# Patient Record
Sex: FEMALE | Race: WHITE | NOT HISPANIC OR LATINO | Employment: UNEMPLOYED | URBAN - METROPOLITAN AREA
[De-identification: names, ages, dates, MRNs, and addresses within clinical notes are randomized per-mention and may not be internally consistent; named-entity substitution may affect disease eponyms.]

---

## 2022-01-25 ENCOUNTER — HOSPITAL ENCOUNTER (INPATIENT)
Facility: HOSPITAL | Age: 57
LOS: 1 days | Discharge: HOME | End: 2022-01-27
Attending: EMERGENCY MEDICINE | Admitting: INTERNAL MEDICINE

## 2022-01-25 DIAGNOSIS — A41.9 SEPSIS, DUE TO UNSPECIFIED ORGANISM, UNSPECIFIED WHETHER ACUTE ORGAN DYSFUNCTION PRESENT: Primary | ICD-10-CM

## 2022-01-25 DIAGNOSIS — R10.9 ABDOMINAL PAIN, UNSPECIFIED ABDOMINAL LOCATION: ICD-10-CM

## 2022-01-25 DIAGNOSIS — R07.9 CHEST PAIN, UNSPECIFIED TYPE: ICD-10-CM

## 2022-01-25 LAB — GLUCOSE BLDC GLUCOMTR-MCNC: 432 MG/DL (ref 70–105)

## 2022-01-25 PROCEDURE — 99285 EMERGENCY DEPT VISIT HI MDM: CPT

## 2022-01-25 PROCEDURE — 82962 GLUCOSE BLOOD TEST: CPT

## 2022-01-25 PROCEDURE — 0202U NFCT DS 22 TRGT SARS-COV-2: CPT | Performed by: EMERGENCY MEDICINE

## 2022-01-26 ENCOUNTER — APPOINTMENT (OUTPATIENT)
Dept: GENERAL RADIOLOGY | Facility: HOSPITAL | Age: 57
End: 2022-01-26

## 2022-01-26 ENCOUNTER — APPOINTMENT (OUTPATIENT)
Dept: CT IMAGING | Facility: HOSPITAL | Age: 57
End: 2022-01-26

## 2022-01-26 PROBLEM — A41.9 SEPSIS: Status: ACTIVE | Noted: 2022-01-26

## 2022-01-26 PROBLEM — I95.9 HYPOTENSION: Status: ACTIVE | Noted: 2022-01-26

## 2022-01-26 PROBLEM — A41.9 SEVERE SEPSIS: Status: ACTIVE | Noted: 2022-01-26

## 2022-01-26 PROBLEM — R65.20 SEVERE SEPSIS: Status: ACTIVE | Noted: 2022-01-26

## 2022-01-26 PROBLEM — E87.6 HYPOKALEMIA: Status: ACTIVE | Noted: 2022-01-26

## 2022-01-26 PROBLEM — A41.9 SEPSIS: Status: RESOLVED | Noted: 2022-01-26 | Resolved: 2022-01-26

## 2022-01-26 PROBLEM — E87.20 LACTIC ACIDOSIS: Status: ACTIVE | Noted: 2022-01-26

## 2022-01-26 PROBLEM — L97.922 NON-PRESSURE ULCER OF LEFT LOWER EXTREMITY WITH FAT LAYER EXPOSED: Status: ACTIVE | Noted: 2022-01-26

## 2022-01-26 LAB
ALBUMIN SERPL-MCNC: 3.8 G/DL (ref 3.5–5.2)
ALBUMIN/GLOB SERPL: 1.5 G/DL
ALP SERPL-CCNC: 202 U/L (ref 39–117)
ALT SERPL W P-5'-P-CCNC: 32 U/L (ref 1–33)
ANION GAP SERPL CALCULATED.3IONS-SCNC: 15 MMOL/L (ref 5–15)
ANION GAP SERPL CALCULATED.3IONS-SCNC: 8 MMOL/L (ref 5–15)
APTT PPP: 23.1 SECONDS (ref 24–31)
AST SERPL-CCNC: 20 U/L (ref 1–32)
B PARAPERT DNA SPEC QL NAA+PROBE: NOT DETECTED
B PERT DNA SPEC QL NAA+PROBE: NOT DETECTED
BASOPHILS # BLD AUTO: 0.2 10*3/MM3 (ref 0–0.2)
BASOPHILS NFR BLD AUTO: 0.7 % (ref 0–1.5)
BILIRUB SERPL-MCNC: 0.6 MG/DL (ref 0–1.2)
BILIRUB UR QL STRIP: NEGATIVE
BUN SERPL-MCNC: 10 MG/DL (ref 6–20)
BUN SERPL-MCNC: 8 MG/DL (ref 6–20)
BUN/CREAT SERPL: 11.8 (ref 7–25)
BUN/CREAT SERPL: 25 (ref 7–25)
C PNEUM DNA NPH QL NAA+NON-PROBE: NOT DETECTED
CALCIUM SPEC-SCNC: 8.1 MG/DL (ref 8.6–10.5)
CALCIUM SPEC-SCNC: 9.6 MG/DL (ref 8.6–10.5)
CHLORIDE SERPL-SCNC: 100 MMOL/L (ref 98–107)
CHLORIDE SERPL-SCNC: 107 MMOL/L (ref 98–107)
CLARITY UR: CLEAR
CO2 SERPL-SCNC: 24 MMOL/L (ref 22–29)
CO2 SERPL-SCNC: 26 MMOL/L (ref 22–29)
COLOR UR: YELLOW
CREAT SERPL-MCNC: 0.4 MG/DL (ref 0.57–1)
CREAT SERPL-MCNC: 0.68 MG/DL (ref 0.57–1)
D-LACTATE SERPL-SCNC: 2.2 MMOL/L (ref 0.5–2)
D-LACTATE SERPL-SCNC: 2.6 MMOL/L (ref 0.5–2)
DEPRECATED RDW RBC AUTO: 40.7 FL (ref 37–54)
EOSINOPHIL # BLD AUTO: 0 10*3/MM3 (ref 0–0.4)
EOSINOPHIL NFR BLD AUTO: 0.1 % (ref 0.3–6.2)
ERYTHROCYTE [DISTWIDTH] IN BLOOD BY AUTOMATED COUNT: 14 % (ref 12.3–15.4)
FLUAV SUBTYP SPEC NAA+PROBE: NOT DETECTED
FLUBV RNA ISLT QL NAA+PROBE: NOT DETECTED
GFR SERPL CREATININE-BSD FRML MDRD: 90 ML/MIN/1.73
GFR SERPL CREATININE-BSD FRML MDRD: >150 ML/MIN/1.73
GLOBULIN UR ELPH-MCNC: 2.6 GM/DL
GLUCOSE BLDC GLUCOMTR-MCNC: 153 MG/DL (ref 70–105)
GLUCOSE BLDC GLUCOMTR-MCNC: 197 MG/DL (ref 70–105)
GLUCOSE BLDC GLUCOMTR-MCNC: 205 MG/DL (ref 70–105)
GLUCOSE BLDC GLUCOMTR-MCNC: 237 MG/DL (ref 70–105)
GLUCOSE BLDC GLUCOMTR-MCNC: 254 MG/DL (ref 70–105)
GLUCOSE SERPL-MCNC: 148 MG/DL (ref 65–99)
GLUCOSE SERPL-MCNC: 319 MG/DL (ref 65–99)
GLUCOSE UR STRIP-MCNC: ABNORMAL MG/DL
HADV DNA SPEC NAA+PROBE: NOT DETECTED
HBA1C MFR BLD: 13.5 % (ref 3.5–5.6)
HCOV 229E RNA SPEC QL NAA+PROBE: NOT DETECTED
HCOV HKU1 RNA SPEC QL NAA+PROBE: NOT DETECTED
HCOV NL63 RNA SPEC QL NAA+PROBE: NOT DETECTED
HCOV OC43 RNA SPEC QL NAA+PROBE: NOT DETECTED
HCT VFR BLD AUTO: 46.7 % (ref 34–46.6)
HGB BLD-MCNC: 15.4 G/DL (ref 12–15.9)
HGB UR QL STRIP.AUTO: NEGATIVE
HIV1+2 AB SER QL: NORMAL
HMPV RNA NPH QL NAA+NON-PROBE: NOT DETECTED
HOLD SPECIMEN: NORMAL
HPIV1 RNA ISLT QL NAA+PROBE: NOT DETECTED
HPIV2 RNA SPEC QL NAA+PROBE: NOT DETECTED
HPIV3 RNA NPH QL NAA+PROBE: NOT DETECTED
HPIV4 P GENE NPH QL NAA+PROBE: NOT DETECTED
INR PPP: 1.03 (ref 0.93–1.1)
KETONES UR QL STRIP: NEGATIVE
LEUKOCYTE ESTERASE UR QL STRIP.AUTO: NEGATIVE
LIPASE SERPL-CCNC: 17 U/L (ref 13–60)
LYMPHOCYTES # BLD AUTO: 0.9 10*3/MM3 (ref 0.7–3.1)
LYMPHOCYTES NFR BLD AUTO: 3.8 % (ref 19.6–45.3)
M PNEUMO IGG SER IA-ACNC: NOT DETECTED
MAGNESIUM SERPL-MCNC: 1.3 MG/DL (ref 1.6–2.6)
MCH RBC QN AUTO: 27.4 PG (ref 26.6–33)
MCHC RBC AUTO-ENTMCNC: 33 G/DL (ref 31.5–35.7)
MCV RBC AUTO: 82.9 FL (ref 79–97)
MONOCYTES # BLD AUTO: 0.6 10*3/MM3 (ref 0.1–0.9)
MONOCYTES NFR BLD AUTO: 2.7 % (ref 5–12)
NEUTROPHILS NFR BLD AUTO: 20.7 10*3/MM3 (ref 1.7–7)
NEUTROPHILS NFR BLD AUTO: 92.7 % (ref 42.7–76)
NITRITE UR QL STRIP: NEGATIVE
NRBC BLD AUTO-RTO: 0 /100 WBC (ref 0–0.2)
PH UR STRIP.AUTO: 5.5 [PH] (ref 5–8)
PLATELET # BLD AUTO: 176 10*3/MM3 (ref 140–450)
PMV BLD AUTO: 9.9 FL (ref 6–12)
POTASSIUM SERPL-SCNC: 3.2 MMOL/L (ref 3.5–5.2)
POTASSIUM SERPL-SCNC: 4.1 MMOL/L (ref 3.5–5.2)
PROT SERPL-MCNC: 6.4 G/DL (ref 6–8.5)
PROT UR QL STRIP: NEGATIVE
PROTHROMBIN TIME: 11.4 SECONDS (ref 9.6–11.7)
RBC # BLD AUTO: 5.63 10*6/MM3 (ref 3.77–5.28)
RHINOVIRUS RNA SPEC NAA+PROBE: NOT DETECTED
RSV RNA NPH QL NAA+NON-PROBE: NOT DETECTED
SARS-COV-2 RNA NPH QL NAA+NON-PROBE: NOT DETECTED
SARS-COV-2 RNA PNL SPEC NAA+PROBE: NOT DETECTED
SODIUM SERPL-SCNC: 139 MMOL/L (ref 136–145)
SODIUM SERPL-SCNC: 141 MMOL/L (ref 136–145)
SP GR UR STRIP: 1.04 (ref 1–1.03)
TROPONIN T SERPL-MCNC: <0.01 NG/ML (ref 0–0.03)
UROBILINOGEN UR QL STRIP: ABNORMAL
VANCOMYCIN PEAK SERPL-MCNC: 23.8 MCG/ML (ref 20–40)
WBC NRBC COR # BLD: 22.4 10*3/MM3 (ref 3.4–10.8)
WHOLE BLOOD HOLD SPECIMEN: NORMAL
WHOLE BLOOD HOLD SPECIMEN: NORMAL

## 2022-01-26 PROCEDURE — 85730 THROMBOPLASTIN TIME PARTIAL: CPT | Performed by: EMERGENCY MEDICINE

## 2022-01-26 PROCEDURE — 93005 ELECTROCARDIOGRAM TRACING: CPT | Performed by: EMERGENCY MEDICINE

## 2022-01-26 PROCEDURE — 25010000002 VANCOMYCIN PER 500 MG: Performed by: EMERGENCY MEDICINE

## 2022-01-26 PROCEDURE — 25010000002 VANCOMYCIN 10 G RECONSTITUTED SOLUTION: Performed by: INTERNAL MEDICINE

## 2022-01-26 PROCEDURE — 83605 ASSAY OF LACTIC ACID: CPT

## 2022-01-26 PROCEDURE — 63710000001 INSULIN LISPRO (HUMAN) PER 5 UNITS: Performed by: INTERNAL MEDICINE

## 2022-01-26 PROCEDURE — 25010000002 MAGNESIUM SULFATE 2 GM/50ML SOLUTION: Performed by: INTERNAL MEDICINE

## 2022-01-26 PROCEDURE — 87040 BLOOD CULTURE FOR BACTERIA: CPT | Performed by: EMERGENCY MEDICINE

## 2022-01-26 PROCEDURE — 25010000002 CEFEPIME PER 500 MG: Performed by: INTERNAL MEDICINE

## 2022-01-26 PROCEDURE — G0432 EIA HIV-1/HIV-2 SCREEN: HCPCS | Performed by: INTERNAL MEDICINE

## 2022-01-26 PROCEDURE — 83690 ASSAY OF LIPASE: CPT | Performed by: EMERGENCY MEDICINE

## 2022-01-26 PROCEDURE — 74177 CT ABD & PELVIS W/CONTRAST: CPT

## 2022-01-26 PROCEDURE — 84484 ASSAY OF TROPONIN QUANT: CPT | Performed by: EMERGENCY MEDICINE

## 2022-01-26 PROCEDURE — 87150 DNA/RNA AMPLIFIED PROBE: CPT | Performed by: EMERGENCY MEDICINE

## 2022-01-26 PROCEDURE — 80053 COMPREHEN METABOLIC PANEL: CPT | Performed by: INTERNAL MEDICINE

## 2022-01-26 PROCEDURE — 85610 PROTHROMBIN TIME: CPT | Performed by: EMERGENCY MEDICINE

## 2022-01-26 PROCEDURE — 83735 ASSAY OF MAGNESIUM: CPT | Performed by: INTERNAL MEDICINE

## 2022-01-26 PROCEDURE — 25010000002 VANCOMYCIN 10 G RECONSTITUTED SOLUTION: Performed by: EMERGENCY MEDICINE

## 2022-01-26 PROCEDURE — 63710000001 INSULIN GLARGINE PER 5 UNITS: Performed by: INTERNAL MEDICINE

## 2022-01-26 PROCEDURE — 71045 X-RAY EXAM CHEST 1 VIEW: CPT

## 2022-01-26 PROCEDURE — 82962 GLUCOSE BLOOD TEST: CPT

## 2022-01-26 PROCEDURE — 80202 ASSAY OF VANCOMYCIN: CPT | Performed by: INTERNAL MEDICINE

## 2022-01-26 PROCEDURE — 99231 SBSQ HOSP IP/OBS SF/LOW 25: CPT | Performed by: NURSE PRACTITIONER

## 2022-01-26 PROCEDURE — 36415 COLL VENOUS BLD VENIPUNCTURE: CPT | Performed by: INTERNAL MEDICINE

## 2022-01-26 PROCEDURE — 99222 1ST HOSP IP/OBS MODERATE 55: CPT | Performed by: PSYCHIATRY & NEUROLOGY

## 2022-01-26 PROCEDURE — 99223 1ST HOSP IP/OBS HIGH 75: CPT | Performed by: INTERNAL MEDICINE

## 2022-01-26 PROCEDURE — 0 IOPAMIDOL PER 1 ML: Performed by: EMERGENCY MEDICINE

## 2022-01-26 PROCEDURE — 25010000002 CEFEPIME PER 500 MG: Performed by: EMERGENCY MEDICINE

## 2022-01-26 PROCEDURE — 85025 COMPLETE CBC W/AUTO DIFF WBC: CPT | Performed by: EMERGENCY MEDICINE

## 2022-01-26 PROCEDURE — 81003 URINALYSIS AUTO W/O SCOPE: CPT | Performed by: EMERGENCY MEDICINE

## 2022-01-26 PROCEDURE — 25010000002 ONDANSETRON PER 1 MG: Performed by: EMERGENCY MEDICINE

## 2022-01-26 PROCEDURE — 83036 HEMOGLOBIN GLYCOSYLATED A1C: CPT | Performed by: INTERNAL MEDICINE

## 2022-01-26 RX ORDER — ARIPIPRAZOLE 2 MG/1
2 TABLET ORAL NIGHTLY
Status: DISCONTINUED | OUTPATIENT
Start: 2022-01-26 | End: 2022-01-27 | Stop reason: HOSPADM

## 2022-01-26 RX ORDER — INSULIN LISPRO 100 [IU]/ML
0-14 INJECTION, SOLUTION INTRAVENOUS; SUBCUTANEOUS
Status: DISCONTINUED | OUTPATIENT
Start: 2022-01-26 | End: 2022-01-27 | Stop reason: HOSPADM

## 2022-01-26 RX ORDER — CLOPIDOGREL BISULFATE 75 MG/1
75 TABLET ORAL DAILY
COMMUNITY

## 2022-01-26 RX ORDER — ACETAMINOPHEN 325 MG/1
650 TABLET ORAL EVERY 4 HOURS PRN
Status: DISCONTINUED | OUTPATIENT
Start: 2022-01-26 | End: 2022-01-27 | Stop reason: HOSPADM

## 2022-01-26 RX ORDER — ONDANSETRON 2 MG/ML
4 INJECTION INTRAMUSCULAR; INTRAVENOUS ONCE
Status: COMPLETED | OUTPATIENT
Start: 2022-01-26 | End: 2022-01-26

## 2022-01-26 RX ORDER — POTASSIUM CHLORIDE 1.5 G/1.77G
40 POWDER, FOR SOLUTION ORAL AS NEEDED
Status: DISCONTINUED | OUTPATIENT
Start: 2022-01-26 | End: 2022-01-27 | Stop reason: HOSPADM

## 2022-01-26 RX ORDER — ASPIRIN 81 MG/1
324 TABLET, CHEWABLE ORAL ONCE
Status: COMPLETED | OUTPATIENT
Start: 2022-01-26 | End: 2022-01-26

## 2022-01-26 RX ORDER — MIDODRINE HYDROCHLORIDE 5 MG/1
5 TABLET ORAL
Status: DISCONTINUED | OUTPATIENT
Start: 2022-01-26 | End: 2022-01-26

## 2022-01-26 RX ORDER — PAROXETINE HYDROCHLORIDE 40 MG/1
40 TABLET, FILM COATED ORAL DAILY
Status: DISCONTINUED | OUTPATIENT
Start: 2022-01-26 | End: 2022-01-27 | Stop reason: HOSPADM

## 2022-01-26 RX ORDER — MIDODRINE HYDROCHLORIDE 5 MG/1
5 TABLET ORAL ONCE
Status: COMPLETED | OUTPATIENT
Start: 2022-01-26 | End: 2022-01-26

## 2022-01-26 RX ORDER — ATORVASTATIN CALCIUM 40 MG/1
80 TABLET, FILM COATED ORAL NIGHTLY
Status: DISCONTINUED | OUTPATIENT
Start: 2022-01-26 | End: 2022-01-27 | Stop reason: HOSPADM

## 2022-01-26 RX ORDER — MAGNESIUM SULFATE HEPTAHYDRATE 40 MG/ML
2 INJECTION, SOLUTION INTRAVENOUS AS NEEDED
Status: DISCONTINUED | OUTPATIENT
Start: 2022-01-26 | End: 2022-01-27 | Stop reason: HOSPADM

## 2022-01-26 RX ORDER — POTASSIUM CHLORIDE 20 MEQ/1
40 TABLET, EXTENDED RELEASE ORAL ONCE
Status: COMPLETED | OUTPATIENT
Start: 2022-01-26 | End: 2022-01-26

## 2022-01-26 RX ORDER — NICOTINE POLACRILEX 4 MG
15 LOZENGE BUCCAL
Status: DISCONTINUED | OUTPATIENT
Start: 2022-01-26 | End: 2022-01-27 | Stop reason: HOSPADM

## 2022-01-26 RX ORDER — CLOPIDOGREL BISULFATE 75 MG/1
75 TABLET ORAL DAILY
Status: DISCONTINUED | OUTPATIENT
Start: 2022-01-26 | End: 2022-01-27 | Stop reason: HOSPADM

## 2022-01-26 RX ORDER — VANCOMYCIN 1.75 GRAM/500 ML IN 0.9 % SODIUM CHLORIDE INTRAVENOUS
20 ONCE
Status: COMPLETED | OUTPATIENT
Start: 2022-01-26 | End: 2022-01-26

## 2022-01-26 RX ORDER — MAGNESIUM SULFATE HEPTAHYDRATE 40 MG/ML
4 INJECTION, SOLUTION INTRAVENOUS AS NEEDED
Status: DISCONTINUED | OUTPATIENT
Start: 2022-01-26 | End: 2022-01-27 | Stop reason: HOSPADM

## 2022-01-26 RX ORDER — VANCOMYCIN 1.75 GRAM/500 ML IN 0.9 % SODIUM CHLORIDE INTRAVENOUS
20 ONCE
Status: DISCONTINUED | OUTPATIENT
Start: 2022-01-26 | End: 2022-01-26

## 2022-01-26 RX ORDER — POTASSIUM CHLORIDE 20 MEQ/1
40 TABLET, EXTENDED RELEASE ORAL AS NEEDED
Status: DISCONTINUED | OUTPATIENT
Start: 2022-01-26 | End: 2022-01-27 | Stop reason: HOSPADM

## 2022-01-26 RX ORDER — SODIUM CHLORIDE 0.9 % (FLUSH) 0.9 %
10 SYRINGE (ML) INJECTION EVERY 12 HOURS SCHEDULED
Status: DISCONTINUED | OUTPATIENT
Start: 2022-01-26 | End: 2022-01-27 | Stop reason: HOSPADM

## 2022-01-26 RX ORDER — SODIUM CHLORIDE 0.9 % (FLUSH) 0.9 %
10 SYRINGE (ML) INJECTION AS NEEDED
Status: DISCONTINUED | OUTPATIENT
Start: 2022-01-26 | End: 2022-01-27 | Stop reason: HOSPADM

## 2022-01-26 RX ORDER — PAROXETINE HYDROCHLORIDE 40 MG/1
40 TABLET, FILM COATED ORAL DAILY
COMMUNITY

## 2022-01-26 RX ORDER — FAMOTIDINE 10 MG/ML
20 INJECTION, SOLUTION INTRAVENOUS ONCE
Status: COMPLETED | OUTPATIENT
Start: 2022-01-26 | End: 2022-01-26

## 2022-01-26 RX ORDER — ACETAMINOPHEN 500 MG
1000 TABLET ORAL ONCE
Status: COMPLETED | OUTPATIENT
Start: 2022-01-26 | End: 2022-01-26

## 2022-01-26 RX ORDER — ATORVASTATIN CALCIUM 80 MG/1
80 TABLET, FILM COATED ORAL DAILY
COMMUNITY

## 2022-01-26 RX ORDER — ACETAMINOPHEN 500 MG
1000 TABLET ORAL EVERY 6 HOURS PRN
COMMUNITY

## 2022-01-26 RX ORDER — DEXTROSE MONOHYDRATE 25 G/50ML
25 INJECTION, SOLUTION INTRAVENOUS
Status: DISCONTINUED | OUTPATIENT
Start: 2022-01-26 | End: 2022-01-27 | Stop reason: HOSPADM

## 2022-01-26 RX ORDER — INSULIN LISPRO 100 [IU]/ML
0-14 INJECTION, SOLUTION INTRAVENOUS; SUBCUTANEOUS AS NEEDED
Status: DISCONTINUED | OUTPATIENT
Start: 2022-01-26 | End: 2022-01-27 | Stop reason: HOSPADM

## 2022-01-26 RX ORDER — OLANZAPINE 10 MG/2ML
1 INJECTION, POWDER, LYOPHILIZED, FOR SOLUTION INTRAMUSCULAR
Status: DISCONTINUED | OUTPATIENT
Start: 2022-01-26 | End: 2022-01-27 | Stop reason: HOSPADM

## 2022-01-26 RX ORDER — POTASSIUM CHLORIDE 7.45 MG/ML
10 INJECTION INTRAVENOUS
Status: DISCONTINUED | OUTPATIENT
Start: 2022-01-26 | End: 2022-01-27 | Stop reason: HOSPADM

## 2022-01-26 RX ORDER — MIDODRINE HYDROCHLORIDE 5 MG/1
10 TABLET ORAL
Status: DISCONTINUED | OUTPATIENT
Start: 2022-01-26 | End: 2022-01-27

## 2022-01-26 RX ORDER — SODIUM CHLORIDE, SODIUM LACTATE, POTASSIUM CHLORIDE, CALCIUM CHLORIDE 600; 310; 30; 20 MG/100ML; MG/100ML; MG/100ML; MG/100ML
100 INJECTION, SOLUTION INTRAVENOUS CONTINUOUS
Status: DISCONTINUED | OUTPATIENT
Start: 2022-01-26 | End: 2022-01-27 | Stop reason: HOSPADM

## 2022-01-26 RX ORDER — ONDANSETRON 2 MG/ML
4 INJECTION INTRAMUSCULAR; INTRAVENOUS EVERY 6 HOURS PRN
Status: DISCONTINUED | OUTPATIENT
Start: 2022-01-26 | End: 2022-01-27 | Stop reason: HOSPADM

## 2022-01-26 RX ORDER — INSULIN LISPRO 100 [IU]/ML
15 INJECTION, SOLUTION INTRAVENOUS; SUBCUTANEOUS
Status: DISCONTINUED | OUTPATIENT
Start: 2022-01-26 | End: 2022-01-27 | Stop reason: HOSPADM

## 2022-01-26 RX ORDER — INSULIN GLARGINE 100 [IU]/ML
20 INJECTION, SOLUTION SUBCUTANEOUS DAILY
COMMUNITY
End: 2023-03-02

## 2022-01-26 RX ADMIN — ASPIRIN 324 MG: 81 TABLET, CHEWABLE ORAL at 00:34

## 2022-01-26 RX ADMIN — POTASSIUM CHLORIDE 40 MEQ: 1500 TABLET, EXTENDED RELEASE ORAL at 03:35

## 2022-01-26 RX ADMIN — CLOPIDOGREL BISULFATE 75 MG: 75 TABLET, FILM COATED ORAL at 17:18

## 2022-01-26 RX ADMIN — VANCOMYCIN HYDROCHLORIDE 1250 MG: 10 INJECTION, POWDER, LYOPHILIZED, FOR SOLUTION INTRAVENOUS at 19:48

## 2022-01-26 RX ADMIN — MAGNESIUM SULFATE HEPTAHYDRATE 2 G: 2 INJECTION, SOLUTION INTRAVENOUS at 13:12

## 2022-01-26 RX ADMIN — SODIUM CHLORIDE 1000 ML: 9 INJECTION, SOLUTION INTRAVENOUS at 00:34

## 2022-01-26 RX ADMIN — MIDODRINE HYDROCHLORIDE 5 MG: 5 TABLET ORAL at 09:57

## 2022-01-26 RX ADMIN — MIDODRINE HYDROCHLORIDE 10 MG: 5 TABLET ORAL at 17:18

## 2022-01-26 RX ADMIN — PAROXETINE 40 MG: 40 TABLET, FILM COATED ORAL at 17:18

## 2022-01-26 RX ADMIN — IOPAMIDOL 100 ML: 755 INJECTION, SOLUTION INTRAVENOUS at 01:59

## 2022-01-26 RX ADMIN — MAGNESIUM SULFATE HEPTAHYDRATE 2 G: 2 INJECTION, SOLUTION INTRAVENOUS at 07:58

## 2022-01-26 RX ADMIN — ONDANSETRON 4 MG: 2 INJECTION INTRAMUSCULAR; INTRAVENOUS at 03:35

## 2022-01-26 RX ADMIN — INSULIN LISPRO 8 UNITS: 100 INJECTION, SOLUTION INTRAVENOUS; SUBCUTANEOUS at 10:01

## 2022-01-26 RX ADMIN — SODIUM CHLORIDE, POTASSIUM CHLORIDE, SODIUM LACTATE AND CALCIUM CHLORIDE 100 ML/HR: 600; 310; 30; 20 INJECTION, SOLUTION INTRAVENOUS at 07:52

## 2022-01-26 RX ADMIN — Medication 10 ML: at 09:12

## 2022-01-26 RX ADMIN — Medication 10 ML: at 20:24

## 2022-01-26 RX ADMIN — ONDANSETRON 4 MG: 2 INJECTION INTRAMUSCULAR; INTRAVENOUS at 00:47

## 2022-01-26 RX ADMIN — INSULIN GLARGINE 20 UNITS: 100 INJECTION, SOLUTION SUBCUTANEOUS at 20:23

## 2022-01-26 RX ADMIN — CEFEPIME HYDROCHLORIDE 2 G: 2 INJECTION, POWDER, FOR SOLUTION INTRAMUSCULAR; INTRAVENOUS at 21:17

## 2022-01-26 RX ADMIN — CEFEPIME HYDROCHLORIDE 2 G: 2 INJECTION, POWDER, FOR SOLUTION INTRAVENOUS at 03:35

## 2022-01-26 RX ADMIN — MAGNESIUM SULFATE HEPTAHYDRATE 2 G: 2 INJECTION, SOLUTION INTRAVENOUS at 10:05

## 2022-01-26 RX ADMIN — FAMOTIDINE 20 MG: 10 INJECTION INTRAVENOUS at 00:47

## 2022-01-26 RX ADMIN — MIDODRINE HYDROCHLORIDE 10 MG: 5 TABLET ORAL at 13:12

## 2022-01-26 RX ADMIN — SODIUM CHLORIDE 1000 ML: 9 INJECTION, SOLUTION INTRAVENOUS at 03:37

## 2022-01-26 RX ADMIN — MIDODRINE HYDROCHLORIDE 5 MG: 5 TABLET ORAL at 08:03

## 2022-01-26 RX ADMIN — APIXABAN 5 MG: 5 TABLET, FILM COATED ORAL at 20:25

## 2022-01-26 RX ADMIN — INSULIN LISPRO 15 UNITS: 100 INJECTION, SOLUTION INTRAVENOUS; SUBCUTANEOUS at 17:18

## 2022-01-26 RX ADMIN — VANCOMYCIN HYDROCHLORIDE 1750 MG: 10 INJECTION, POWDER, LYOPHILIZED, FOR SOLUTION INTRAVENOUS at 05:56

## 2022-01-26 RX ADMIN — SODIUM CHLORIDE 1000 ML: 9 INJECTION, SOLUTION INTRAVENOUS at 06:05

## 2022-01-26 RX ADMIN — ATORVASTATIN CALCIUM 80 MG: 40 TABLET, FILM COATED ORAL at 20:25

## 2022-01-26 RX ADMIN — ACETAMINOPHEN 1000 MG: 500 TABLET, FILM COATED ORAL at 00:34

## 2022-01-26 RX ADMIN — CEFEPIME HYDROCHLORIDE 2 G: 2 INJECTION, POWDER, FOR SOLUTION INTRAMUSCULAR; INTRAVENOUS at 13:11

## 2022-01-26 RX ADMIN — INSULIN LISPRO 5 UNITS: 100 INJECTION, SOLUTION INTRAVENOUS; SUBCUTANEOUS at 13:13

## 2022-01-26 RX ADMIN — ARIPIPRAZOLE 2 MG: 2 TABLET ORAL at 20:25

## 2022-01-26 RX ADMIN — MAGNESIUM SULFATE HEPTAHYDRATE 2 G: 2 INJECTION, SOLUTION INTRAVENOUS at 22:10

## 2022-01-27 VITALS
RESPIRATION RATE: 19 BRPM | SYSTOLIC BLOOD PRESSURE: 146 MMHG | TEMPERATURE: 98.6 F | DIASTOLIC BLOOD PRESSURE: 74 MMHG | BODY MASS INDEX: 31.08 KG/M2 | HEIGHT: 67 IN | OXYGEN SATURATION: 95 % | WEIGHT: 198 LBS | HEART RATE: 63 BPM

## 2022-01-27 PROBLEM — R10.9 ABDOMINAL PAIN: Status: RESOLVED | Noted: 2022-01-27 | Resolved: 2022-01-27

## 2022-01-27 PROBLEM — E87.6 HYPOKALEMIA: Status: RESOLVED | Noted: 2022-01-26 | Resolved: 2022-01-27

## 2022-01-27 PROBLEM — A41.9 SEVERE SEPSIS: Status: RESOLVED | Noted: 2022-01-26 | Resolved: 2022-01-27

## 2022-01-27 PROBLEM — R10.9 ABDOMINAL PAIN: Status: ACTIVE | Noted: 2022-01-27

## 2022-01-27 PROBLEM — I95.9 HYPOTENSION: Status: RESOLVED | Noted: 2022-01-26 | Resolved: 2022-01-27

## 2022-01-27 PROBLEM — E87.20 LACTIC ACIDOSIS: Status: RESOLVED | Noted: 2022-01-26 | Resolved: 2022-01-27

## 2022-01-27 PROBLEM — K52.9 GASTROENTERITIS PRESUMED INFECTIOUS: Status: ACTIVE | Noted: 2022-01-27

## 2022-01-27 PROBLEM — R65.20 SEVERE SEPSIS: Status: RESOLVED | Noted: 2022-01-26 | Resolved: 2022-01-27

## 2022-01-27 LAB
ALBUMIN SERPL-MCNC: 2.7 G/DL (ref 3.5–5.2)
ALBUMIN/GLOB SERPL: 1.3 G/DL
ALP SERPL-CCNC: 154 U/L (ref 39–117)
ALT SERPL W P-5'-P-CCNC: 33 U/L (ref 1–33)
ANION GAP SERPL CALCULATED.3IONS-SCNC: 5 MMOL/L (ref 5–15)
AST SERPL-CCNC: 36 U/L (ref 1–32)
BASOPHILS # BLD AUTO: 0.1 10*3/MM3 (ref 0–0.2)
BASOPHILS NFR BLD AUTO: 0.6 % (ref 0–1.5)
BILIRUB SERPL-MCNC: 0.3 MG/DL (ref 0–1.2)
BUN SERPL-MCNC: 9 MG/DL (ref 6–20)
BUN/CREAT SERPL: 22.5 (ref 7–25)
CALCIUM SPEC-SCNC: 7.8 MG/DL (ref 8.6–10.5)
CHLORIDE SERPL-SCNC: 107 MMOL/L (ref 98–107)
CO2 SERPL-SCNC: 25 MMOL/L (ref 22–29)
CREAT SERPL-MCNC: 0.4 MG/DL (ref 0.57–1)
D-LACTATE SERPL-SCNC: 0.9 MMOL/L (ref 0.5–2)
DEPRECATED RDW RBC AUTO: 41.1 FL (ref 37–54)
EOSINOPHIL # BLD AUTO: 0.1 10*3/MM3 (ref 0–0.4)
EOSINOPHIL NFR BLD AUTO: 1.1 % (ref 0.3–6.2)
ERYTHROCYTE [DISTWIDTH] IN BLOOD BY AUTOMATED COUNT: 14.1 % (ref 12.3–15.4)
GFR SERPL CREATININE-BSD FRML MDRD: >150 ML/MIN/1.73
GLOBULIN UR ELPH-MCNC: 2.1 GM/DL
GLUCOSE BLDC GLUCOMTR-MCNC: 152 MG/DL (ref 70–105)
GLUCOSE BLDC GLUCOMTR-MCNC: 229 MG/DL (ref 70–105)
GLUCOSE SERPL-MCNC: 168 MG/DL (ref 65–99)
HCT VFR BLD AUTO: 37.8 % (ref 34–46.6)
HGB BLD-MCNC: 12.4 G/DL (ref 12–15.9)
LYMPHOCYTES # BLD AUTO: 1.6 10*3/MM3 (ref 0.7–3.1)
LYMPHOCYTES NFR BLD AUTO: 16.2 % (ref 19.6–45.3)
MAGNESIUM SERPL-MCNC: 2.8 MG/DL (ref 1.6–2.6)
MCH RBC QN AUTO: 27.5 PG (ref 26.6–33)
MCHC RBC AUTO-ENTMCNC: 32.8 G/DL (ref 31.5–35.7)
MCV RBC AUTO: 83.7 FL (ref 79–97)
MONOCYTES # BLD AUTO: 0.7 10*3/MM3 (ref 0.1–0.9)
MONOCYTES NFR BLD AUTO: 7.4 % (ref 5–12)
NEUTROPHILS NFR BLD AUTO: 7.3 10*3/MM3 (ref 1.7–7)
NEUTROPHILS NFR BLD AUTO: 74.7 % (ref 42.7–76)
NRBC BLD AUTO-RTO: 0 /100 WBC (ref 0–0.2)
PLATELET # BLD AUTO: 128 10*3/MM3 (ref 140–450)
PMV BLD AUTO: 9.9 FL (ref 6–12)
POTASSIUM SERPL-SCNC: 4.1 MMOL/L (ref 3.5–5.2)
PROT SERPL-MCNC: 4.8 G/DL (ref 6–8.5)
QT INTERVAL: 326 MS
RBC # BLD AUTO: 4.51 10*6/MM3 (ref 3.77–5.28)
SODIUM SERPL-SCNC: 137 MMOL/L (ref 136–145)
VANCOMYCIN TROUGH SERPL-MCNC: 10.6 MCG/ML (ref 5–20)
WBC NRBC COR # BLD: 9.8 10*3/MM3 (ref 3.4–10.8)

## 2022-01-27 PROCEDURE — 63710000001 INSULIN LISPRO (HUMAN) PER 5 UNITS: Performed by: INTERNAL MEDICINE

## 2022-01-27 PROCEDURE — 25010000002 VANCOMYCIN 10 G RECONSTITUTED SOLUTION: Performed by: INTERNAL MEDICINE

## 2022-01-27 PROCEDURE — 80053 COMPREHEN METABOLIC PANEL: CPT | Performed by: INTERNAL MEDICINE

## 2022-01-27 PROCEDURE — 25010000002 MAGNESIUM SULFATE 2 GM/50ML SOLUTION: Performed by: INTERNAL MEDICINE

## 2022-01-27 PROCEDURE — 83735 ASSAY OF MAGNESIUM: CPT | Performed by: INTERNAL MEDICINE

## 2022-01-27 PROCEDURE — 83605 ASSAY OF LACTIC ACID: CPT | Performed by: INTERNAL MEDICINE

## 2022-01-27 PROCEDURE — 85025 COMPLETE CBC W/AUTO DIFF WBC: CPT | Performed by: INTERNAL MEDICINE

## 2022-01-27 PROCEDURE — 25010000002 CEFEPIME PER 500 MG: Performed by: INTERNAL MEDICINE

## 2022-01-27 PROCEDURE — 36415 COLL VENOUS BLD VENIPUNCTURE: CPT | Performed by: INTERNAL MEDICINE

## 2022-01-27 PROCEDURE — 97165 OT EVAL LOW COMPLEX 30 MIN: CPT

## 2022-01-27 PROCEDURE — 82962 GLUCOSE BLOOD TEST: CPT

## 2022-01-27 PROCEDURE — 99239 HOSP IP/OBS DSCHRG MGMT >30: CPT | Performed by: INTERNAL MEDICINE

## 2022-01-27 PROCEDURE — 80202 ASSAY OF VANCOMYCIN: CPT | Performed by: INTERNAL MEDICINE

## 2022-01-27 PROCEDURE — 99231 SBSQ HOSP IP/OBS SF/LOW 25: CPT | Performed by: PSYCHIATRY & NEUROLOGY

## 2022-01-27 PROCEDURE — 97161 PT EVAL LOW COMPLEX 20 MIN: CPT

## 2022-01-27 RX ORDER — ARIPIPRAZOLE 2 MG/1
2 TABLET ORAL NIGHTLY
Qty: 30 TABLET | Refills: 0 | Status: SHIPPED | OUTPATIENT
Start: 2022-01-27 | End: 2023-03-02

## 2022-01-27 RX ORDER — ONDANSETRON 4 MG/1
4 TABLET, ORALLY DISINTEGRATING ORAL EVERY 8 HOURS PRN
Qty: 30 TABLET | Refills: 0 | Status: SHIPPED | OUTPATIENT
Start: 2022-01-27 | End: 2023-03-02

## 2022-01-27 RX ADMIN — INSULIN LISPRO 15 UNITS: 100 INJECTION, SOLUTION INTRAVENOUS; SUBCUTANEOUS at 10:34

## 2022-01-27 RX ADMIN — MUPIROCIN 1 APPLICATION: 20 OINTMENT TOPICAL at 10:35

## 2022-01-27 RX ADMIN — PAROXETINE 40 MG: 40 TABLET, FILM COATED ORAL at 10:34

## 2022-01-27 RX ADMIN — MAGNESIUM SULFATE HEPTAHYDRATE 2 G: 2 INJECTION, SOLUTION INTRAVENOUS at 00:42

## 2022-01-27 RX ADMIN — CLOPIDOGREL BISULFATE 75 MG: 75 TABLET, FILM COATED ORAL at 10:34

## 2022-01-27 RX ADMIN — INSULIN LISPRO 5 UNITS: 100 INJECTION, SOLUTION INTRAVENOUS; SUBCUTANEOUS at 14:12

## 2022-01-27 RX ADMIN — MAGNESIUM SULFATE HEPTAHYDRATE 2 G: 2 INJECTION, SOLUTION INTRAVENOUS at 03:51

## 2022-01-27 RX ADMIN — INSULIN LISPRO 15 UNITS: 100 INJECTION, SOLUTION INTRAVENOUS; SUBCUTANEOUS at 14:12

## 2022-01-27 RX ADMIN — VANCOMYCIN HYDROCHLORIDE 1250 MG: 10 INJECTION, POWDER, LYOPHILIZED, FOR SOLUTION INTRAVENOUS at 05:59

## 2022-01-27 RX ADMIN — MIDODRINE HYDROCHLORIDE 10 MG: 5 TABLET ORAL at 10:34

## 2022-01-27 RX ADMIN — APIXABAN 5 MG: 5 TABLET, FILM COATED ORAL at 10:34

## 2022-01-27 RX ADMIN — CEFEPIME HYDROCHLORIDE 2 G: 2 INJECTION, POWDER, FOR SOLUTION INTRAMUSCULAR; INTRAVENOUS at 02:53

## 2022-01-28 LAB
BACTERIA BLD CULT: NORMAL
BOTTLE TYPE: NORMAL

## 2022-01-31 LAB
BACTERIA SPEC AEROBE CULT: ABNORMAL
BACTERIA SPEC AEROBE CULT: NORMAL
GRAM STN SPEC: ABNORMAL

## 2023-03-02 ENCOUNTER — HOSPITAL ENCOUNTER (OUTPATIENT)
Dept: GENERAL RADIOLOGY | Facility: HOSPITAL | Age: 58
Discharge: HOME | End: 2023-03-02
Payer: MEDICAID

## 2023-03-02 ENCOUNTER — PATIENT ROUNDING (BHMG ONLY) (OUTPATIENT)
Dept: FAMILY MEDICINE CLINIC | Facility: CLINIC | Age: 58
End: 2023-03-02
Payer: MEDICAID

## 2023-03-02 ENCOUNTER — OFFICE VISIT (OUTPATIENT)
Dept: FAMILY MEDICINE CLINIC | Facility: CLINIC | Age: 58
End: 2023-03-02
Payer: MEDICAID

## 2023-03-02 VITALS
SYSTOLIC BLOOD PRESSURE: 90 MMHG | DIASTOLIC BLOOD PRESSURE: 63 MMHG | HEART RATE: 88 BPM | TEMPERATURE: 97.4 F | HEIGHT: 67 IN | WEIGHT: 167 LBS | OXYGEN SATURATION: 94 % | BODY MASS INDEX: 26.21 KG/M2 | RESPIRATION RATE: 16 BRPM

## 2023-03-02 DIAGNOSIS — Z28.21 INFLUENZA VACCINATION DECLINED: ICD-10-CM

## 2023-03-02 DIAGNOSIS — E78.5 DYSLIPIDEMIA: ICD-10-CM

## 2023-03-02 DIAGNOSIS — E66.3 OVERWEIGHT (BMI 25.0-29.9): ICD-10-CM

## 2023-03-02 DIAGNOSIS — Z00.00 WELLNESS EXAMINATION: Primary | ICD-10-CM

## 2023-03-02 DIAGNOSIS — I25.10 CORONARY ARTERY DISEASE INVOLVING NATIVE CORONARY ARTERY OF NATIVE HEART WITHOUT ANGINA PECTORIS: ICD-10-CM

## 2023-03-02 DIAGNOSIS — I48.92 ATRIAL FLUTTER, PAROXYSMAL: ICD-10-CM

## 2023-03-02 DIAGNOSIS — G89.29 CHRONIC LEFT SHOULDER PAIN: ICD-10-CM

## 2023-03-02 DIAGNOSIS — E11.40 TYPE 2 DIABETES MELLITUS WITH DIABETIC NEUROPATHY, WITHOUT LONG-TERM CURRENT USE OF INSULIN: ICD-10-CM

## 2023-03-02 DIAGNOSIS — M25.512 CHRONIC LEFT SHOULDER PAIN: ICD-10-CM

## 2023-03-02 DIAGNOSIS — Z11.59 NEED FOR HEPATITIS C SCREENING TEST: ICD-10-CM

## 2023-03-02 DIAGNOSIS — R15.9 INCONTINENCE OF FECES, UNSPECIFIED FECAL INCONTINENCE TYPE: ICD-10-CM

## 2023-03-02 DIAGNOSIS — Z72.0 TOBACCO USER: ICD-10-CM

## 2023-03-02 DIAGNOSIS — Z23 NEED FOR TDAP VACCINATION: ICD-10-CM

## 2023-03-02 DIAGNOSIS — R05.3 CHRONIC COUGH: ICD-10-CM

## 2023-03-02 DIAGNOSIS — I73.9 PVD (PERIPHERAL VASCULAR DISEASE): ICD-10-CM

## 2023-03-02 DIAGNOSIS — M54.2 NECK PAIN: ICD-10-CM

## 2023-03-02 PROBLEM — I10 ESSENTIAL HYPERTENSION: Status: ACTIVE | Noted: 2021-06-09

## 2023-03-02 PROBLEM — E11.9 CONTROLLED TYPE 2 DIABETES MELLITUS WITHOUT COMPLICATION, WITH LONG-TERM CURRENT USE OF INSULIN: Status: ACTIVE | Noted: 2021-06-14

## 2023-03-02 PROBLEM — Z98.61 S/P PTCA (PERCUTANEOUS TRANSLUMINAL CORONARY ANGIOPLASTY): Status: ACTIVE | Noted: 2021-06-14

## 2023-03-02 PROBLEM — Z79.4 CONTROLLED TYPE 2 DIABETES MELLITUS WITHOUT COMPLICATION, WITH LONG-TERM CURRENT USE OF INSULIN: Status: ACTIVE | Noted: 2021-06-14

## 2023-03-02 PROBLEM — I21.11 ST ELEVATION MYOCARDIAL INFARCTION INVOLVING RIGHT CORONARY ARTERY: Status: ACTIVE | Noted: 2021-06-09

## 2023-03-02 LAB
BILIRUB BLD-MCNC: NEGATIVE MG/DL
CLARITY, POC: CLEAR
COLOR UR: YELLOW
EXPIRATION DATE: NORMAL
GLUCOSE UR STRIP-MCNC: NEGATIVE MG/DL
KETONES UR QL: NEGATIVE
LEUKOCYTE EST, POC: NEGATIVE
Lab: NORMAL
NITRITE UR-MCNC: NEGATIVE MG/ML
PH UR: 6 [PH] (ref 5–8)
PROT UR STRIP-MCNC: NEGATIVE MG/DL
RBC # UR STRIP: NEGATIVE /UL
SP GR UR: 1.01 (ref 1–1.03)
UROBILINOGEN UR QL: NORMAL

## 2023-03-02 PROCEDURE — 99214 OFFICE O/P EST MOD 30 MIN: CPT | Performed by: NURSE PRACTITIONER

## 2023-03-02 PROCEDURE — 90715 TDAP VACCINE 7 YRS/> IM: CPT | Performed by: NURSE PRACTITIONER

## 2023-03-02 PROCEDURE — 90471 IMMUNIZATION ADMIN: CPT | Performed by: NURSE PRACTITIONER

## 2023-03-02 PROCEDURE — 2014F MENTAL STATUS ASSESS: CPT | Performed by: NURSE PRACTITIONER

## 2023-03-02 PROCEDURE — 99396 PREV VISIT EST AGE 40-64: CPT | Performed by: NURSE PRACTITIONER

## 2023-03-02 PROCEDURE — 3008F BODY MASS INDEX DOCD: CPT | Performed by: NURSE PRACTITIONER

## 2023-03-02 PROCEDURE — 72050 X-RAY EXAM NECK SPINE 4/5VWS: CPT

## 2023-03-02 PROCEDURE — 71046 X-RAY EXAM CHEST 2 VIEWS: CPT

## 2023-03-02 PROCEDURE — 73030 X-RAY EXAM OF SHOULDER: CPT

## 2023-03-02 RX ORDER — CLOPIDOGREL BISULFATE 75 MG/1
1 TABLET ORAL DAILY
COMMUNITY
Start: 2023-01-11 | End: 2023-03-02

## 2023-03-02 RX ORDER — ATORVASTATIN CALCIUM 80 MG/1
1 TABLET, FILM COATED ORAL DAILY
COMMUNITY
Start: 2023-01-11 | End: 2023-03-02

## 2023-03-02 NOTE — PROGRESS NOTES
March 2, 2023    Hello, may I speak with Yana Araya?    My name is claude      I am  with BALJEET NEWMAN [address]  [facility]  [address]  [place] [address] [phone].    Before we get started may I verify your date of birth? 1965    I am calling to officially welcome you to our practice and ask about your recent visit. Is this a good time to talk? yes    Tell me about your visit with us. What things went well?  nice went over everything       We're always looking for ways to make our patients' experiences even better. Do you have recommendations on ways we may improve?  no    Overall were you satisfied with your first visit to our practice? yes       I appreciate you taking the time to speak with me today. Is there anything else I can do for you? no      Thank you, and have a great day.

## 2023-03-02 NOTE — PROGRESS NOTES
March 2, 2023    Hello, may I speak with Yana Araya?    My name is claude      I am  with BALJEET NEWMAN [address]  [facility]  [address]  [place] [address] [phone].    Before we get started may I verify your date of birth? 1965    I am calling to officially welcome you to our practice and ask about your recent visit. Is this a good time to talk? yes    Tell me about your visit with us. What things went well?  she is nice and went over everything       We're always looking for ways to make our patients' experiences even better. Do you have recommendations on ways we may improve?  no    Overall were you satisfied with your first visit to our practice? yes       I appreciate you taking the time to speak with me today. Is there anything else I can do for you? no      Thank you, and have a great day.

## 2023-03-02 NOTE — PROGRESS NOTES
Chief Complaint  Annual Exam, Diabetes, Hyperlipidemia, and Shoulder Pain    Subjective          Yana is a 57 y.o. female  who presents to [facility]     Patient Care Team:  Melania Damico APRN as PCP - General (Family Medicine)  Darian Babcock MD as Cardiologist (Interventional Cardiology)     History of Present Illness  Yana is here today to establish care.  New patient to the office.  Previous PCP Mer Aleman in [place], [place].  Last visit about 1 year ago    Adult Annual Wellness    Social History  Tobacco use -  1 PPD  Alcohol use -  none  Drug use - none    General health habits  Last eye exam - over 1 year ago  Last dental exam - over 1 year ago, needs some teeth pulled; wears upper dentures    Diet - Regular diet    Weight / BMI - overweight, BMI 26.1  She used to weigh > 300 lbs    Exercise - walks sometimes    Reproductive health -  Sexually active - yes sometimes  Birth control - hysterectomy    Screening/prevention  Last mammogram - [facility] in the last year  Last pap smear/ cervical cancer screening: over 3 years ago  Colon cancer screening - maybe 8 years ago at [facility]  Immunizations - unsure    Patient presents for follow-up of diabetes  This is an established problem  Interim treatment changes: none  Current medications: Jardiance 25 mg daily  She does not check her sugar at home.  Unsure last [id number]    Patient presents for follow-up of hyperlipidemia  This is an established problem  Interim treatment changes: none  Current medications: atorvastatin 80 mg daily  Last lipid panel unsure date    Patient presents for follow-up of anxiety and depression  This is an established problem  Interim treatment changes: none  Current medications: paroxetine 40 mg daily      Location: Left shoulder pain  Quality: aching  Severity: moderate to severe  Duration: for several years  Timing: intermittent  Context: No injury that she can  remember.  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  Alleviating factors: massage rubs, Tylenol  Aggravating factors: stress, when it rains  Associated Symptoms: + difficulty moving her shoulder at times  She had an xray of left shoulder maybe 10 years ago        Yana Araya  has a past medical history of Anxiety, CAD (coronary artery disease), Depression, Diabetes ([facility]), Dyslipidemia, Fibromyalgia, History of inferior wall myocardial infarction, MI (myocardial infarction) ([facility]), Orthostatic hypotension, Paroxysmal atrial flutter ([facility]), Peripheral vascular disease ([facility]), and Sleep apnea.      Review of Systems   Constitutional: Positive for fatigue. Negative for fever.   HENT: Negative for ear pain, sore throat and trouble swallowing.    Eyes: Negative for visual disturbance.   Respiratory: Positive for cough. Negative for shortness of breath.    Cardiovascular: Negative for chest pain, palpitations and leg swelling.   Gastrointestinal: Negative for abdominal pain, blood in stool, constipation, diarrhea, nausea and vomiting.        + fecal incontinence   Genitourinary: Negative for dysuria, frequency and hematuria.   Musculoskeletal: Positive for neck pain.   Skin: Negative for rash.   Allergic/Immunologic: Positive for environmental allergies.   Neurological: Positive for numbness. Negative for dizziness.   Psychiatric/Behavioral: Positive for dysphoric mood. Negative for sleep disturbance.        Family History   Problem Relation Age of Onset   • Diabetes Mother    • No Known Problems Father         Past Surgical History:   Procedure Laterality Date   • COLONOSCOPY      [facility]   • CORONARY STENT PLACEMENT  06/09/2021   • FOOT SURGERY Left     removed a bone due to an ulcer; Dr. Velasco   • HYSTERECTOMY  04/23/2002    Dr. Casillas; due to abnormal cervical cells and heavy bleeding   • SALIVARY GLAND EXCISION     • TONSILLECTOMY          Social History  "    Socioeconomic History   • Marital status:    • Number of children: 2   • Highest education level: 11th grade   Tobacco Use   • Smoking status: Every Day     Packs/day: 1.00     Years: 39.00     Pack years: 39.00     Types: Cigarettes     Start date: 1984   • Smokeless tobacco: Never   Vaping Use   • Vaping Use: Never used   Substance and Sexual Activity   • Alcohol use: Never   • Drug use: Never   • Sexual activity: Yes     Partners: Male     Birth control/protection: Hysterectomy        Objective       Current Outpatient Medications:   •  acetaminophen (TYLENOL) 500 MG tablet, Take 2 tablets by mouth Every 6 (Six) Hours As Needed for Mild Pain., Disp: , Rfl:   •  apixaban (ELIQUIS) 5 MG tablet tablet, Take 1 tablet by mouth Every 12 (Twelve) Hours., Disp: , Rfl:   •  atorvastatin (LIPITOR) 80 MG tablet, Take 1 tablet by mouth Daily., Disp: , Rfl:   •  clopidogrel (PLAVIX) 75 MG tablet, Take 1 tablet by mouth Daily., Disp: , Rfl:   •  empagliflozin (JARDIANCE) 25 MG tablet tablet, Take 1 tablet by mouth Daily., Disp: , Rfl:   •  PARoxetine (PAXIL) 40 MG tablet, Take 1 tablet by mouth Daily., Disp: , Rfl:     Vital Signs:      BP 90/63 (BP Location: Right arm, Patient Position: Sitting, Cuff Size: Adult)   Pulse 88   Temp 97.4 °F (36.3 °C) (Infrared)   Resp 16   Ht 170.2 cm (67\")   Wt 75.8 kg (167 lb)   SpO2 94%   BMI 26.16 kg/m²     Vitals:    03/02/23 0851   BP: 90/63   BP Location: Right arm   Patient Position: Sitting   Cuff Size: Adult   Pulse: 88   Resp: 16   Temp: 97.4 °F (36.3 °C)   TempSrc: Infrared   SpO2: 94%   Weight: 75.8 kg (167 lb)   Height: 170.2 cm (67\")      Physical Exam  Vitals reviewed.   Constitutional:       General: She is not in acute distress.     Appearance: Normal appearance.   HENT:      Head: Normocephalic and atraumatic.      Right Ear: Tympanic membrane, ear canal and external ear normal.      Left Ear: Tympanic membrane, ear canal and external ear normal.      " Mouth/Throat:      Mouth: Mucous membranes are moist.      Pharynx: Oropharynx is clear.   Eyes:      General: No scleral icterus.     Conjunctiva/sclera: Conjunctivae normal.   Neck:      Thyroid: No thyromegaly.   Cardiovascular:      Rate and Rhythm: Normal rate and regular rhythm.      Heart sounds: Normal heart sounds.   Pulmonary:      Effort: Pulmonary effort is normal. No respiratory distress.      Breath sounds: Normal breath sounds. No wheezing.   Abdominal:      General: Bowel sounds are normal. There is no distension.      Palpations: Abdomen is soft. There is no mass.      Tenderness: There is no abdominal tenderness. There is no right CVA tenderness, left CVA tenderness, guarding or rebound.   Musculoskeletal:      Left shoulder: Normal.      Cervical back: Neck supple. Normal range of motion.      Right lower leg: No edema.      Left lower leg: No edema.   Lymphadenopathy:      Cervical: No cervical adenopathy.   Skin:     General: Skin is warm and dry.   Neurological:      Mental Status: She is alert and oriented to person, place, and time.   Psychiatric:         Mood and Affect: Mood normal.        Result Review :                Office Visit on 03/02/2023   Component Date Value Ref Range Status   • Creatinine, Urine 03/02/2023 34.6  Not Estab. mg/dL Final   • Microalbumin, Urine 03/02/2023 4.6  Not Estab. ug/mL Final   • Microalbumin/Creatinine Ratio 03/02/2023 13  0 - 29 mg/g creat Final    Comment:                        Normal:                0 -  29                         Moderately increased: 30 - 300                         Severely increased:       >300     • Color 03/02/2023 Yellow  Yellow, Straw, Dark Yellow, Monica Final   • Clarity, UA 03/02/2023 Clear  Clear Final   • Specific Gravity  03/02/2023 1.015  1.005 - 1.030 Final   • pH, Urine 03/02/2023 6.0  5.0 - 8.0 Final   • Leukocytes 03/02/2023 Negative  Negative Final   • Nitrite, UA 03/02/2023 Negative  Negative Final   • Protein, POC  03/02/2023 Negative  Negative mg/dL Final   • Glucose, UA 03/02/2023 Negative  Negative mg/dL Final   • Ketones, UA 03/02/2023 Negative  Negative Final   • Urobilinogen, UA 03/02/2023 Normal  Normal, 0.2 E.U./dL Final   • Bilirubin 03/02/2023 Negative  Negative Final   • Blood, UA 03/02/2023 Negative  Negative Final   • Lot Number 03/02/2023 na   Final   • Expiration Date 03/02/2023 na   Final         PHQ-9 Depression Screening  Little interest or pleasure in doing things? 1-->several days   Feeling down, depressed, or hopeless? 1-->several days   Trouble falling or staying asleep, or sleeping too much? 3-->nearly every day   Feeling tired or having little energy? 3-->nearly every day   Poor appetite or overeating?     Feeling bad about yourself - or that you are a failure or have let yourself or your family down? 0-->not at all   Trouble concentrating on things, such as reading the newspaper or watching television? 0-->not at all   Moving or speaking so slowly that other people could have noticed? Or the opposite - being so fidgety or restless that you have been moving around a lot more than usual? 0-->not at all   Thoughts that you would be better off dead, or of hurting yourself in some way? 0-->not at all   PHQ-9 Total Score 8   If you checked off any problems, how difficult have these problems made it for you to do your work, take care of things at home, or get along with other people? somewhat difficult            Assessment and Plan    Diagnoses and all orders for this visit:    1. Wellness examination (Primary)  Assessment & Plan:  Discussed preventative health care.  Labs ordered.  Will request a copy of her colonoscopy and mammogram done at [facility].  Tdap given.  Recommended annual eye and dental exams.    Orders:  -     Lipid Panel  -     Comprehensive Metabolic Panel  -     CBC & Differential  -     TSH Rfx On Abnormal To Free T4    2. Type 2 diabetes mellitus with diabetic neuropathy,  without long-term current use of insulin ([facility])  Assessment & Plan:  Continue Jardiance 25 mg daily.  Check HgbA1c and urine microalbumin creatinine ratio.    Orders:  -     Comprehensive Metabolic Panel  -     Hemoglobin A1c  -     Microalbumin / Creatinine Urine Ratio - Urine, Clean Catch  -     POC Urinalysis Dipstick, Automated    3. Dyslipidemia  Assessment & Plan:  Continue atorvastatin 80 mg daily.  Check fasting lipid panel.    Orders:  -     Lipid Panel    4. PVD (peripheral vascular disease) ([facility])  Overview:  Per cardiology note - She had a CTA with run off. This documents CTA with borderline multifocal stenosis in the L common iliac artery, R > L with diffuse SFA disease, three vessel run off bilaterally, severe nolvia superficial varicosites the majority appeared to be arising from the bilateral greater saphenous veins, the right common femoral artery may represent dissection flap vs. Residual chronic thrombus. Dr. Menard spoke with Dr. Babcock. She is suppose to have surgery but Medicaid will not approve.          5. Coronary artery disease involving native coronary artery of native heart without angina pectoris  Assessment & Plan:  Follow up with cardiology as scheduled.      6. Atrial flutter, paroxysmal ([facility])  Overview:  Per cardiology note - occurred post MI. Arrhythmia has not been documented in recurrence. She remains on oral anticoagulation due to elevated cardiac embolic risk score.        7. Chronic left shoulder pain  Assessment & Plan:  Ordered shoulder x-ray.    Orders:  -     XR Shoulder 2+ View Left    8. Neck pain  Comments:  Ordered cervical spine x-ray  Orders:  -     XR Spine Cervical Complete 4 or 5 View    9. Incontinence of feces, unspecified fecal incontinence type  Comments:  Refer to gastroenterology  Orders:  -     Ambulatory Referral to Gastroenterology    10. Chronic cough  Comments:  Ordered chest x-ray  Orders:  -     XR Chest PA & Lateral    11. Tobacco user  Assessment &  Plan:  Yana Araya  reports that she has been smoking cigarettes. She started smoking about 39 years ago. She has a 39.00 pack-year smoking history. She has never used smokeless tobacco.. I have educated her on the risk of diseases from using tobacco products such as cancer, COPD and heart disease.     I advised her to quit and she is not ready to quit at this time.  Discussed smoking cessation and that it is one of the best things you can do for your health.  I spent 3  minutes counseling the patient.           12. Need for hepatitis C screening test  -     Hepatitis C Antibody    13. Need for Tdap vaccination  -     Tdap Vaccine Greater Than or Equal To 6yo IM    14. Influenza vaccination declined    15. Overweight (BMI 25.0-29.9)  Assessment & Plan:  Patient's (Body mass index is 26.16 kg/m².) indicates that they are overweight with health conditions that include coronary heart disease, diabetes mellitus and dyslipidemias . Weight is newly identified. BMI  is above average; BMI management plan is completed. We discussed portion control and increasing exercise.              Follow Up   Return in about 2 weeks (around 3/16/2023) for Recheck.  Patient was given instructions and counseling regarding her condition or for health maintenance advice. Please see specific information pulled into the AVS if appropriate.    There are no Patient Instructions on file for this visit.

## 2023-03-03 ENCOUNTER — TELEPHONE (OUTPATIENT)
Dept: FAMILY MEDICINE CLINIC | Facility: CLINIC | Age: 58
End: 2023-03-03
Payer: MEDICAID

## 2023-03-03 DIAGNOSIS — M54.2 NECK PAIN: ICD-10-CM

## 2023-03-03 DIAGNOSIS — M25.512 CHRONIC LEFT SHOULDER PAIN: Primary | ICD-10-CM

## 2023-03-03 DIAGNOSIS — G89.29 CHRONIC LEFT SHOULDER PAIN: Primary | ICD-10-CM

## 2023-03-03 PROBLEM — Z00.00 WELLNESS EXAMINATION: Status: ACTIVE | Noted: 2023-03-03

## 2023-03-03 PROBLEM — I73.9 PVD (PERIPHERAL VASCULAR DISEASE): Status: ACTIVE | Noted: 2023-03-03

## 2023-03-03 PROBLEM — I48.0 PAROXYSMAL ATRIAL FIBRILLATION: Status: RESOLVED | Noted: 2023-03-03 | Resolved: 2023-03-03

## 2023-03-03 PROBLEM — E66.3 OVERWEIGHT (BMI 25.0-29.9): Status: ACTIVE | Noted: 2023-03-03

## 2023-03-03 PROBLEM — I48.0 PAROXYSMAL ATRIAL FIBRILLATION: Status: ACTIVE | Noted: 2023-03-03

## 2023-03-03 PROBLEM — I25.10 CORONARY ARTERY DISEASE INVOLVING NATIVE CORONARY ARTERY OF NATIVE HEART WITHOUT ANGINA PECTORIS: Status: ACTIVE | Noted: 2023-03-03

## 2023-03-03 LAB
ALBUMIN/CREAT UR: 13 MG/G CREAT (ref 0–29)
CREAT UR-MCNC: 34.6 MG/DL
MICROALBUMIN UR-MCNC: 4.6 UG/ML

## 2023-03-03 NOTE — TELEPHONE ENCOUNTER
She states she had a colonoscopy at [facility] in the last few years.  Please ask for report.

## 2023-03-03 NOTE — PROGRESS NOTES
Let her know that her left shoulder xray shows changes of arthritis.  Since she has been having this pain for years, refer to ortho if she would like.

## 2023-03-03 NOTE — PROGRESS NOTES
Neck xray shows disc space narrowing from C5 to T1. No fractures.  She has changes of arthritis.  These findings may also be contributing to her shoulder pain.  I recommend she go to physical therapy for evaluation and treatment.  If not better with PT, consider ordering a MRI of her neck.

## 2023-03-03 NOTE — ASSESSMENT & PLAN NOTE
Patient's (Body mass index is 26.16 kg/m².) indicates that they are overweight with health conditions that include coronary heart disease, diabetes mellitus and dyslipidemias . Weight is newly identified. BMI  is above average; BMI management plan is completed. We discussed portion control and increasing exercise.

## 2023-03-03 NOTE — TELEPHONE ENCOUNTER
She states she had a mammogram at [facility] in the last year.  Please ask for report.

## 2023-03-03 NOTE — PROGRESS NOTES
Urine microalbumin/creatinine ratio is normal.  This is a test to detect early kidney disease in people with diabetes or other risk factors such as high blood pressure.

## 2023-03-03 NOTE — ASSESSMENT & PLAN NOTE
Yana Araya  reports that she has been smoking cigarettes. She started smoking about 39 years ago. She has a 39.00 pack-year smoking history. She has never used smokeless tobacco.. I have educated her on the risk of diseases from using tobacco products such as cancer, COPD and heart disease.     I advised her to quit and she is not ready to quit at this time.  Discussed smoking cessation and that it is one of the best things you can do for your health.  I spent 3  minutes counseling the patient.

## 2023-03-03 NOTE — ASSESSMENT & PLAN NOTE
Discussed preventative health care.  Labs ordered.  Will request a copy of her colonoscopy and mammogram done at [facility].  Tdap given.  Recommended annual eye and dental exams.

## 2023-03-06 DIAGNOSIS — Z12.31 BREAST CANCER SCREENING BY MAMMOGRAM: Primary | ICD-10-CM

## 2023-03-10 ENCOUNTER — OFFICE VISIT (OUTPATIENT)
Dept: ORTHOPEDIC SURGERY | Facility: CLINIC | Age: 58
End: 2023-03-10
Payer: MEDICAID

## 2023-03-10 VITALS — HEIGHT: 67 IN | OXYGEN SATURATION: 95 % | WEIGHT: 167 LBS | BODY MASS INDEX: 26.21 KG/M2

## 2023-03-10 DIAGNOSIS — M75.92 SUPRASPINATUS TENDINITIS, LEFT: Primary | ICD-10-CM

## 2023-03-10 DIAGNOSIS — M19.012 GLENOHUMERAL ARTHRITIS, LEFT: ICD-10-CM

## 2023-03-10 PROCEDURE — 20610 DRAIN/INJ JOINT/BURSA W/O US: CPT | Performed by: PHYSICIAN ASSISTANT

## 2023-03-10 PROCEDURE — 1160F RVW MEDS BY RX/DR IN RCRD: CPT | Performed by: PHYSICIAN ASSISTANT

## 2023-03-10 PROCEDURE — 1159F MED LIST DOCD IN RCRD: CPT | Performed by: PHYSICIAN ASSISTANT

## 2023-03-10 PROCEDURE — 99213 OFFICE O/P EST LOW 20 MIN: CPT | Performed by: PHYSICIAN ASSISTANT

## 2023-03-10 RX ORDER — LIDOCAINE HYDROCHLORIDE 10 MG/ML
2 INJECTION, SOLUTION EPIDURAL; INFILTRATION; INTRACAUDAL; PERINEURAL
Status: COMPLETED | OUTPATIENT
Start: 2023-03-10 | End: 2023-03-10

## 2023-03-10 RX ADMIN — LIDOCAINE HYDROCHLORIDE 2 ML: 10 INJECTION, SOLUTION EPIDURAL; INFILTRATION; INTRACAUDAL; PERINEURAL at 11:26

## 2023-03-10 NOTE — PROGRESS NOTES
"Yana is a 57 y.o. year old female presents to [facility]    Chief Complaint   Patient presents with   • Left Shoulder - Initial Evaluation       History of Present Illness  Yana Araya is a 57 y.o. female presents to clinic for evaluation left shoulder pain that has been present for years (~2003). She was initially evaluated by her PCP who sent her to physical therapy (> 3 months) that ultimately provided no relief of pain. In fact patient believes it worsened the shoulder pain. 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 Reports worsening of pain and weakness in the left arm over the years. Weakness with lifting a mug from a top cabinet. Significant pain at night.       I have reviewed the patient's medical, family, and social history in detail and updated the computerized patient record.    Objective:  Ht 170.2 cm (67\")   Wt 75.8 kg (167 lb)   SpO2 95%   BMI 26.16 kg/m²      Physical Exam    Mask worn thru encounter  Vital signs reviewed.   General: No acute distress.  Eyes: conjunctiva clear  ENT: external ears atraumatic  CV: no peripheral edema  Resp: normal respiratory effort  Skin: no rashes or wounds; normal turgor  Psych: mood and affect appropriate; recent and remote memory intact  Neuro: sensation to light touch intact    MSK Exam  Left shoulder:    No tenderness to bicep groove nor ac joint    Active fwd flexion 95, abduction 90, ER 35, IR T12  Passive fwd flexion 120 with pain, abduction 115  Positive Neer  Negative speed  Pain and weakness with supraspinatus and O'rj testing    Belly press 5/5; lift off 4/5 with pain    Imaging:  XR Shoulder 2+ View Left (03/02/2023 11:25)    Findings:  There is joint space narrowing and marginal spur formation at the glenohumeral joint. There are AC joint hypertrophic changes as well. There are cystic degenerative changes present at the greater tuberosity. No fractures are " identified.     IMPRESSION:    Radiographic findings consistent with osteoarthritis.     Assessment:  Diagnoses and all orders for this visit:    Supraspinatus tendinitis, left    Glenohumeral arthritis, left    Other orders  -     Large Joint Arthrocentesis      Plan: I recommend subacromial injection today to provide decreased pain and increased mobility so she may perform her at [facility] for the left shoulder. Patient was advised to closely monitor her her POC glucose over the next 3-5 days and to self-administer insulin, per her PCP recommendations, if glucose gets too high. All questions answered. Follow up as needed. If symptoms persist may consider MRI.      Risks and benefits of the injection were discussed. Under sterile technique and written consent I injected 80mg of Kenalog and 2cc of 1% Lidocaine plain into the shoulder and subacromial space. It was well tolerated. Postinjection instructions were given.    Large Joint Arthrocentesis: L subacromial bursa  Date/Time: 3/10/2023 11:26 AM  Consent given by: patient  Timeout: Immediately prior to procedure a time out was called to verify the correct patient, procedure, equipment, support staff and site/side marked as required   Supporting Documentation  Indications: pain   Procedure Details  Location: shoulder - L subacromial bursa  Needle size: 25 G  Approach: lateral  Medications administered: 80 mg Triamcinolone Acetonide 80 MG/ML; 2 mL lidocaine PF 1% 1 %  Aspirate amount: 0 mL  Patient tolerance: patient tolerated the procedure well with no immediate complications          Follow Up   Return if symptoms worsen or fail to improve.  Patient was given instructions and counseling regarding her condition or for health maintenance advice. Please see specific information pulled into the AVS if appropriate.     EMR Dragon/Transcription disclaimer:    Much of this encounter note is an electronic transcription/translation of spoken language to printed text.  The  electronic translation of spoken language may permit erroneous, or at times, nonsensical words or phrases to be inadvertently transcribed.  Although I have reviewed the note for such errors some may still exist.

## 2023-03-23 ENCOUNTER — OFFICE VISIT (OUTPATIENT)
Dept: FAMILY MEDICINE CLINIC | Facility: CLINIC | Age: 58
End: 2023-03-23
Payer: MEDICAID

## 2023-03-23 VITALS
BODY MASS INDEX: 25.18 KG/M2 | RESPIRATION RATE: 18 BRPM | SYSTOLIC BLOOD PRESSURE: 128 MMHG | HEART RATE: 92 BPM | WEIGHT: 160.4 LBS | OXYGEN SATURATION: 98 % | TEMPERATURE: 97.3 F | DIASTOLIC BLOOD PRESSURE: 72 MMHG | HEIGHT: 67 IN

## 2023-03-23 DIAGNOSIS — Z72.0 TOBACCO USER: ICD-10-CM

## 2023-03-23 DIAGNOSIS — J01.10 ACUTE FRONTAL SINUSITIS, RECURRENCE NOT SPECIFIED: ICD-10-CM

## 2023-03-23 DIAGNOSIS — J41.1 MUCOPURULENT CHRONIC BRONCHITIS: Primary | ICD-10-CM

## 2023-03-23 RX ORDER — CEPHALEXIN 500 MG/1
500 CAPSULE ORAL EVERY 8 HOURS
Qty: 30 CAPSULE | Refills: 0 | Status: SHIPPED | OUTPATIENT
Start: 2023-03-23 | End: 2023-04-02

## 2023-03-23 RX ORDER — ALBUTEROL SULFATE 90 UG/1
AEROSOL, METERED RESPIRATORY (INHALATION)
Qty: 18 G | Refills: 1 | Status: SHIPPED | OUTPATIENT
Start: 2023-03-23

## 2023-03-23 RX ORDER — PREDNISONE 20 MG/1
20 TABLET ORAL 2 TIMES DAILY
Qty: 10 TABLET | Refills: 0 | Status: SHIPPED | OUTPATIENT
Start: 2023-03-23 | End: 2023-03-28

## 2023-03-23 NOTE — PROGRESS NOTES
Chief Complaint  Cough (x 3 Weeks ) and Sinusitis    Subjective          Yana is a 57 y.o. female  who presents to [facility]     Patient Care Team:  Melania Damico APRN as PCP - General (Family Medicine)  Dairan Babcock MD as Cardiologist (Interventional Cardiology)     History of Present Illness  Yana is here today for a cough and sinus pain/ pressure  Location: Cough and frontal sinus pain/pressure  Quality: dry cough  Severity: moderate  Duration: for 3 weeks  Timing: persistent  Context: Hx allergies  Tobacco smoker.  States she gets bronchitis a few times a year.  Alleviating factors: nothing makes better  Aggravating factors: exertion  Associated Symptoms: no fever, + congestion, + post nasal drainage      Yana Araya  has a past medical history of Anxiety, CAD (coronary artery disease), Depression, Diabetes ([facility]), Dyslipidemia, Fibromyalgia, History of inferior wall myocardial infarction, MI (myocardial infarction) (Formerly [facility]), Orthostatic hypotension, Paroxysmal atrial flutter ([facility]ly [facility]), Peripheral vascular disease (Formerly [facility]), and Sleep apnea.      Review of Systems   Constitutional: Positive for fatigue. Negative for fever.   HENT: Positive for congestion, ear pain (left; pressure), postnasal drip, rhinorrhea, sinus pressure, sinus pain and sneezing. Negative for sore throat.    Respiratory: Positive for cough and shortness of breath (after coughing).    Cardiovascular: Negative for chest pain.   Gastrointestinal: Negative for abdominal pain, nausea and vomiting.   Musculoskeletal: Positive for arthralgias (chronic).   Skin: Negative for rash.   Allergic/Immunologic: Positive for environmental allergies.        Family History   Problem Relation Age of Onset   • Diabetes Mother    • No Known Problems Father         Past Surgical History:   Procedure Laterality Date   • COLONOSCOPY      [facility]   • CORONARY STENT PLACEMENT  06/09/2021   • FOOT  SURGERY Left     removed a bone due to an ulcer; Dr. Velasco   • HYSTERECTOMY  04/23/2002    Dr. Casillas; due to abnormal cervical cells and heavy bleeding   • SALIVARY GLAND EXCISION     • TONSILLECTOMY          Social History     Socioeconomic History   • Marital status:    • Number of children: 2   • Highest education level: 11th grade   Tobacco Use   • Smoking status: Every Day     Packs/day: 1.00     Years: 39.00     Pack years: 39.00     Types: Cigarettes     Start date: 1984   • Smokeless tobacco: Never   Vaping Use   • Vaping Use: Never used   Substance and Sexual Activity   • Alcohol use: Never   • Drug use: Never   • Sexual activity: Yes     Partners: Male     Birth control/protection: Hysterectomy        Immunization History   Administered Date(s) Administered   • Tdap 03/02/2023       Objective       Current Outpatient Medications:   •  acetaminophen (TYLENOL) 500 MG tablet, Take 2 tablets by mouth Every 6 (Six) Hours As Needed for Mild Pain., Disp: , Rfl:   •  apixaban (ELIQUIS) 5 MG tablet tablet, Take 1 tablet by mouth Every 12 (Twelve) Hours., Disp: , Rfl:   •  atorvastatin (LIPITOR) 80 MG tablet, Take 1 tablet by mouth Daily., Disp: , Rfl:   •  clopidogrel (PLAVIX) 75 MG tablet, Take 1 tablet by mouth Daily., Disp: , Rfl:   •  empagliflozin (JARDIANCE) 25 MG tablet tablet, Take 1 tablet by mouth Daily., Disp: , Rfl:   •  PARoxetine (PAXIL) 40 MG tablet, Take 1 tablet by mouth Daily., Disp: , Rfl:   •  albuterol sulfate  (90 Base) MCG/ACT inhaler, 1 to 2 puffs inhalation every 4-6 hours as needed for cough and shortness of breath, Disp: 18 g, Rfl: 1  •  cephalexin (Keflex) 500 MG capsule, Take 1 capsule by mouth Every 8 (Eight) Hours for 10 days., Disp: 30 capsule, Rfl: 0  •  predniSONE (DELTASONE) 20 MG tablet, Take 1 tablet by mouth 2 (Two) Times a Day for 5 days., Disp: 10 tablet, Rfl: 0    Vital Signs:      /72   Pulse 92   Temp 97.3 °F (36.3 °C) (Infrared)   Resp 18   Ht  "170.2 cm (67.01\")   Wt 72.8 kg (160 lb 6.4 oz)   SpO2 98%   BMI 25.12 kg/m²     Vitals:    03/23/23 1639   BP: 128/72   Pulse: 92   Resp: 18   Temp: 97.3 °F (36.3 °C)   TempSrc: Infrared   SpO2: 98%   Weight: 72.8 kg (160 lb 6.4 oz)   Height: 170.2 cm (67.01\")      Physical Exam  Vitals reviewed.   Constitutional:       General: She is not in acute distress.     Appearance: Normal appearance.   HENT:      Head: Normocephalic and atraumatic.      Right Ear: Ear canal and external ear normal. Tympanic membrane is retracted.      Left Ear: Ear canal and external ear normal. Tympanic membrane is retracted.      Nose: Congestion and rhinorrhea present.      Right Sinus: Frontal sinus tenderness present. No maxillary sinus tenderness.      Left Sinus: Frontal sinus tenderness present. No maxillary sinus tenderness.      Mouth/Throat:      Mouth: Mucous membranes are moist.      Pharynx: Oropharynx is clear. Uvula midline. Posterior oropharyngeal erythema present.      Tonsils: No tonsillar exudate.   Eyes:      General: No scleral icterus.     Conjunctiva/sclera: Conjunctivae normal.   Neck:      Thyroid: No thyromegaly.   Cardiovascular:      Rate and Rhythm: Normal rate and regular rhythm.      Heart sounds: Normal heart sounds.   Pulmonary:      Effort: Pulmonary effort is normal. No accessory muscle usage or respiratory distress.      Breath sounds: Decreased air movement present. Wheezing and rhonchi present.   Musculoskeletal:      Cervical back: Neck supple.      Right lower leg: No edema.      Left lower leg: No edema.   Lymphadenopathy:      Cervical: No cervical adenopathy.   Skin:     General: Skin is warm and dry.   Neurological:      Mental Status: She is alert and oriented to person, place, and time.   Psychiatric:         Mood and Affect: Mood normal.        Result Review :                     Assessment and Plan    Diagnoses and all orders for this visit:    1. Mucopurulent chronic bronchitis (HCC) " (Primary)  -     predniSONE (DELTASONE) 20 MG tablet; Take 1 tablet by mouth 2 (Two) Times a Day for 5 days.  Dispense: 10 tablet; Refill: 0  -     cephalexin (Keflex) 500 MG capsule; Take 1 capsule by mouth Every 8 (Eight) Hours for 10 days.  Dispense: 30 capsule; Refill: 0  -     albuterol sulfate  (90 Base) MCG/ACT inhaler; 1 to 2 puffs inhalation every 4-6 hours as needed for cough and shortness of breath  Dispense: 18 g; Refill: 1    2. Acute frontal sinusitis, recurrence not specified    3. Tobacco user  Comments:  Encouraged smoking cessation.       Begin antibiotic and prednisone.  Begin albuterol inhaler as needed for cough/wheezing. Use Mucinex as directed on box to thin mucus. Push fluids. Follow up for recheck in 1-2 weeks or sooner if needed.      Follow Up   Return if symptoms worsen or fail to improve.  Patient was given instructions and counseling regarding her condition or for health maintenance advice. Please see specific information pulled into the AVS if appropriate.    There are no Patient Instructions on file for this visit.

## 2023-07-18 ENCOUNTER — OFFICE (OUTPATIENT)
Dept: URBAN - METROPOLITAN AREA CLINIC 64 | Facility: CLINIC | Age: 58
End: 2023-07-18
Payer: COMMERCIAL

## 2023-07-18 VITALS
WEIGHT: 162 LBS | HEIGHT: 66 IN | SYSTOLIC BLOOD PRESSURE: 96 MMHG | HEART RATE: 94 BPM | DIASTOLIC BLOOD PRESSURE: 61 MMHG

## 2023-07-18 DIAGNOSIS — R15.9 FULL INCONTINENCE OF FECES: ICD-10-CM

## 2023-07-18 PROCEDURE — 99204 OFFICE O/P NEW MOD 45 MIN: CPT | Performed by: INTERNAL MEDICINE

## 2023-08-14 RX ORDER — PAROXETINE HYDROCHLORIDE 40 MG/1
40 TABLET, FILM COATED ORAL DAILY
OUTPATIENT
Start: 2023-08-14

## 2023-08-14 NOTE — TELEPHONE ENCOUNTER
Incoming Refill Request      Medication requested (name and dose):     apixaban (ELIQUIS) 5 MG tablet tablet  5 mg, Every 12 Hours Scheduled       empagliflozin (JARDIANCE) 25 MG tablet tablet  1 tablet, Daily       PARoxetine (PAXIL) 40 MG tablet  40 mg, Daily     Pharmacy where request should be sent:     [facility] [address] [place] [address]   - [phone]  - [phone]  [phone]    Additional details provided by patient: PATIENT WAS PRESCRIBE THESE BY HER PREVIOUS DOCTOR AND IS NEEDING REFILLS     SHE DID SCHEDULE AN APPOINTMENT FOR 08/23  Best call back number: [phone]    Does the patient have less than a 3 day supply:  [x] Yes  [] No    Rajeev Estrada Rep  08/14/23, 11:13 EDT

## 2023-08-22 PROBLEM — E11.319 DIABETIC RETINOPATHY: Status: ACTIVE | Noted: 2023-08-22

## 2023-08-23 ENCOUNTER — OFFICE VISIT (OUTPATIENT)
Dept: FAMILY MEDICINE CLINIC | Facility: CLINIC | Age: 58
End: 2023-08-23
Payer: MEDICAID

## 2023-08-23 VITALS
HEIGHT: 67 IN | OXYGEN SATURATION: 97 % | HEART RATE: 85 BPM | TEMPERATURE: 98.2 F | WEIGHT: 162.8 LBS | SYSTOLIC BLOOD PRESSURE: 110 MMHG | BODY MASS INDEX: 25.55 KG/M2 | DIASTOLIC BLOOD PRESSURE: 70 MMHG | RESPIRATION RATE: 18 BRPM

## 2023-08-23 DIAGNOSIS — Z12.11 COLON CANCER SCREENING: ICD-10-CM

## 2023-08-23 DIAGNOSIS — B37.31 VAGINAL CANDIDIASIS: ICD-10-CM

## 2023-08-23 DIAGNOSIS — M79.672 LEFT FOOT PAIN: ICD-10-CM

## 2023-08-23 DIAGNOSIS — I73.9 PVD (PERIPHERAL VASCULAR DISEASE): ICD-10-CM

## 2023-08-23 DIAGNOSIS — E11.40 TYPE 2 DIABETES MELLITUS WITH DIABETIC NEUROPATHY, WITHOUT LONG-TERM CURRENT USE OF INSULIN: ICD-10-CM

## 2023-08-23 DIAGNOSIS — I25.10 CORONARY ARTERY DISEASE INVOLVING NATIVE CORONARY ARTERY OF NATIVE HEART WITHOUT ANGINA PECTORIS: ICD-10-CM

## 2023-08-23 DIAGNOSIS — I48.92 ATRIAL FLUTTER, PAROXYSMAL: ICD-10-CM

## 2023-08-23 DIAGNOSIS — Z23 PNEUMOCOCCAL VACCINE ADMINISTERED: ICD-10-CM

## 2023-08-23 DIAGNOSIS — F17.210 TOBACCO DEPENDENCE DUE TO CIGARETTES: ICD-10-CM

## 2023-08-23 DIAGNOSIS — E78.5 DYSLIPIDEMIA: ICD-10-CM

## 2023-08-23 LAB
EXPIRATION DATE: NORMAL
HBA1C MFR BLD: 14 %
Lab: NORMAL

## 2023-08-23 RX ORDER — NYSTATIN 100000 U/G
1 CREAM TOPICAL 2 TIMES DAILY
Qty: 60 G | Refills: 1 | Status: SHIPPED | OUTPATIENT
Start: 2023-08-23

## 2023-08-23 RX ORDER — FLUCONAZOLE 150 MG/1
150 TABLET ORAL ONCE
Qty: 1 TABLET | Refills: 0 | Status: SHIPPED | OUTPATIENT
Start: 2023-08-23 | End: 2023-08-23

## 2023-08-23 RX ORDER — LANCETS 30 GAUGE
1 EACH MISCELLANEOUS 2 TIMES DAILY
Qty: 100 EACH | Refills: 2 | Status: SHIPPED | OUTPATIENT
Start: 2023-08-23

## 2023-08-23 NOTE — PROGRESS NOTES
Chief Complaint  Vaginal Itching (Dryness. White area noticed about 6 months ago.  Pt states she has been using [place] jelly), Toe Pain (Toe surgery over a year ago with Dr Velasco. Pt concerned toe does not look right. ), Diabetes, and Hyperlipidemia    Yolande Millan is a 58 y.o. female  who presents to [facility]     Patient Care Team:  Melania Damico APRN as PCP - General (Family Medicine)  Darian Babcock MD as Cardiologist (Interventional Cardiology)     History of Present Illness  Patient presents for follow-up of diabetes  This is an established problem  Interim treatment changes: none  Current medications: Jardiance 25 mg daily  Checking blood sugar at home: No.  States her meter does not work  Unsure last [id number]  Previous medications: metformin and insulin.  She is unsure why she stopped them     Patient presents for follow-up of hyperlipidemia  This is an established problem  Interim treatment changes: none  Current medications: atorvastatin 80 mg daily  Last lipid panel unsure date     Patient presents for follow-up of anxiety and depression  This is an established problem  Interim treatment changes: none  Current medications: paroxetine 40 mg daily    She was seen on 3/2/2023 and labs were ordered.  She has not completed labwork yet.    She had surgery on left toe by Dr. Velasco in June 2022.  She continues to have pain in left great toe    Vaginal itching and vaginal discharge      Yana Araya  has a past medical history of Anxiety, CAD (coronary artery disease), Depression, Diabetes, Diabetic retinopathy (08/22/2023), Dyslipidemia, Fibromyalgia, History of inferior wall myocardial infarction, MI (myocardial infarction), Orthostatic hypotension, Paroxysmal atrial flutter, Peripheral vascular disease, and Sleep apnea.      Review of Systems   Constitutional:  Positive for fatigue. Negative for fever.   Cardiovascular:  Positive for leg swelling.  Negative for chest pain and palpitations.   Endocrine: Positive for polydipsia. Negative for polyphagia and polyuria.   Genitourinary:  Positive for vaginal discharge.        + vaginal dryness   Musculoskeletal:  Positive for arthralgias (left foot/great toe). Negative for back pain.   Neurological:  Positive for weakness (left leg) and numbness.      Family History   Problem Relation Age of Onset    Diabetes Mother     No Known Problems Father         Past Surgical History:   Procedure Laterality Date    COLONOSCOPY  04/25/2014    polyps; Dr. Welsh    CORONARY STENT PLACEMENT  06/09/2021    FOOT SURGERY Left 06/28/2022    Left first MTP J arthroplasty without implant/Ochoa arthroplasty; Plantar left great toe ulceration debridement with skin graft application; Dr. Tyree Velasco at [facility]    HYSTERECTOMY  04/23/2002    Dr. Casillas; due to abnormal cervical cells and heavy bleeding    SALIVARY GLAND EXCISION      TONSILLECTOMY          Social History     Socioeconomic History    Marital status:     Number of children: 2    Highest education level: 11th grade   Tobacco Use    Smoking status: Every Day     Packs/day: 1.00     Years: 39.00     Pack years: 39.00     Types: Cigarettes     Start date: 1984    Smokeless tobacco: Never   Vaping Use    Vaping Use: Never used   Substance and Sexual Activity    Alcohol use: Never    Drug use: Never    Sexual activity: Yes     Partners: Male     Birth control/protection: Hysterectomy        Immunization History   Administered Date(s) Administered    Pneumococcal Conjugate 20-Valent (PCV20) 08/23/2023    Tdap 03/02/2023       Objective       Current Outpatient Medications:     acetaminophen (TYLENOL) 500 MG tablet, Take 2 tablets by mouth Every 6 (Six) Hours As Needed for Mild Pain., Disp: , Rfl:     albuterol sulfate  (90 Base) MCG/ACT inhaler, 1 to 2 puffs inhalation every 4-6 hours as needed for cough and shortness of breath, Disp: 18 g, Rfl: 1     "apixaban (ELIQUIS) 5 MG tablet tablet, Take 1 tablet by mouth Every 12 (Twelve) Hours., Disp: , Rfl:     atorvastatin (LIPITOR) 80 MG tablet, Take 1 tablet by mouth Daily., Disp: , Rfl:     clopidogrel (PLAVIX) 75 MG tablet, Take 1 tablet by mouth Daily., Disp: , Rfl:     empagliflozin (JARDIANCE) 25 MG tablet tablet, Take 1 tablet by mouth Daily., Disp: , Rfl:     PARoxetine (PAXIL) 40 MG tablet, Take 1 tablet by mouth Daily., Disp: , Rfl:     Blood Glucose Monitoring Suppl w/Device kit, 1 Device Daily. DX E11.9, Disp: 1 each, Rfl: 0    glucose blood test strip, Blood sugar twice daily diabetes E11.9, Disp: 100 each, Rfl: 12    Insulin Glargine (LANTUS SOLOSTAR) 100 UNIT/ML injection pen, Inject 10 Units under the skin into the appropriate area as directed Daily., Disp: 3 mL, Rfl: 2    Lancets misc, 1 container 2 (Two) Times a Day. DX E11.9, Disp: 100 each, Rfl: 2    nystatin (MYCOSTATIN) 401686 UNIT/GM cream, Apply 1 application  topically to the appropriate area as directed 2 (Two) Times a Day., Disp: 60 g, Rfl: 1    Vital Signs:      /70   Pulse 85   Temp 98.2 øF (36.8 øC) (Infrared)   Resp 18   Ht 170.2 cm (67.01\")   Wt 73.8 kg (162 lb 12.8 oz)   SpO2 97%   BMI 25.49 kg/mý     Vitals:    08/23/23 0754   BP: 110/70   Pulse: 85   Resp: 18   Temp: 98.2 øF (36.8 øC)   TempSrc: Infrared   SpO2: 97%   Weight: 73.8 kg (162 lb 12.8 oz)   Height: 170.2 cm (67.01\")      Physical Exam  Vitals reviewed.   Constitutional:       General: She is not in acute distress.     Appearance: Normal appearance. She is not diaphoretic.   HENT:      Head: Normocephalic and atraumatic.      Right Ear: Tympanic membrane, ear canal and external ear normal.      Left Ear: Tympanic membrane, ear canal and external ear normal.      Mouth/Throat:      Mouth: Mucous membranes are moist.      Pharynx: Oropharynx is clear.   Eyes:      General: No scleral icterus.     Conjunctiva/sclera: Conjunctivae normal.   Cardiovascular:      " Rate and Rhythm: Normal rate and regular rhythm.      Pulses:           Dorsalis pedis pulses are 1+ on the right side and 1+ on the left side.        Posterior tibial pulses are 1+ on the right side and 1+ on the left side.      Heart sounds: Normal heart sounds.   Pulmonary:      Effort: Pulmonary effort is normal. No respiratory distress.      Breath sounds: Normal breath sounds. No wheezing.   Abdominal:      General: Bowel sounds are normal. There is no distension.      Palpations: Abdomen is soft. There is no mass.      Tenderness: There is no abdominal tenderness. There is no right CVA tenderness, left CVA tenderness, guarding or rebound.   Genitourinary:     Labia:         Right: Rash present.         Left: Rash present.       Comments: Candidiasis on external genitalia  Musculoskeletal:      Left shoulder: Normal.      Cervical back: Neck supple. Normal range of motion.      Right lower leg: No edema.      Left lower leg: No edema.   Feet:      Right foot:      Protective Sensation: 10 sites tested.  9 sites sensed.      Skin integrity: Skin integrity normal. No erythema or warmth.      Toenail Condition: Right toenails are normal.      Left foot:      Protective Sensation: 10 sites tested.  4 sites sensed.      Skin integrity: Skin integrity normal. No erythema or warmth.      Toenail Condition: Left toenails are normal.      Comments: Diabetic Foot Exam Performed and Monofilament Test Performed  Lymphadenopathy:      Cervical: No cervical adenopathy.   Skin:     General: Skin is warm and dry.   Neurological:      Mental Status: She is alert and oriented to person, place, and time.   Psychiatric:         Mood and Affect: Mood normal.      Result Review :   The following data was reviewed by: MAGNUS Ross on 08/23/2023:               Office Visit on 08/23/2023   Component Date Value Ref Range Status    Hemoglobin A1C 08/23/2023 14  % Final    Greater Than    Lot Number 08/23/2023 NA   Final     Expiration Date 08/23/2023 NA   Final            Assessment and Plan    Diagnoses and all orders for this visit:    1. Type 2 diabetes mellitus with diabetic neuropathy, without long-term current use of insulin  Overview:  Hgba1c 14 % on 8/23/2023    Assessment & Plan:  Chronic problem.  Uncontrolled.  Start Lantus 10 units daily  Check blood sugar daily.  Bring blood sugar readings to next appointment.  Continue Jardiance 25 mg daily.    Orders:  -     POC Glycosylated Hemoglobin (Hb A1C)  -     Insulin Glargine (LANTUS SOLOSTAR) 100 UNIT/ML injection pen; Inject 10 Units under the skin into the appropriate area as directed Daily.  Dispense: 3 mL; Refill: 2  -     Blood Glucose Monitoring Suppl w/Device kit; 1 Device Daily. DX E11.9  Dispense: 1 each; Refill: 0  -     glucose blood test strip; Blood sugar twice daily diabetes E11.9  Dispense: 100 each; Refill: 12  -     Lancets misc; 1 container 2 (Two) Times a Day. DX E11.9  Dispense: 100 each; Refill: 2    2. Dyslipidemia  Assessment & Plan:  Continue atorvastatin 80 mg daily.  Check fasting lipid panel.      3. PVD (peripheral vascular disease)  Overview:  Per cardiology note - She had a CTA with run off. This documents CTA with borderline multifocal stenosis in the L common iliac artery, R > L with diffuse SFA disease, three vessel run off bilaterally, severe nolvia superficial varicosites the majority appeared to be arising from the bilateral greater saphenous veins, the right common femoral artery may represent dissection flap vs. Residual chronic thrombus. Dr. Menard spoke with Dr. Babcock. She is suppose to have surgery but Medicaid will not approve.        Assessment & Plan:  Refer to vascular surgery    Orders:  -     Ambulatory Referral to Vascular Surgery    4. Coronary artery disease involving native coronary artery of native heart without angina pectoris  Assessment & Plan:  Follow up with cardiology as scheduled.      5. Atrial flutter,  paroxysmal  Overview:  Per cardiology note - occurred post MI. Arrhythmia has not been documented in recurrence. She remains on oral anticoagulation due to elevated cardiac embolic risk score.      Assessment & Plan:  Chronic stable problem.   Continue Eliquis 5 mg BID.  Follow up with cardiology as scheduled.      6. Pneumococcal vaccine administered  -     Pneumococcal Conjugate Vaccine 20-Valent (PCV20)    7. Tobacco dependence due to cigarettes  Assessment & Plan:  She is not ready to quit.    Ordered CT low dose lung cancer screening    Orders:  -      CT Chest Low Dose Cancer Screening WO    8. Left foot pain  Comments:  Refer to podiatrist  Orders:  -     Ambulatory Referral to Podiatry    9. Vaginal candidiasis  Comments:  Begin Diflucan and nystatin cream  Orders:  -     nystatin (MYCOSTATIN) 106100 UNIT/GM cream; Apply 1 application  topically to the appropriate area as directed 2 (Two) Times a Day.  Dispense: 60 g; Refill: 1  -     fluconazole (Diflucan) 150 MG tablet; Take 1 tablet by mouth 1 (One) Time for 1 dose.  Dispense: 1 tablet; Refill: 0    10. Colon cancer screening  Comments:  [facility]             Follow Up   Return in about 4 weeks (around 9/20/2023) for Follow up on diabetes and for gyn exam/pap smear.  Patient was given instructions and counseling regarding her condition or for health maintenance advice. Please see specific information pulled into the AVS if appropriate.    There are no Patient Instructions on file for this visit.

## 2023-08-24 LAB
ALBUMIN SERPL-MCNC: 4 G/DL (ref 3.8–4.9)
ALBUMIN/GLOB SERPL: 2 {RATIO} (ref 1.2–2.2)
ALP SERPL-CCNC: 163 IU/L (ref 44–121)
ALT SERPL-CCNC: 57 IU/L (ref 0–32)
AST SERPL-CCNC: 55 IU/L (ref 0–40)
BASOPHILS # BLD AUTO: 0.1 X10E3/UL (ref 0–0.2)
BASOPHILS NFR BLD AUTO: 1 %
BILIRUB SERPL-MCNC: 0.3 MG/DL (ref 0–1.2)
BUN SERPL-MCNC: 12 MG/DL (ref 6–24)
BUN/CREAT SERPL: 18 (ref 9–23)
CALCIUM SERPL-MCNC: 9.1 MG/DL (ref 8.7–10.2)
CHLORIDE SERPL-SCNC: 97 MMOL/L (ref 96–106)
CHOLEST SERPL-MCNC: 128 MG/DL (ref 100–199)
CO2 SERPL-SCNC: 25 MMOL/L (ref 20–29)
CREAT SERPL-MCNC: 0.66 MG/DL (ref 0.57–1)
EGFRCR SERPLBLD CKD-EPI 2021: 102 ML/MIN/1.73
EOSINOPHIL # BLD AUTO: 0.1 X10E3/UL (ref 0–0.4)
EOSINOPHIL NFR BLD AUTO: 1 %
ERYTHROCYTE [DISTWIDTH] IN BLOOD BY AUTOMATED COUNT: 11.8 % (ref 11.7–15.4)
GLOBULIN SER CALC-MCNC: 2 G/DL (ref 1.5–4.5)
GLUCOSE SERPL-MCNC: 462 MG/DL (ref 70–99)
HCT VFR BLD AUTO: 48.3 % (ref 34–46.6)
HCV IGG SERPL QL IA: NON REACTIVE
HDLC SERPL-MCNC: 47 MG/DL
HGB BLD-MCNC: 15.6 G/DL (ref 11.1–15.9)
IMM GRANULOCYTES # BLD AUTO: 0 X10E3/UL (ref 0–0.1)
IMM GRANULOCYTES NFR BLD AUTO: 0 %
LDLC SERPL CALC-MCNC: 55 MG/DL (ref 0–99)
LYMPHOCYTES # BLD AUTO: 3.1 X10E3/UL (ref 0.7–3.1)
LYMPHOCYTES NFR BLD AUTO: 30 %
MCH RBC QN AUTO: 28 PG (ref 26.6–33)
MCHC RBC AUTO-ENTMCNC: 32.3 G/DL (ref 31.5–35.7)
MCV RBC AUTO: 87 FL (ref 79–97)
MONOCYTES # BLD AUTO: 0.5 X10E3/UL (ref 0.1–0.9)
MONOCYTES NFR BLD AUTO: 5 %
NEUTROPHILS # BLD AUTO: 6.5 X10E3/UL (ref 1.4–7)
NEUTROPHILS NFR BLD AUTO: 63 %
PLATELET # BLD AUTO: 183 X10E3/UL (ref 150–450)
POTASSIUM SERPL-SCNC: 4.5 MMOL/L (ref 3.5–5.2)
PROT SERPL-MCNC: 6 G/DL (ref 6–8.5)
RBC # BLD AUTO: 5.58 X10E6/UL (ref 3.77–5.28)
SODIUM SERPL-SCNC: 135 MMOL/L (ref 134–144)
TRIGL SERPL-MCNC: 153 MG/DL (ref 0–149)
TSH SERPL DL<=0.005 MIU/L-ACNC: 0.83 UIU/ML (ref 0.45–4.5)
VLDLC SERPL CALC-MCNC: 26 MG/DL (ref 5–40)
WBC # BLD AUTO: 10.4 X10E3/UL (ref 3.4–10.8)

## 2023-08-27 PROBLEM — F17.210 TOBACCO DEPENDENCE DUE TO CIGARETTES: Status: ACTIVE | Noted: 2023-08-27

## 2023-08-28 NOTE — ASSESSMENT & PLAN NOTE
Chronic problem.  Uncontrolled.  Start Lantus 10 units daily  Check blood sugar daily.  Bring blood sugar readings to next appointment.  Continue Jardiance 25 mg daily.

## 2023-09-13 ENCOUNTER — TELEPHONE (OUTPATIENT)
Dept: FAMILY MEDICINE CLINIC | Facility: CLINIC | Age: 58
End: 2023-09-13
Payer: MEDICAID

## 2023-09-13 NOTE — TELEPHONE ENCOUNTER
Caller: LONG    Relationship:     Best call back number: [phone]     What is the best time to reach you: ANY    Who are you requesting to speak with (clinical staff, provider,  specific staff member): CLINICAL STAFF    Do you know the name of the person who called:      What was the call regarding: LONG WITH [facility] CALLING ABOUT THE MEDICATION apixaban (ELIQUIS) 5 MG tablet tablet  AND clopidogrel (PLAVIX) 75 MG tablet  TAKEN TOGETHER AND JUST WANTED TO VERIFY IF PATIENT SHOULD BE ON BOTH OF THESE MEDICATIONS.  PLEASE ADVISE.  PLEASE CALL HER BACK.    Is it okay if the provider responds through MyChart:

## 2023-09-22 ENCOUNTER — OFFICE VISIT (OUTPATIENT)
Dept: FAMILY MEDICINE CLINIC | Facility: CLINIC | Age: 58
End: 2023-09-22
Payer: MEDICAID

## 2023-09-22 VITALS
DIASTOLIC BLOOD PRESSURE: 69 MMHG | RESPIRATION RATE: 18 BRPM | TEMPERATURE: 98.1 F | BODY MASS INDEX: 26.96 KG/M2 | SYSTOLIC BLOOD PRESSURE: 101 MMHG | OXYGEN SATURATION: 96 % | HEIGHT: 65 IN | HEART RATE: 80 BPM | WEIGHT: 161.8 LBS

## 2023-09-22 DIAGNOSIS — G89.29 CHRONIC LEFT SHOULDER PAIN: ICD-10-CM

## 2023-09-22 DIAGNOSIS — R29.6 FREQUENT FALLS: ICD-10-CM

## 2023-09-22 DIAGNOSIS — M25.512 CHRONIC LEFT SHOULDER PAIN: ICD-10-CM

## 2023-09-22 DIAGNOSIS — I25.10 CORONARY ARTERY DISEASE INVOLVING NATIVE CORONARY ARTERY OF NATIVE HEART WITHOUT ANGINA PECTORIS: ICD-10-CM

## 2023-09-22 DIAGNOSIS — B37.31 VAGINAL CANDIDIASIS: ICD-10-CM

## 2023-09-22 DIAGNOSIS — I48.92 ATRIAL FLUTTER, PAROXYSMAL: ICD-10-CM

## 2023-09-22 DIAGNOSIS — Z79.4 TYPE 2 DIABETES MELLITUS WITH DIABETIC NEUROPATHY, WITH LONG-TERM CURRENT USE OF INSULIN: Primary | ICD-10-CM

## 2023-09-22 DIAGNOSIS — E11.40 TYPE 2 DIABETES MELLITUS WITH DIABETIC NEUROPATHY, WITH LONG-TERM CURRENT USE OF INSULIN: Primary | ICD-10-CM

## 2023-09-22 DIAGNOSIS — I73.9 PVD (PERIPHERAL VASCULAR DISEASE): ICD-10-CM

## 2023-09-22 DIAGNOSIS — E78.5 DYSLIPIDEMIA: ICD-10-CM

## 2023-09-22 DIAGNOSIS — F17.210 TOBACCO DEPENDENCE DUE TO CIGARETTES: ICD-10-CM

## 2023-09-22 PROCEDURE — 3074F SYST BP LT 130 MM HG: CPT | Performed by: NURSE PRACTITIONER

## 2023-09-22 PROCEDURE — 3078F DIAST BP <80 MM HG: CPT | Performed by: NURSE PRACTITIONER

## 2023-09-22 PROCEDURE — 99214 OFFICE O/P EST MOD 30 MIN: CPT | Performed by: NURSE PRACTITIONER

## 2023-09-22 PROCEDURE — 3046F HEMOGLOBIN A1C LEVEL >9.0%: CPT | Performed by: NURSE PRACTITIONER

## 2023-09-22 PROCEDURE — 1159F MED LIST DOCD IN RCRD: CPT | Performed by: NURSE PRACTITIONER

## 2023-09-22 PROCEDURE — 1160F RVW MEDS BY RX/DR IN RCRD: CPT | Performed by: NURSE PRACTITIONER

## 2023-09-22 RX ORDER — FLUCONAZOLE 100 MG/1
100 TABLET ORAL DAILY
Qty: 7 TABLET | Refills: 0 | Status: SHIPPED | OUTPATIENT
Start: 2023-09-22 | End: 2023-09-29

## 2023-09-22 NOTE — PROGRESS NOTES
Chief Complaint  Diabetes, Hyperlipidemia, Peripheral Vascular Disease, and Coronary Artery Disease    Yolande Millan is a 58 y.o. female  who presents to [facility]     Patient Care Team:  Melania Damico APRN as PCP - General (Family Medicine)  Darian Babcock MD as Cardiologist (Interventional Cardiology)     History of Present Illness  Patient presents for follow-up of diabetes  This is an established problem  Interim treatment changes: Started Lantus 20 units daily  Current medications: Jardiance 25 mg daily, Lantus 20 units daily  Checking blood sugar at home: BID.  Fasting in AM and again about 5 pm  Fasting sugars 230 -300  About 5 pm it is over 300  Hgba1c 14 % on 8/23/2023  She did not bring glucose readings.     Patient presents for follow-up of hyperlipidemia  This is an established problem  Interim treatment changes: none  Current medications: atorvastatin 80 mg daily  Lipid panel 8/23/2023    Patient presents for follow-up of CAD and PVD  This is an established problem  Interim treatment changes:  Current medications:  She was referred to vascular surgeon.  She has not been scheduled yet.    She wants to have home health  States she has dizzy spells and frequent falling.  Feels weak    Yana Araya  has a past medical history of Anxiety, CAD (coronary artery disease), Depression, Diabetes, Diabetic retinopathy (08/22/2023), Dyslipidemia, Fibromyalgia, History of inferior wall myocardial infarction, Hyperlipidemia, MI (myocardial infarction), Orthostatic hypotension, Paroxysmal atrial flutter, Peripheral vascular disease, and Sleep apnea.      Review of Systems   Respiratory:  Positive for cough. Negative for shortness of breath.    Cardiovascular:  Negative for chest pain and leg swelling.   Musculoskeletal:  Positive for back pain.   Allergic/Immunologic: Positive for environmental allergies.   Neurological:  Positive for dizziness.       Family History   Problem Relation Age of Onset    Diabetes Mother     No Known Problems Father         Past Surgical History:   Procedure Laterality Date    COLONOSCOPY  04/25/2014    polyps; Dr. Welsh    CORONARY STENT PLACEMENT  06/09/2021    FOOT SURGERY Left 06/28/2022    Left first MTP J arthroplasty without implant/Ochoa arthroplasty; Plantar left great toe ulceration debridement with skin graft application; Dr. Tyree Velasco at [facility]    HYSTERECTOMY  04/23/2002    Dr. Casillas; due to abnormal cervical cells and heavy bleeding    SALIVARY GLAND EXCISION      TONSILLECTOMY          Social History     Socioeconomic History    Marital status:     Number of children: 2    Highest education level: 11th grade   Tobacco Use    Smoking status: Every Day     Packs/day: 1.00     Years: 39.00     Pack years: 39.00     Types: Cigarettes     Start date: 1984    Smokeless tobacco: Never   Vaping Use    Vaping Use: Never used   Substance and Sexual Activity    Alcohol use: Never    Drug use: Never    Sexual activity: Yes     Partners: Male     Birth control/protection: Hysterectomy        Immunization History   Administered Date(s) Administered    Pneumococcal Conjugate 20-Valent (PCV20) 08/23/2023    Tdap 03/02/2023       Objective       Current Outpatient Medications:     acetaminophen (TYLENOL) 500 MG tablet, Take 2 tablets by mouth Every 6 (Six) Hours As Needed for Mild Pain., Disp: , Rfl:     albuterol sulfate  (90 Base) MCG/ACT inhaler, 1 to 2 puffs inhalation every 4-6 hours as needed for cough and shortness of breath (Patient taking differently: Inhale 2 puffs Every 4 (Four) Hours As Needed. 1 to 2 puffs inhalation every 4-6 hours as needed for cough and shortness of breath), Disp: 18 g, Rfl: 1    apixaban (ELIQUIS) 5 MG tablet tablet, Take 1 tablet by mouth Every 12 (Twelve) Hours., Disp: , Rfl:     atorvastatin (LIPITOR) 80 MG tablet, Take 1 tablet by mouth Daily., Disp: , Rfl:     " Blood Glucose Monitoring Suppl w/Device kit, 1 Device Daily. DX E11.9, Disp: 1 each, Rfl: 0    clopidogrel (PLAVIX) 75 MG tablet, Take 1 tablet by mouth Daily., Disp: , Rfl:     empagliflozin (JARDIANCE) 25 MG tablet tablet, Take 1 tablet by mouth Daily., Disp: , Rfl:     glucose blood test strip, Blood sugar twice daily diabetes E11.9, Disp: 100 each, Rfl: 12    Insulin Glargine (LANTUS SOLOSTAR) 100 UNIT/ML injection pen, Inject 30 Units under the skin into the appropriate area as directed Daily., Disp: 9 mL, Rfl: 2    Lancets misc, 1 container 2 (Two) Times a Day. DX E11.9, Disp: 100 each, Rfl: 2    nystatin (MYCOSTATIN) 300668 UNIT/GM cream, Apply 1 application  topically to the appropriate area as directed 2 (Two) Times a Day., Disp: 60 g, Rfl: 1    PARoxetine (PAXIL) 40 MG tablet, Take 1 tablet by mouth Daily., Disp: , Rfl:     fluconazole (Diflucan) 100 MG tablet, Take 1 tablet by mouth Daily for 7 days., Disp: 7 tablet, Rfl: 0    metFORMIN (GLUCOPHAGE) 500 MG tablet, Take 1 tablet by mouth 2 (Two) Times a Day With Meals., Disp: 180 tablet, Rfl: 1    Vital Signs:      /69   Pulse 80   Temp 98.1 °F (36.7 °C) (Infrared)   Resp 18   Ht 165.1 cm (65\")   Wt 73.4 kg (161 lb 12.8 oz)   SpO2 96%   BMI 26.92 kg/m²     Vitals:    09/22/23 0757   BP: 101/69   Pulse: 80   Resp: 18   Temp: 98.1 °F (36.7 °C)   TempSrc: Infrared   SpO2: 96%   Weight: 73.4 kg (161 lb 12.8 oz)   Height: 165.1 cm (65\")      Physical Exam  Vitals reviewed.   Constitutional:       General: She is not in acute distress.     Appearance: Normal appearance. She is not diaphoretic.   HENT:      Head: Normocephalic and atraumatic.      Right Ear: Tympanic membrane, ear canal and external ear normal.      Left Ear: Tympanic membrane, ear canal and external ear normal.      Mouth/Throat:      Mouth: Mucous membranes are moist.      Pharynx: Oropharynx is clear.   Eyes:      General: No scleral icterus.     Conjunctiva/sclera: Conjunctivae " normal.   Cardiovascular:      Rate and Rhythm: Normal rate and regular rhythm.      Heart sounds: Normal heart sounds.   Pulmonary:      Effort: Pulmonary effort is normal. No respiratory distress.      Breath sounds: Normal breath sounds. No wheezing.   Musculoskeletal:      Left shoulder: Normal.      Cervical back: Neck supple.      Right lower leg: No edema.      Left lower leg: No edema.   Lymphadenopathy:      Cervical: No cervical adenopathy.   Skin:     General: Skin is warm and dry.   Neurological:      Mental Status: She is alert and oriented to person, place, and time.   Psychiatric:         Mood and Affect: Mood normal.      Result Review :   The following data was reviewed by: MAGNUS Ross on 09/22/2023:           Office Visit on 08/23/2023   Component Date Value Ref Range Status    Hemoglobin A1C 08/23/2023 14  % Final    Greater Than    Lot Number 08/23/2023 NA   Final    Expiration Date 08/23/2023 NA   Final   Office Visit on 03/02/2023   Component Date Value Ref Range Status    Creatinine, Urine 03/02/2023 34.6  Not Estab. mg/dL Final    Microalbumin, Urine 03/02/2023 4.6  Not Estab. ug/mL Final    Microalbumin/Creatinine Ratio 03/02/2023 13  0 - 29 mg/g creat Final    Comment:                        Normal:                0 -  29                         Moderately increased: 30 - 300                         Severely increased:       >300      Color 03/02/2023 Yellow  Yellow, Straw, Dark Yellow, Monica Final    Clarity, UA 03/02/2023 Clear  Clear Final    Specific Gravity  03/02/2023 1.015  1.005 - 1.030 Final    pH, Urine 03/02/2023 6.0  5.0 - 8.0 Final    Leukocytes 03/02/2023 Negative  Negative Final    Nitrite, UA 03/02/2023 Negative  Negative Final    Protein, POC 03/02/2023 Negative  Negative mg/dL Final    Glucose, UA 03/02/2023 Negative  Negative mg/dL Final    Ketones, UA 03/02/2023 Negative  Negative Final    Urobilinogen, UA 03/02/2023 Normal  Normal, 0.2 E.U./dL Final     Bilirubin 03/02/2023 Negative  Negative Final    Blood, UA 03/02/2023 Negative  Negative Final    Lot Number 03/02/2023 na   Final    Expiration Date 03/02/2023 na   Final    WBC 08/23/2023 10.4  3.4 - 10.8 x10E3/uL Final    RBC 08/23/2023 5.58 (H)  3.77 - 5.28 x10E6/uL Final    Hemoglobin 08/23/2023 15.6  11.1 - 15.9 g/dL Final    Hematocrit 08/23/2023 48.3 (H)  34.0 - 46.6 % Final    MCV 08/23/2023 87  79 - 97 fL Final    MCH 08/23/2023 28.0  26.6 - 33.0 pg Final    MCHC 08/23/2023 32.3  31.5 - 35.7 g/dL Final    RDW 08/23/2023 11.8  11.7 - 15.4 % Final    Platelets 08/23/2023 183  150 - 450 x10E3/uL Final    Neutrophil Rel % 08/23/2023 63  Not Estab. % Final    Lymphocyte Rel % 08/23/2023 30  Not Estab. % Final    Monocyte Rel % 08/23/2023 5  Not Estab. % Final    Eosinophil Rel % 08/23/2023 1  Not Estab. % Final    Basophil Rel % 08/23/2023 1  Not Estab. % Final    Neutrophils Absolute 08/23/2023 6.5  1.4 - 7.0 x10E3/uL Final    Lymphocytes Absolute 08/23/2023 3.1  0.7 - 3.1 x10E3/uL Final    Monocytes Absolute 08/23/2023 0.5  0.1 - 0.9 x10E3/uL Final    Eosinophils Absolute 08/23/2023 0.1  0.0 - 0.4 x10E3/uL Final    Basophils Absolute 08/23/2023 0.1  0.0 - 0.2 x10E3/uL Final    Immature Granulocyte Rel % 08/23/2023 0  Not Estab. % Final    Immature Grans Absolute 08/23/2023 0.0  0.0 - 0.1 x10E3/uL Final    Glucose 08/23/2023 462 (H)  70 - 99 mg/dL Final    BUN 08/23/2023 12  6 - 24 mg/dL Final    Creatinine 08/23/2023 0.66  0.57 - 1.00 mg/dL Final    EGFR Result 08/23/2023 102  >59 mL/min/1.73 Final    BUN/Creatinine Ratio 08/23/2023 18  9 - 23 Final    Sodium 08/23/2023 135  134 - 144 mmol/L Final    Potassium 08/23/2023 4.5  3.5 - 5.2 mmol/L Final    Chloride 08/23/2023 97  96 - 106 mmol/L Final    Total CO2 08/23/2023 25  20 - 29 mmol/L Final    Calcium 08/23/2023 9.1  8.7 - 10.2 mg/dL Final    Total Protein 08/23/2023 6.0  6.0 - 8.5 g/dL Final    Albumin 08/23/2023 4.0  3.8 - 4.9 g/dL Final    Globulin  08/23/2023 2.0  1.5 - 4.5 g/dL Final    A/G Ratio 08/23/2023 2.0  1.2 - 2.2 Final    Total Bilirubin 08/23/2023 0.3  0.0 - 1.2 mg/dL Final    Alkaline Phosphatase 08/23/2023 163 (H)  44 - 121 IU/L Final    AST (SGOT) 08/23/2023 55 (H)  0 - 40 IU/L Final    ALT (SGPT) 08/23/2023 57 (H)  0 - 32 IU/L Final    Hep C Virus Ab 08/23/2023 Non Reactive  Non Reactive Final    Comment: HCV antibody alone does not differentiate between previously  resolved infection and active infection. Equivocal and Reactive  HCV antibody results should be followed up with an HCV RNA test  to support the diagnosis of active HCV infection.      Total Cholesterol 08/23/2023 128  100 - 199 mg/dL Final    Triglycerides 08/23/2023 153 (H)  0 - 149 mg/dL Final    HDL Cholesterol 08/23/2023 47  >39 mg/dL Final    VLDL Cholesterol Facundo 08/23/2023 26  5 - 40 mg/dL Final    LDL Chol Calc (NIH) 08/23/2023 55  0 - 99 mg/dL Final    TSH 08/23/2023 0.834  0.450 - 4.500 uIU/mL Final          Assessment and Plan    Diagnoses and all orders for this visit:    1. Type 2 diabetes mellitus with diabetic neuropathy, with long-term current use of insulin (Primary)  Overview:  Hgba1c 14 % on 8/23/2023    Assessment & Plan:  Chronic problem.  Uncontrolled.  Increase Lantus to 30 units daily.  Begin Metformin 500 mg BID.  Check sugar fasting every morning and increase by 1 unit per day for fasting sugar over 100.  Bring blood sugar readings to next appointment.  Continue Jardiance 25 mg daily.    Orders:  -     metFORMIN (GLUCOPHAGE) 500 MG tablet; Take 1 tablet by mouth 2 (Two) Times a Day With Meals.  Dispense: 180 tablet; Refill: 1  -     Insulin Glargine (LANTUS SOLOSTAR) 100 UNIT/ML injection pen; Inject 30 Units under the skin into the appropriate area as directed Daily.  Dispense: 9 mL; Refill: 2  -     glucose blood test strip; Blood sugar twice daily diabetes E11.9  Dispense: 100 each; Refill: 12    2. Dyslipidemia  Assessment & Plan:  Chronic stable  problem.  Continue atorvastatin 80 mg daily.  Recheck fasting lipid panel in August 2024      3. PVD (peripheral vascular disease)  Overview:  Per cardiology note - She had a CTA with run off. This documents CTA with borderline multifocal stenosis in the L common iliac artery, R > L with diffuse SFA disease, three vessel run off bilaterally, severe nolvia superficial varicosites the majority appeared to be arising from the bilateral greater saphenous veins, the right common femoral artery may represent dissection flap vs. Residual chronic thrombus. Dr. Menard spoke with Dr. Babcock. She is suppose to have surgery but Medicaid will not approve.        Assessment & Plan:  Refer to vascular surgery.      Orders:  -     US Ankle / Brachial Indices Extremity Limited    4. Coronary artery disease involving native coronary artery of native heart without angina pectoris  Assessment & Plan:  Follow up with cardiology as scheduled.    Orders:  -     US Carotid Bilateral    5. Atrial flutter, paroxysmal  Overview:  Per cardiology note - occurred post MI. Arrhythmia has not been documented in recurrence. She remains on oral anticoagulation due to elevated cardiac embolic risk score.      Assessment & Plan:  Chronic stable problem.   Continue Eliquis 5 mg BID.  Follow up with cardiology as scheduled.      6. Tobacco dependence due to cigarettes  Assessment & Plan:  Recommended smoking cessation.  She is not ready to quit.    Ordered CT low dose lung cancer screening but she states it was denied by insurance.      7. Vaginal candidiasis  Comments:  She states medication has helped but rash still present. Begin Diflucan daily for 7 days  Orders:  -     fluconazole (Diflucan) 100 MG tablet; Take 1 tablet by mouth Daily for 7 days.  Dispense: 7 tablet; Refill: 0    8. Frequent falls  Comments:  Refer to PT for strengthening and evaluation  Orders:  -     Ambulatory Referral to Physical Therapy Evaluate and treat    9. Chronic left  shoulder pain  Assessment & Plan:  Refer to PT    Orders:  -     Ambulatory Referral to Physical Therapy Evaluate and treat             Follow Up   Return in about 4 weeks (around 10/20/2023) for follow up diabetes.  Patient was given instructions and counseling regarding her condition or for health maintenance advice. Please see specific information pulled into the AVS if appropriate.    Patient Instructions   The [facility] Scheduling Department will contact you to schedule ordered testing.  If you would like to schedule or verify your appointment (for mammogram, LISET and carotid ultrasound), you can call [facility] Scheduling at [phone].     Restart Metformin 500 mg twice daily.  Increase Lantus to 30 units daily.  Check sugar fasting every morning and increase by 1 unit per day for fasting sugar over 100.

## 2023-09-22 NOTE — PATIENT INSTRUCTIONS
The [facility] Scheduling Department will contact you to schedule ordered testing.  If you would like to schedule or verify your appointment (for mammogram, LISET and carotid ultrasound), you can call [facility] Scheduling at [phone].     Restart Metformin 500 mg twice daily.  Increase Lantus to 30 units daily.  Check sugar fasting every morning and increase by 1 unit per day for fasting sugar over 100.

## 2023-09-23 NOTE — ASSESSMENT & PLAN NOTE
Chronic problem.  Uncontrolled.  Increase Lantus to 30 units daily.  Begin Metformin 500 mg BID.  Check sugar fasting every morning and increase by 1 unit per day for fasting sugar over 100.  Bring blood sugar readings to next appointment.  Continue Jardiance 25 mg daily.

## 2023-09-23 NOTE — ASSESSMENT & PLAN NOTE
Chronic stable problem.  Continue atorvastatin 80 mg daily.  Recheck fasting lipid panel in August 2024

## 2023-09-23 NOTE — ASSESSMENT & PLAN NOTE
Recommended smoking cessation.  She is not ready to quit.    Ordered CT low dose lung cancer screening but she states it was denied by insurance.

## 2023-10-03 ENCOUNTER — TELEMEDICINE (OUTPATIENT)
Dept: FAMILY MEDICINE CLINIC | Facility: CLINIC | Age: 58
End: 2023-10-03
Payer: MEDICAID

## 2023-10-03 DIAGNOSIS — J06.9 UPPER RESPIRATORY TRACT INFECTION, UNSPECIFIED TYPE: ICD-10-CM

## 2023-10-03 DIAGNOSIS — Z91.09 ENVIRONMENTAL ALLERGIES: ICD-10-CM

## 2023-10-03 DIAGNOSIS — J01.40 ACUTE NON-RECURRENT PANSINUSITIS: Primary | ICD-10-CM

## 2023-10-03 PROCEDURE — 3046F HEMOGLOBIN A1C LEVEL >9.0%: CPT

## 2023-10-03 PROCEDURE — 99213 OFFICE O/P EST LOW 20 MIN: CPT

## 2023-10-03 RX ORDER — CEPHALEXIN 500 MG/1
500 CAPSULE ORAL EVERY 8 HOURS
Qty: 30 CAPSULE | Refills: 0 | Status: SHIPPED | OUTPATIENT
Start: 2023-10-03 | End: 2023-10-13

## 2023-10-03 RX ORDER — BROMPHENIRAMINE MALEATE, PSEUDOEPHEDRINE HYDROCHLORIDE, AND DEXTROMETHORPHAN HYDROBROMIDE 2; 30; 10 MG/5ML; MG/5ML; MG/5ML
10 SYRUP ORAL 4 TIMES DAILY PRN
Qty: 300 ML | Refills: 0 | Status: SHIPPED | OUTPATIENT
Start: 2023-10-03

## 2023-10-03 NOTE — PROGRESS NOTES
Office Note     Name: Yana Araya    : 1965     MRN: [id number]     Chief Complaint  No chief complaint on file.    Subjective     History of Present Illness:  Yana Araya is a 58 y.o. female who presents today via video visit encounter to discuss URI symptoms including   Cough - yellow green productive   Ear pressure bilaterally  Headache  Sinus pressure bilaterally, upper and lower  Runny nose.    She reports the symptoms have been occurring for about 9 days.  She denies any fever or chills diarrhea nausea vomiting or constipation.  She does report fatigue.  She has year-round allergies but does not take daily allergy medication.  She is a tobacco user and smokes 1 pack/day.  She has tried using a nasal menthol stick and has used her albuterol rescue inhaler about twice per day.  She does not have a maintenance inhaler.    She is not hypertensive and reports blood pressure is well controlled at home.  She is a diabetic and medication list has been reviewed.      I performed this visit using real-time telehealth tools per patient request, including a videoconferencing connection between my location and the patient's location. Prior to initiating the services, I obtained the patient's informed verbal consent on 10/03/23 and answered all the questions the patient had about the telehealth interaction.    Originating Site (patient's location): Home    Distant Site (provider's location): [address]  [facility]  [address] [place] [address] DR OZZY BAKER [address] [place] [id number] [phone]    History of Present Illness    Allergies   Allergen Reactions    Penicillins Unknown - High Severity    Sulfa Antibiotics Myalgia         Current Outpatient Medications:     acetaminophen (TYLENOL) 500 MG tablet, Take 2 tablets by mouth Every 6 (Six) Hours As Needed for Mild Pain., Disp: , Rfl:     albuterol sulfate  (90 Base) MCG/ACT inhaler, 1 to 2 puffs inhalation every 4-6  hours as needed for cough and shortness of breath (Patient taking differently: Inhale 2 puffs Every 4 (Four) Hours As Needed. 1 to 2 puffs inhalation every 4-6 hours as needed for cough and shortness of breath), Disp: 18 g, Rfl: 1    apixaban (ELIQUIS) 5 MG tablet tablet, Take 1 tablet by mouth Every 12 (Twelve) Hours., Disp: , Rfl:     atorvastatin (LIPITOR) 80 MG tablet, Take 1 tablet by mouth Daily., Disp: , Rfl:     Blood Glucose Monitoring Suppl w/Device kit, 1 Device Daily. DX E11.9, Disp: 1 each, Rfl: 0    clopidogrel (PLAVIX) 75 MG tablet, Take 1 tablet by mouth Daily., Disp: , Rfl:     empagliflozin (JARDIANCE) 25 MG tablet tablet, Take 1 tablet by mouth Daily., Disp: , Rfl:     glucose blood test strip, Blood sugar twice daily diabetes E11.9, Disp: 100 each, Rfl: 12    Insulin Glargine (LANTUS SOLOSTAR) 100 UNIT/ML injection pen, Inject 30 Units under the skin into the appropriate area as directed Daily., Disp: 9 mL, Rfl: 2    Lancets misc, 1 container 2 (Two) Times a Day. DX E11.9, Disp: 100 each, Rfl: 2    metFORMIN (GLUCOPHAGE) 500 MG tablet, Take 1 tablet by mouth 2 (Two) Times a Day With Meals., Disp: 180 tablet, Rfl: 1    nystatin (MYCOSTATIN) 178348 UNIT/GM cream, Apply 1 application  topically to the appropriate area as directed 2 (Two) Times a Day., Disp: 60 g, Rfl: 1    PARoxetine (PAXIL) 40 MG tablet, Take 1 tablet by mouth Daily., Disp: , Rfl:     Review of Systems   Constitutional:  Positive for fatigue. Negative for chills and fever.   HENT:  Positive for congestion, ear pain, postnasal drip, rhinorrhea, sinus pressure and sneezing. Negative for sore throat and swollen glands.    Gastrointestinal:  Negative for constipation, diarrhea, nausea and vomiting.   Allergic/Immunologic: Positive for environmental allergies.   Neurological:  Positive for headache.     Social History     Socioeconomic History    Marital status:     Number of children: 2    Highest education level: 11th grade    Tobacco Use    Smoking status: Every Day     Packs/day: 1.00     Years: 39.00     Pack years: 39.00     Types: Cigarettes     Start date: 1984    Smokeless tobacco: Never   Vaping Use    Vaping Use: Never used   Substance and Sexual Activity    Alcohol use: Never    Drug use: Never    Sexual activity: Yes     Partners: Male     Birth control/protection: Hysterectomy       Family History   Problem Relation Age of Onset    Diabetes Mother     No Known Problems Father            3/2/2023     8:59 AM   PHQ-2/PHQ-9 Depression Screening   Little Interest or Pleasure in Doing Things 1-->several days   Feeling Down, Depressed or Hopeless 1-->several days   Trouble Falling or Staying Asleep, or Sleeping Too Much 3-->nearly every day   Feeling Tired or Having Little Energy 3-->nearly every day   Feeling Bad about Yourself - or that You are a Failure or Have Let Yourself or Your Family Down 0-->not at all   Trouble Concentrating on Things, Such as Reading the Newspaper or Watching Television 0-->not at all   Moving or Speaking So Slowly that Other People Could Have Noticed? Or the Opposite - Being So Fidgety 0-->not at all   Thoughts that You Would be Better Off Dead or of Hurting Yourself in Some Way 0-->not at all   PHQ-9: Brief Depression Severity Measure Score 8   If You Checked Off Any Problems, How Difficult Have These Problems Made It For You to Do Your Work, Take Care of Things at Home, or Get Along with Other People? somewhat difficult       Fall Risk Screen:  SAMUELADI Fall Risk Assessment has not been completed.      Objective     There were no vitals taken for this visit.    BP Readings from Last 2 Encounters:   09/22/23 101/69   08/23/23 110/70       Wt Readings from Last 2 Encounters:   09/22/23 73.4 kg (161 lb 12.8 oz)   08/23/23 73.8 kg (162 lb 12.8 oz)                Physical Exam  Vitals reviewed: Limited chart review due to nature of video visit..   Constitutional:       General: She is not in acute  distress.     Appearance: She is not ill-appearing, toxic-appearing or diaphoretic.   Neurological:      Mental Status: She is alert and oriented to person, place, and time. Mental status is at baseline.   Psychiatric:         Mood and Affect: Mood normal.         Behavior: Behavior normal. Behavior is cooperative.     Derm Physical Exam  Result Review :                 Assessment and Plan     Procedures  Plan    There are no diagnoses linked to this encounter.     Problem List Items Addressed This Visit    None    Time spent on evaluation, management, counseling, patient education, and coordination of care:  17 minutes, over half of which was spent in counseling and coordination  of care.  Follow Up   Wrapup Tab  No follow-ups on file.     Patient was given instructions and counseling regarding her condition or for health maintenance advice. Please see specific information pulled into the AVS if appropriate.  Hand hygiene was performed during entrance to exam room and following assessment of patient. This document is intended for medical expert use only.     EMR Dragon/Transcription disclaimer:   Much of this encounter note is an electronic transcription/translation of spoken language to printed text. The electronic translation of spoken language may permit erroneous, or at times, nonsensical words or phrases to be inadvertently transcribed.      MAGNUS Henriquez, FNP-C  [address]  [facility]  [address] [place] [address] [address]Y [address]  [facility] [id number] [phone]

## 2023-10-05 ENCOUNTER — TELEPHONE (OUTPATIENT)
Dept: FAMILY MEDICINE CLINIC | Facility: CLINIC | Age: 58
End: 2023-10-05
Payer: MEDICAID

## 2023-10-05 NOTE — TELEPHONE ENCOUNTER
Caller: Yana Araya    Relationship: Self    Best call back number: [phone]       What was the call regarding: PATIENT INQUIRED ON PAPERWORK FOR HOME HEALTH CARE.  PATIENT STATED THAT [facility] IS NOT GETTING THE RIGHT PAPERWORK RETURNED TO THEM.  THEY NEED PAPERWORK THAT GIVES THEM THE HOURS THEY CAN PROVIDE THE PATIENT ON A WEEKLY BASIS.  PLEASE ADVISE    Is it okay if the provider responds through MyChart:         DELETE AFTER READING TO PATIENT: “ Thank you for sharing this information with me. I will send a message to the . Please allow up to 48 hours for the  to follow up on this request.”

## 2023-10-06 ENCOUNTER — TELEPHONE (OUTPATIENT)
Dept: FAMILY MEDICINE CLINIC | Facility: CLINIC | Age: 58
End: 2023-10-06
Payer: MEDICAID

## 2023-10-06 NOTE — TELEPHONE ENCOUNTER
I signed a paper that they could go evaluate her.    The paper I have now is a face to face and the information on that will also need to be documented in her chart.

## 2023-10-06 NOTE — TELEPHONE ENCOUNTER
Called [facility].  Talked to the  in office.  States pt made self referral stating she is needing help with personal care in her home.  They are asking for a signature stating that a nurse can go and evaluate pt.  States that since they are medicaid based they have different guidelines than what other home health agencies have with traditional insurances.

## 2023-11-02 ENCOUNTER — TELEPHONE (OUTPATIENT)
Dept: FAMILY MEDICINE CLINIC | Facility: CLINIC | Age: 58
End: 2023-11-02

## 2023-11-02 NOTE — TELEPHONE ENCOUNTER
She cancelled her follow up appointment.  Her diabetes is not under control and she needs to reschedule appointment.

## 2023-11-06 NOTE — TELEPHONE ENCOUNTER
Caller: Marshal Yana    Relationship: Self    Best call back number: [phone]     Requested Prescriptions:   Requested Prescriptions     Pending Prescriptions Disp Refills    apixaban (ELIQUIS) 5 MG tablet tablet 60 tablet      Sig: Take 1 tablet by mouth Every 12 (Twelve) Hours.        Pharmacy where request should be sent: [facility] [address] [place] [address] [id number] - [phone]  - [phone]     Last office visit with prescribing clinician: 9/22/2023   Last telemedicine visit with prescribing clinician: 10/3/2023   Next office visit with prescribing clinician: 11/16/2023     Additional details provided by patient: PATIENT IS OUT OF THIS MEDICATION AND IS NEEDING A NEW PRESCRIPTION SENT TO HER PHARMACY UNDER KIMI AGUDELO NAME. SHE IS REQUESTING ADDITIONAL REFILLS BE SENT ALONG WITH THIS REQUEST.     PLEASE CALL TO ADVISE.     Does the patient have less than a 3 day supply:  [x] Yes  [] No    Would you like a call back once the refill request has been completed: [] Yes [x] No    If the office needs to give you a call back, can they leave a voicemail: [x] Yes [] No    Rajeev Ruano Rep   11/06/23 14:32 EST

## 2023-11-08 ENCOUNTER — OFFICE VISIT (OUTPATIENT)
Dept: PODIATRY | Facility: CLINIC | Age: 58
End: 2023-11-08
Payer: MEDICAID

## 2023-11-08 VITALS — BODY MASS INDEX: 26.82 KG/M2 | RESPIRATION RATE: 20 BRPM | HEIGHT: 65 IN | WEIGHT: 161 LBS

## 2023-11-08 DIAGNOSIS — M79.672 BILATERAL FOOT PAIN: Primary | ICD-10-CM

## 2023-11-08 DIAGNOSIS — L85.3 XEROSIS OF SKIN: ICD-10-CM

## 2023-11-08 DIAGNOSIS — M79.671 BILATERAL FOOT PAIN: Primary | ICD-10-CM

## 2023-11-08 DIAGNOSIS — M20.5X2 MALLET TOE, LEFT: ICD-10-CM

## 2023-11-08 DIAGNOSIS — E11.65 UNCONTROLLED TYPE 2 DIABETES MELLITUS WITH HYPERGLYCEMIA: ICD-10-CM

## 2023-11-08 DIAGNOSIS — E11.42 TYPE 2 DIABETES MELLITUS WITH PERIPHERAL NEUROPATHY: ICD-10-CM

## 2023-11-08 RX ORDER — MOXIFLOXACIN 5 MG/ML
SOLUTION/ DROPS OPHTHALMIC
COMMUNITY
Start: 2023-10-29

## 2023-11-08 RX ORDER — PREDNISOLONE ACETATE 10 MG/ML
SUSPENSION/ DROPS OPHTHALMIC
COMMUNITY
Start: 2023-10-31

## 2023-11-08 RX ORDER — KETOROLAC TROMETHAMINE 5 MG/ML
SOLUTION OPHTHALMIC
COMMUNITY
Start: 2023-10-31

## 2023-11-08 NOTE — PROGRESS NOTES
11/08/2023  Foot and Ankle Surgery - New Patient   Provider: Dr. Dennis Soto DPM  Location: [facility]    Subjective:  Yana Araya is a 58 y.o. female.     Chief Complaint   Patient presents with    Left Foot - Pain    Establish [facility]     SUDARSHAN Damico APRN 9/22/2023       HPI: The patient is a 58-year-old female who presents to the clinic for left foot pain. She has had previous surgery on her left foot.     She reports she has had a previous open wound and infection on her left foot. She states Dr. Tyree Velasco removed a bone from her left foot, which made it shorter. She notes she is unable to fully extend her left foot. The patient reports she is afraid it will end up splitting and she is going to get another infection. She states she is diabetic and her last A1c was 13.5 percent in 01/2022. She reports her blood glucose levels range in the 300s. She notes she goes to her primary care physician every 3 months. The patient reports she has numbness sensation in her feet. She states her right foot has been swollen for the last couple of days. She notes she is unsure if it is related to when she fell. The patient reports she has had sepsis twice from her left great toe. She states she is a smoker. The patient notes she did not have these issues until she had her surgery with Dr. Tyree Velasco.     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 Welsh    CORONARY STENT PLACEMENT  06/09/2021    FOOT SURGERY Left 06/28/2022    Left first MTP J arthroplasty without implant/Ochoa arthroplasty; Plantar left great toe ulceration debridement with skin graft application; Dr[facility]ree [facility] at [facility]    HYSTERECTOMY  04/23/2002    Dr. Casillas; due to abnormal cervical cells and heavy bleeding    SALIVARY GLAND EXCISION      TONSILLECTOMY         Family History   Problem Relation Age of Onset    Diabetes Mother     Cancer Mother     No Known Problems Father     Heart disease Sister        Social History     Socioeconomic History    Marital status:     Number of children: 2    Highest education level: 11th grade   Tobacco Use    Smoking status: Every Day     Packs/day: 1.00     Years: 15.00     Additional pack years: 0.00     Total pack years: 15.00     Types: Cigarettes     Start date: 1984    Smokeless tobacco: Never   Vaping Use    Vaping Use: Never used   Substance and Sexual Activity    Alcohol use: Never    Drug use: Never    Sexual activity: Not Currently     Partners: Male     Birth control/protection: None, Hysterectomy        Current Outpatient Medications on File Prior to Visit   Medication Sig Dispense Refill    acetaminophen (TYLENOL) 500 MG tablet Take 2 tablets by mouth Every 6 (Six) Hours As Needed for Mild Pain.      albuterol sulfate  (90 Base) MCG/ACT inhaler 1 to 2 puffs inhalation every 4-6 hours as needed for cough and shortness of breath (Patient taking differently: Inhale 2 puffs Every 4 (Four) Hours As Needed. 1 to 2 puffs inhalation every 4-6 hours as needed for cough and shortness of breath) 18 g 1    apixaban (ELIQUIS) 5 MG tablet tablet Take 1 tablet by mouth Every 12 (Twelve) Hours. 60 tablet 3    atorvastatin (LIPITOR) 80 MG tablet Take 1 tablet by mouth  Daily.      Blood Glucose Monitoring Suppl w/Device kit 1 Device Daily. DX E11.9 1 each 0    clopidogrel (PLAVIX) 75 MG tablet Take 1 tablet by mouth Daily.      empagliflozin (JARDIANCE) 25 MG tablet tablet Take 1 tablet by mouth Daily.      glucose blood test strip Blood sugar twice daily diabetes E11.9 100 each 12    Insulin Glargine (LANTUS SOLOSTAR) 100 UNIT/ML injection pen Inject 30 Units under the skin into the appropriate area as directed Daily. 9 mL 2    ketorolac (ACULAR) 0.5 % ophthalmic solution       Lancets misc 1 container 2 (Two) Times a Day. DX E11.9 100 each 2    moxifloxacin (VIGAMOX) 0.5 % ophthalmic solution       nystatin (MYCOSTATIN) 365685 UNIT/GM cream Apply 1 application  topically to the appropriate area as directed 2 (Two) Times a Day. 60 g 1    PARoxetine (PAXIL) 40 MG tablet Take 1 tablet by mouth Daily.      Pred Forte 1 % ophthalmic suspension       brompheniramine-pseudoephedrine-DM 30-2-10 MG/5ML syrup Take 10 mL by mouth 4 (Four) Times a Day As Needed for Congestion or Cough. (Patient not taking: Reported on 11/8/2023) 300 mL 0    metFORMIN (GLUCOPHAGE) 500 MG tablet Take 1 tablet by mouth 2 (Two) Times a Day With Meals. (Patient not taking: Reported on 11/8/2023) 180 tablet 1     No current facility-administered medications on file prior to visit.       Review of Systems:  General: Denies fever, chills, fatigue, and weakness.  Eyes: Denies vision loss, blurry vision, and excessive redness.  ENT: Denies hearing issues and difficulty swallowing.  Cardiovascular: Denies palpitations, chest pain, or syncopal episodes.  Respiratory: Denies shortness of breath, wheezing, and coughing.  GI: Denies abdominal pain, nausea, and vomiting.   : Denies frequency, hematuria, and urgency.  Musculoskeletal: Denies muscle cramps, joint pains, and stiffness.  Derm: Denies rash, open wounds, or suspicious lesions.  Neuro: Denies headaches, numbness, loss of coordination, and tremors.  Psych:  "Denies anxiety and depression.  Endocrine: Denies temperature intolerance and changes in appetite.  Heme: Denies bleeding disorders or abnormal bruising.     Objective   Resp 20   Ht 165.1 cm (65\")   Wt 73 kg (161 lb)   BMI 26.79 kg/m²     Foot/Ankle Exam    GENERAL  Orientation:  AAOx3  Affect:  appropriate    VASCULAR     Right Foot Vascularity   Normal vascular exam    Dorsalis pedis:  2+  Posterior tibial:  2+  Skin temperature:  warm  Edema grading:  None  CFT:  < 3 seconds  Pedal hair growth:  Present  Varicosities:  none     Left Foot Vascularity   Normal vascular exam    Dorsalis pedis:  2+  Posterior tibial:  2+  Skin temperature:  warm  Edema grading:  None  CFT:  < 3 seconds  Pedal hair growth:  Present  Varicosities:  none     NEUROLOGIC     Right Foot Neurologic   Light touch sensation: normal  Hot/Cold sensation: normal  Achilles reflex:  2+     Left Foot Neurologic   Light touch sensation: normal  Hot/Cold sensation:  normal  Achilles reflex:  2+    MUSCULOSKELETAL     Right Foot Musculoskeletal   Arch:  Normal     Left Foot Musculoskeletal   Arch:  Normal    MUSCLE STRENGTH     Right Foot Muscle Strength   Normal strength    Foot dorsiflexion:  5  Foot plantar flexion:  5  Foot inversion:  5  Foot eversion:  5     Left Foot Muscle Strength   Normal strength    Foot dorsiflexion:  5  Foot plantar flexion:  5  Foot inversion:  5  Foot eversion:  5    DERMATOLOGIC      Right Foot Dermatologic   Skin  Right foot skin is intact.   Nails comment:  Nails 1-5     Left Foot Dermatologic   Skin  Left foot skin is intact.   Nails comment:  Nails 1-5    TESTS     Right Foot Tests   Anterior drawer: negative  Varus tilt: negative     Left Foot Tests   Anterior drawer: negative  Varus tilt: negative     Left foot additional comments: Significant loss of protective sensation with monofilament testing and light touch to both feet. Mild swelling involving the right foot. Mallet toe deformity involving the left " "great toe. Moderate xerosis without any open wound or signs of infection. Vascular status appears appropriate with palpable DP and PT pulses to both feet. Moderate equinus contracture with knee extended and flexed.      Assessment & Plan   Diagnoses and all orders for this visit:    1. Bilateral foot pain (Primary)  -     XR Foot 3+ View Right    2. Mallet toe, left  -     XR Foot 3+ View Left    3. Uncontrolled type 2 diabetes mellitus with hyperglycemia    4. Type 2 diabetes mellitus with peripheral neuropathy  -     Ambulatory Referral to Endocrinology    5. Xerosis of skin    Patient is a 58-year-old uncontrolled diabetic female that presents with issues to her feet.  She states that she has seen an outside podiatrist in the past for these issues.  She has had previous surgeries involving her left foot for apparent infection.  She states that she has had \" sepsis\" due to issues with her feet in the past.  After chart review, her most recent A1c that was listed from January 2022 was 13.5%.  She states that her primary care physician manages her diabetes.  She continues to feel that her fasting glucose levels are in the 300s.  Today, she has no open wounds or gross signs of infection.  She does have prominent mallet toe deformity and she does complain of pain with weightbearing activity.  She has moderate xerosis involving the feet.  I have discussed the situation with her at length.  I do feel that she is extremely high diabetic foot risk.  I do feel that medication compliance along with diet and exercise is very important to help control blood sugars.  I have asked that she talk to her primary care physician.  We also discussed consideration for referral to endocrinology.  I reviewed the importance of daily diabetic foot checks and moisturizing with Aquaphor to prevent calluses or open wounds.  Imaging was obtained and independently reviewed today showing prominent calcaneal spurring and Florin's deformity but " no evidence of fracture dislocation, or other concerning features were apparent.  I do feel that she requires close follow-up and should be seen in 3 months for routine diabetic foot check.  Patient is to follow-up with MARIAH Peterson.  Greater than 30 minutes spent before, during, and after evaluation for patient care.    Orders Placed This Encounter   Procedures    XR Foot 3+ View Left     Order Specific Question:   Reason for Exam:     Answer:   left foot pain rm 13 wb     Order Specific Question:   Does this patient have a diabetic monitoring/medication delivering device on?     Answer:   No     Order Specific Question:   Release to patient     Answer:   Routine Release [[id number]]    XR Foot 3+ View Right     Can leave after xray     Order Specific Question:   Reason for Exam:     Answer:   right foot pain rm 13 wb     Order Specific Question:   Release to patient     Answer:   Routine Release [[id number]]    Ambulatory Referral to Endocrinology     Referral Priority:   Routine     Referral Type:   Consultation     Referral Reason:   Specialty Services Required     Referred to Provider:   Amairani Cramer MD     Requested Specialty:   Endocrinology     Number of Visits Requested:   1        Note is dictated utilizing voice recognition software. Unfortunately this leads to occasional typographical errors. I apologize in advance if the situation occurs. If questions occur please do not hesitate to call our office.    Transcribed from ambient dictation for ANDERS Soto DPM by Bradly Head.  11/08/23   12:04 EST    Patient or patient representative verbalized consent to the visit recording.  I have personally performed the services described in this document as transcribed by the above individual, and it is both accurate and complete.

## 2023-11-16 ENCOUNTER — TELEPHONE (OUTPATIENT)
Dept: FAMILY MEDICINE CLINIC | Facility: CLINIC | Age: 58
End: 2023-11-16

## 2023-11-16 NOTE — TELEPHONE ENCOUNTER
She no showed her appointment today.  Her diabetes is not under control and she needs to reschedule appointment.

## 2023-12-14 ENCOUNTER — HOSPITAL ENCOUNTER (OUTPATIENT)
Facility: HOSPITAL | Age: 58
Setting detail: HOSPITAL OUTPATIENT SURGERY
Discharge: HOME | End: 2023-12-14
Attending: INTERNAL MEDICINE | Admitting: INTERNAL MEDICINE
Payer: MEDICAID

## 2023-12-14 ENCOUNTER — ON CAMPUS - OUTPATIENT (OUTPATIENT)
Dept: URBAN - METROPOLITAN AREA HOSPITAL 85 | Facility: HOSPITAL | Age: 58
End: 2023-12-14
Payer: COMMERCIAL

## 2023-12-14 ENCOUNTER — ANESTHESIA EVENT (OUTPATIENT)
Dept: GASTROENTEROLOGY | Facility: HOSPITAL | Age: 58
End: 2023-12-14
Payer: MEDICAID

## 2023-12-14 ENCOUNTER — ANESTHESIA (OUTPATIENT)
Dept: GASTROENTEROLOGY | Facility: HOSPITAL | Age: 58
End: 2023-12-14
Payer: MEDICAID

## 2023-12-14 VITALS
TEMPERATURE: 98.8 F | HEIGHT: 65 IN | OXYGEN SATURATION: 95 % | SYSTOLIC BLOOD PRESSURE: 90 MMHG | BODY MASS INDEX: 27.03 KG/M2 | DIASTOLIC BLOOD PRESSURE: 55 MMHG | HEART RATE: 85 BPM | WEIGHT: 162.26 LBS | RESPIRATION RATE: 16 BRPM

## 2023-12-14 DIAGNOSIS — R19.7 DIARRHEA: ICD-10-CM

## 2023-12-14 DIAGNOSIS — R19.7 DIARRHEA, UNSPECIFIED: ICD-10-CM

## 2023-12-14 DIAGNOSIS — K62.1 RECTAL POLYP: ICD-10-CM

## 2023-12-14 DIAGNOSIS — R15.9 FULL INCONTINENCE OF FECES: ICD-10-CM

## 2023-12-14 DIAGNOSIS — R15.9 FECAL INCONTINENCE: ICD-10-CM

## 2023-12-14 LAB
GLUCOSE BLDC GLUCOMTR-MCNC: 238 MG/DL (ref 70–105)
GLUCOSE BLDC GLUCOMTR-MCNC: 331 MG/DL (ref 70–105)

## 2023-12-14 PROCEDURE — 45385 COLONOSCOPY W/LESION REMOVAL: CPT | Performed by: INTERNAL MEDICINE

## 2023-12-14 PROCEDURE — 63710000001 INSULIN REGULAR HUMAN PER 5 UNITS: Performed by: ANESTHESIOLOGY

## 2023-12-14 PROCEDURE — 25010000002 PROPOFOL 200 MG/20ML EMULSION

## 2023-12-14 PROCEDURE — 82948 REAGENT STRIP/BLOOD GLUCOSE: CPT

## 2023-12-14 PROCEDURE — 88305 TISSUE EXAM BY PATHOLOGIST: CPT | Performed by: INTERNAL MEDICINE

## 2023-12-14 PROCEDURE — 45380 COLONOSCOPY AND BIOPSY: CPT | Mod: 59 | Performed by: INTERNAL MEDICINE

## 2023-12-14 PROCEDURE — 25810000003 SODIUM CHLORIDE 0.9 % SOLUTION: Performed by: INTERNAL MEDICINE

## 2023-12-14 RX ORDER — SODIUM CHLORIDE 9 MG/ML
50 INJECTION, SOLUTION INTRAVENOUS CONTINUOUS
Status: DISCONTINUED | OUTPATIENT
Start: 2023-12-14 | End: 2023-12-14 | Stop reason: HOSPADM

## 2023-12-14 RX ORDER — LIDOCAINE HYDROCHLORIDE 20 MG/ML
INJECTION, SOLUTION EPIDURAL; INFILTRATION; INTRACAUDAL; PERINEURAL AS NEEDED
Status: DISCONTINUED | OUTPATIENT
Start: 2023-12-14 | End: 2023-12-14 | Stop reason: SURG

## 2023-12-14 RX ORDER — PROPOFOL 10 MG/ML
INJECTION, EMULSION INTRAVENOUS AS NEEDED
Status: DISCONTINUED | OUTPATIENT
Start: 2023-12-14 | End: 2023-12-14 | Stop reason: SURG

## 2023-12-14 RX ORDER — ONDANSETRON 2 MG/ML
4 INJECTION INTRAMUSCULAR; INTRAVENOUS ONCE AS NEEDED
Status: DISCONTINUED | OUTPATIENT
Start: 2023-12-14 | End: 2023-12-14 | Stop reason: HOSPADM

## 2023-12-14 RX ADMIN — PROPOFOL 50 MG: 10 INJECTION, EMULSION INTRAVENOUS at 11:15

## 2023-12-14 RX ADMIN — PROPOFOL 50 MG: 10 INJECTION, EMULSION INTRAVENOUS at 11:31

## 2023-12-14 RX ADMIN — PROPOFOL 50 MG: 10 INJECTION, EMULSION INTRAVENOUS at 11:04

## 2023-12-14 RX ADMIN — INSULIN HUMAN 6 UNITS: 100 INJECTION, SOLUTION PARENTERAL at 10:10

## 2023-12-14 RX ADMIN — PROPOFOL 50 MG: 10 INJECTION, EMULSION INTRAVENOUS at 11:06

## 2023-12-14 RX ADMIN — PROPOFOL 50 MG: 10 INJECTION, EMULSION INTRAVENOUS at 11:19

## 2023-12-14 RX ADMIN — PROPOFOL 70 MG: 10 INJECTION, EMULSION INTRAVENOUS at 11:00

## 2023-12-14 RX ADMIN — PROPOFOL 30 MG: 10 INJECTION, EMULSION INTRAVENOUS at 11:02

## 2023-12-14 RX ADMIN — PROPOFOL 50 MG: 10 INJECTION, EMULSION INTRAVENOUS at 11:22

## 2023-12-14 RX ADMIN — SODIUM CHLORIDE 50 ML/HR: 9 INJECTION, SOLUTION INTRAVENOUS at 10:12

## 2023-12-14 RX ADMIN — LIDOCAINE HYDROCHLORIDE 60 MG: 20 INJECTION, SOLUTION EPIDURAL; INFILTRATION; INTRACAUDAL; PERINEURAL at 11:00

## 2023-12-14 RX ADMIN — PROPOFOL 50 MG: 10 INJECTION, EMULSION INTRAVENOUS at 11:26

## 2023-12-14 RX ADMIN — PROPOFOL 50 MG: 10 INJECTION, EMULSION INTRAVENOUS at 11:12

## 2023-12-14 NOTE — H&P
GI Procedure H&P:    Referring Provider:    Melania Damico APRN  [unfilled]    Chief complaint: <principal problem not specified>    Subjective .  Diarrhea and fecal incontinence    History of present illness:      Yana Araya is a 58 y.o. female who presents today for Procedure(s):  COLONOSCOPY for the indications listed below.     The updated Patient Profile was reviewed prior to the procedure, in conjunction with the Physical Exam, including medical conditions, surgical procedures, medications, allergies, family history and social history.     Pre-operatively, I reviewed the indication(s) for the procedure, the risks of the procedure [including but not limited to: unexpected bleeding possibly requiring hospitalization and/or unplanned repeat procedures, perforation possibly requiring surgical treatment, missed lesions and complications of sedation/MAC (also explained by anesthesia staff)].     I have evaluated the patient for risks associated with the planned anesthesia and the procedure to be performed and find the patient an acceptable candidate for IV sedation.    Multiple opportunities were provided for any questions or concerns, and all questions were answered satisfactorily before any anesthesia was administered. We will proceed with the planned procedure.    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 Welsh    CORONARY STENT PLACEMENT  06/09/2021    FOOT SURGERY Left 06/28/2022    Left first MTP J arthroplasty without implant/Ochoa arthroplasty; Plantar left great toe ulceration debridement with skin graft application; Dr. Tyree Velasco at [facility]    HYSTERECTOMY  04/23/2002    Dr. Casillas; due to abnormal cervical cells and heavy bleeding    SALIVARY GLAND EXCISION      TONSILLECTOMY         Social History:  Social History     Tobacco Use    Smoking status: Every Day     Packs/day: 1.00     Years: 15.00     Additional pack years: 0.00     Total pack years: 15.00     Types: Cigarettes     Start date: 1984    Smokeless tobacco: Never   Vaping Use    Vaping Use: Never used   Substance Use Topics    Alcohol use: Never    Drug use: Never       Family History:  Family History   Problem Relation Age of Onset    Diabetes Mother     Cancer Mother     No Known Problems Father     Heart disease Sister        Medications:  Medications Prior to Admission   Medication Sig Dispense Refill Last Dose    acetaminophen (TYLENOL) 500 MG tablet Take 2 tablets by mouth Every 6 (Six) Hours As Needed for Mild Pain.       albuterol sulfate  (90 Base) MCG/ACT inhaler 1 to 2 puffs inhalation every 4-6 hours as needed for cough and shortness of breath (Patient taking differently: Inhale 2 puffs Every 4 (Four) Hours As Needed. 1 to 2 puffs inhalation every 4-6 hours as needed for cough and shortness of breath) 18 g 1     apixaban (ELIQUIS) 5 MG tablet tablet Take 1 tablet by mouth Every 12 (Twelve) Hours. 60 tablet 3     atorvastatin (LIPITOR) 80 MG tablet Take 1 tablet by mouth Daily.       Blood Glucose Monitoring Suppl w/Device kit 1 Device Daily. DX E11.9 1 each 0     clopidogrel (PLAVIX) 75 MG tablet Take 1 tablet by mouth Daily.       empagliflozin (JARDIANCE) 25 MG tablet tablet Take 1 tablet by mouth Daily.       glucose blood test strip Blood  "sugar twice daily diabetes E11.9 100 each 12     Insulin Glargine (LANTUS SOLOSTAR) 100 UNIT/ML injection pen Inject 30 Units under the skin into the appropriate area as directed Daily. 9 mL 2     ketorolac (ACULAR) 0.5 % ophthalmic solution        Lancets misc 1 container 2 (Two) Times a Day. DX E11.9 100 each 2     moxifloxacin (VIGAMOX) 0.5 % ophthalmic solution        nystatin (MYCOSTATIN) 102781 UNIT/GM cream Apply 1 application  topically to the appropriate area as directed 2 (Two) Times a Day. 60 g 1     PARoxetine (PAXIL) 40 MG tablet Take 1 tablet by mouth Daily.       Pred Forte 1 % ophthalmic suspension        brompheniramine-pseudoephedrine-DM 30-2-10 MG/5ML syrup Take 10 mL by mouth 4 (Four) Times a Day As Needed for Congestion or Cough. (Patient not taking: Reported on 11/8/2023) 300 mL 0     metFORMIN (GLUCOPHAGE) 500 MG tablet Take 1 tablet by mouth 2 (Two) Times a Day With Meals. (Patient not taking: Reported on 11/8/2023) 180 tablet 1        Scheduled Meds:  Continuous Infusions:No current facility-administered medications for this encounter.    PRN Meds:.    ALLERGIES:  Penicillins and Sulfa antibiotics    ROS:  The following systems were reviewed and negative;   Constitution:  No fevers, chills, no unintentional weight loss  Skin: no rash, no jaundice  Eyes:  No blurry vision, no eye pain  HENT:  No change in hearing or smell  Resp:  No dyspnea or cough  CV:  No chest pain or palpitations  :  No dysuria, hematuria  Musculoskeletal:  No leg cramps or arthralgias  Neuro:  No tremor, no numbness  Psych:  No depression or confsuion    Objective     Vital Signs:   Vitals:    09/18/23 1156 12/04/23 1522   Weight: 73.5 kg (162 lb) 73.5 kg (162 lb)   Height: 165.1 cm (65\") 167.6 cm (66\")       Physical Exam:       General Appearance:    Awake and alert, in no acute distress   Head:    Normocephalic, without obvious abnormality, atraumatic   Throat:   No oral lesions, no thrush, oral mucosa moist "   Lungs:     respirations regular, even and unlabored   Skin:   No rash, no jaundice       Results Review:  Lab Results (last 24 hours)       Procedure Component Value Units Date/Time    POC Glucose Once [[id number]]  (Abnormal) Collected: 12/14/23 0851    Specimen: Blood Updated: 12/14/23 0854     Glucose 331 mg/dL      Comment: Serial Number: [id number]:  [id number]               Imaging Results (Last 24 Hours)       ** No results found for the last 24 hours. **             I reviewed the patient's labs and imaging.    ASSESSMENT AND PLAN:  Diarrhea and fecal incontinence    Active Problems:    * No active hospital problems. *       Procedure(s):  COLONOSCOPY      I discussed the patients findings and my recommendations with the patient.    Toribio Welsh MD  12/14/23  09:05 EST

## 2023-12-14 NOTE — DISCHARGE INSTRUCTIONS
A responsible adult should stay with you and you should rest quietly for the rest of the day.    Do not drink alcohol, drive, operate any heavy machinery or power tools or make any legal/important decisions for the next 24 hours.     Progress your diet as tolerated.  If you begin to experience severe pain, increased shortness of breath, racing heartbeat or a fever above 101 F, seek immediate medical attention.     Follow up with MD as instructed. Call office for results in 3 to 5 days if needed.    [phone]    Findings:  Normal mucosa throughout the colon and terminal ileum status post biopsies  6 mm sessile polyp in the proximal rectum removed via cold snare polypectomy     Impression:  Diarrhea and fecal incontinence normal mucosa status post biopsies to rule out microscopic colitis  Rectal polyp removed via cold snare polypectomy     Recommendations:  Follow-up biopsy results  Repeat colonoscopy in 5 years since prep was fair

## 2023-12-14 NOTE — OP NOTE
COLONOSCOPY Procedure Report    Patient Name:  Yana Araya  YOB: 1965    Date of Surgery:  12/14/2023     Pre-Op Diagnosis:  Diarrhea [R19.7]  Fecal incontinence [R15.9]       Post-Op Diagnosis Codes:     * Diarrhea [R19.7]     * Fecal incontinence [R15.9]  Normal mucosa throughout the colon and terminal ileum status post biopsies  6 mm sessile polyp in the proximal rectum removed via cold snare polypectomy    Procedure/CPT® Codes:      Procedure(s):  COLONOSCOPY with cold forcep biopsy x2 areas and hot snare polypectomy x1    Staff:  Surgeon(s):  Toribio Welsh MD      Anesthesia: Monitored Anesthesia Care    Description of Procedure:  A description of the procedure as well as risks, benefits and alternative methods were explained to the patient who voiced understanding and signed the corresponding consent form. A physical exam was performed and vital signs were monitored throughout the procedure.    A rectal exam was performed which was normal. An Olympus colonoscope was placed into the rectum and proceeded under direct visualization through the colon until the cecum and appendiceal orifice were identified. Careful visualization occurred upon slow withdraw of the scope. The scope was then retroflexed and the distal rectum was visualized. The quality of the prep was fair despite using Gator flushing. The procedure was not difficult and there were no immediate complications.    Specimen:        See Below    Estimated blood loss: none    Complications:  None    Findings:  Normal mucosa throughout the colon and terminal ileum status post biopsies  6 mm sessile polyp in the proximal rectum removed via cold snare polypectomy    Impression:  Diarrhea and fecal incontinence normal mucosa status post biopsies to rule out microscopic colitis  Rectal polyp removed via cold snare polypectomy    Recommendations:  Follow-up biopsy results  Repeat colonoscopy in 5 years since prep was fair      Toribio  MD Blaise     Date: 12/14/2023    Time: 11:35 EST

## 2023-12-14 NOTE — ANESTHESIA POSTPROCEDURE EVALUATION
Patient: Yana Araya    Procedure Summary       Date: 12/14/23 Room / Location: [facility] ENDOSCOPY 4 / [facility] ENDOSCOPY    Anesthesia Start: 1054 Anesthesia Stop: 1140    Procedure: COLONOSCOPY with cold forcep biopsy x2 areas and hot snare polypectomy x1 Diagnosis:       Diarrhea      Fecal incontinence      (Diarrhea [R19.7])      (Fecal incontinence [R15.9])    Surgeons: Toribio Welsh MD Provider: Jarad Murray MD    Anesthesia Type: general ASA Status: 3            Anesthesia Type: general    Vitals  Vitals Value Taken Time   BP 87/55 12/14/23 1207   Temp     Pulse 83 12/14/23 1208   Resp 16 12/14/23 1200   SpO2 93 % 12/14/23 1208   Vitals shown include unfiled device data.        Post Anesthesia Care and Evaluation    Patient location during evaluation: PACU  Patient participation: complete - patient participated  Level of consciousness: awake  Pain score: 0  Pain management: adequate  Anesthetic complications: No anesthetic complications  PONV Status: none  Cardiovascular status: acceptable  Respiratory status: acceptable  Hydration status: acceptable

## 2023-12-14 NOTE — ANESTHESIA PREPROCEDURE EVALUATION
Anesthesia Evaluation     Patient summary reviewed and Nursing notes reviewed   NPO Solid Status: > 8 hours             Airway   Mallampati: II  TM distance: >3 FB  Neck ROM: full  No difficulty expected  Dental - normal exam   (+) edentulous    Pulmonary - normal exam   (+) a smoker Current, COPD moderate,  Cardiovascular - normal exam    ECG reviewed    (+) hypertension, past MI , CAD, dysrhythmias Atrial Fib, hyperlipidemia      Neuro/Psych- negative ROS  GI/Hepatic/Renal/Endo    (+) diabetes mellitus type 2 poorly controlled using insulin    Musculoskeletal (-) negative ROS    Abdominal  - normal exam    Bowel sounds: normal.   Substance History - negative use     OB/GYN negative ob/gyn ROS         Other                    Anesthesia Plan    ASA 3     general       Anesthetic plan, risks, benefits, and alternatives have been provided, discussed and informed consent has been obtained with: patient.    CODE STATUS:

## 2023-12-15 ENCOUNTER — OFFICE VISIT (OUTPATIENT)
Dept: FAMILY MEDICINE CLINIC | Facility: CLINIC | Age: 58
End: 2023-12-15
Payer: MEDICAID

## 2023-12-15 VITALS
DIASTOLIC BLOOD PRESSURE: 81 MMHG | HEART RATE: 94 BPM | OXYGEN SATURATION: 96 % | WEIGHT: 162 LBS | TEMPERATURE: 97.9 F | SYSTOLIC BLOOD PRESSURE: 115 MMHG | BODY MASS INDEX: 26.99 KG/M2 | HEIGHT: 65 IN | RESPIRATION RATE: 18 BRPM

## 2023-12-15 DIAGNOSIS — R05.9 COUGH, UNSPECIFIED TYPE: ICD-10-CM

## 2023-12-15 DIAGNOSIS — E66.3 OVERWEIGHT (BMI 25.0-29.9): ICD-10-CM

## 2023-12-15 DIAGNOSIS — I48.92 ATRIAL FLUTTER, PAROXYSMAL: ICD-10-CM

## 2023-12-15 DIAGNOSIS — I73.9 PVD (PERIPHERAL VASCULAR DISEASE): ICD-10-CM

## 2023-12-15 DIAGNOSIS — E11.40 TYPE 2 DIABETES MELLITUS WITH DIABETIC NEUROPATHY, WITH LONG-TERM CURRENT USE OF INSULIN: Primary | ICD-10-CM

## 2023-12-15 DIAGNOSIS — Z79.01 ON ANTICOAGULANT THERAPY: ICD-10-CM

## 2023-12-15 DIAGNOSIS — Z28.21 INFLUENZA VACCINATION DECLINED: ICD-10-CM

## 2023-12-15 DIAGNOSIS — F32.A DEPRESSION, UNSPECIFIED DEPRESSION TYPE: ICD-10-CM

## 2023-12-15 DIAGNOSIS — I25.10 CORONARY ARTERY DISEASE INVOLVING NATIVE CORONARY ARTERY OF NATIVE HEART WITHOUT ANGINA PECTORIS: ICD-10-CM

## 2023-12-15 DIAGNOSIS — R53.83 FATIGUE, UNSPECIFIED TYPE: ICD-10-CM

## 2023-12-15 DIAGNOSIS — Z79.4 TYPE 2 DIABETES MELLITUS WITH DIABETIC NEUROPATHY, WITH LONG-TERM CURRENT USE OF INSULIN: Primary | ICD-10-CM

## 2023-12-15 DIAGNOSIS — R74.8 ELEVATED LIVER ENZYMES: ICD-10-CM

## 2023-12-15 DIAGNOSIS — F17.210 TOBACCO DEPENDENCE DUE TO CIGARETTES: ICD-10-CM

## 2023-12-15 DIAGNOSIS — E78.5 DYSLIPIDEMIA: ICD-10-CM

## 2023-12-15 LAB
EXPIRATION DATE: ABNORMAL
HBA1C MFR BLD: 13.1 % (ref 4.5–5.7)
LAB AP CASE REPORT: NORMAL
Lab: ABNORMAL
PATH REPORT.FINAL DX SPEC: NORMAL
PATH REPORT.GROSS SPEC: NORMAL

## 2023-12-15 RX ORDER — PAROXETINE HYDROCHLORIDE 40 MG/1
40 TABLET, FILM COATED ORAL DAILY
Qty: 90 TABLET | Refills: 1 | Status: SHIPPED | OUTPATIENT
Start: 2023-12-15

## 2023-12-15 RX ORDER — ATORVASTATIN CALCIUM 80 MG/1
80 TABLET, FILM COATED ORAL DAILY
Qty: 90 TABLET | Refills: 3 | Status: SHIPPED | OUTPATIENT
Start: 2023-12-15

## 2023-12-15 RX ORDER — ALBUTEROL SULFATE 90 UG/1
AEROSOL, METERED RESPIRATORY (INHALATION)
Qty: 18 G | Refills: 2 | Status: SHIPPED | OUTPATIENT
Start: 2023-12-15

## 2023-12-15 NOTE — PROGRESS NOTES
Chief Complaint  Diabetes and Hyperlipidemia    Subjective          Yana is a 58 y.o. female  who presents to [facility]     Patient Care Team:  Melania Damico APRN as PCP - General (Family Medicine)  Darian Babcock MD as Cardiologist (Interventional Cardiology)     History of Present Illness  Patient presents for follow-up of diabetes  This is an established problem  Interim treatment changes: Increased Lantus to 30 units daily and started Metformin 500 mg BID on 9/22/2023  Current medications: Jardiance 25 mg daily, Lantus 30 units daily and Metformin 500 mg BID  Checking blood sugar at home: Yes.  Maybe 2 days per week  Fasting sugars: states it varies 270-320  Hgba1c 14 % on 8/23/2023  Diabetic eye exam: 10/4/2023  She did not bring glucose readings.     Patient presents for follow-up of hyperlipidemia  This is an established problem  Interim treatment changes: none  Current medications: atorvastatin 80 mg daily  Lipid panel 8/23/2023     Patient presents for follow-up of CAD and PVD  This is an established problem  Interim treatment changes: none  Current medications: Eliquis, Plavix  She was referred to vascular surgeon.  She was not able to make the appointment.  She has an appointment with her cardiologist next week (12/18/2023)    She has not been able to complete testing that was ordered on 9/22/2023 (carotid US, LISET and CT lung cancer screening)      Yana Araya  has a past medical history of Anxiety, CAD (coronary artery disease), Depression, Diabetes, Diabetic retinopathy (08/22/2023), Difficulty walking, Dyslipidemia, Fibromyalgia, History of inferior wall myocardial infarction, Hyperlipidemia, MI (myocardial infarction), Neuropathy in diabetes, Orthostatic hypotension, Paroxysmal atrial flutter, Peripheral vascular disease, and Sleep apnea.      Review of Systems   Constitutional:  Positive for fatigue.   Eyes:  Negative for visual disturbance.    Respiratory:  Negative for cough and shortness of breath.    Cardiovascular:  Negative for chest pain, palpitations and leg swelling.   Gastrointestinal:  Negative for blood in stool and diarrhea.   Endocrine: Positive for polydipsia. Negative for polyuria.   Genitourinary:  Negative for hematuria.        Family History   Problem Relation Age of Onset    Diabetes Mother     Cancer Mother     No Known Problems Father     Heart disease Sister         Past Surgical History:   Procedure Laterality Date    CATARACT EXTRACTION Bilateral 10/23/2023    and 10/30/2023    COLONOSCOPY  04/25/2014    polyps; Dr. Welsh    CORONARY STENT PLACEMENT  06/09/2021    FOOT SURGERY Left 06/28/2022    Left first MTP J arthroplasty without implant/Ochoa arthroplasty; Plantar left great toe ulceration debridement with skin graft application; Dr. Tyree Velasco at [facility]    HYSTERECTOMY  04/23/2002    Dr. Casillas; due to abnormal cervical cells and heavy bleeding    SALIVARY GLAND EXCISION      TONSILLECTOMY          Social History     Socioeconomic History    Marital status:     Number of children: 2    Highest education level: 11th grade   Tobacco Use    Smoking status: Every Day     Packs/day: 1.00     Years: 15.00     Additional pack years: 0.00     Total pack years: 15.00     Types: Cigarettes     Start date: 1984    Smokeless tobacco: Never   Vaping Use    Vaping Use: Never used   Substance and Sexual Activity    Alcohol use: Never    Drug use: Never    Sexual activity: Not Currently     Partners: Male     Birth control/protection: Hysterectomy        Immunization History   Administered Date(s) Administered    Pneumococcal Conjugate 20-Valent (PCV20) 08/23/2023    Tdap 03/02/2023       Objective       Current Outpatient Medications:     acetaminophen (TYLENOL) 500 MG tablet, Take 2 tablets by mouth Every 6 (Six) Hours As Needed for Mild Pain., Disp: , Rfl:     albuterol sulfate  (90 Base) MCG/ACT  "inhaler, 1 to 2 puffs inhalation every 4-6 hours as needed for cough and shortness of breath, Disp: 18 g, Rfl: 2    apixaban (ELIQUIS) 5 MG tablet tablet, Take 1 tablet by mouth Every 12 (Twelve) Hours., Disp: 60 tablet, Rfl: 3    atorvastatin (LIPITOR) 80 MG tablet, Take 1 tablet by mouth Daily., Disp: 90 tablet, Rfl: 3    Blood Glucose Monitoring Suppl w/Device kit, 1 Device Daily. DX E11.9, Disp: 1 each, Rfl: 0    clopidogrel (PLAVIX) 75 MG tablet, Take 1 tablet by mouth Daily., Disp: , Rfl:     empagliflozin (JARDIANCE) 25 MG tablet tablet, Take 1 tablet by mouth Daily., Disp: 90 tablet, Rfl: 1    glucose blood test strip, Blood sugar twice daily diabetes E11.9, Disp: 100 each, Rfl: 12    Insulin Glargine (LANTUS SOLOSTAR) 100 UNIT/ML injection pen, Inject 35 Units under the skin into the appropriate area as directed Daily., Disp: , Rfl:     Lancets misc, 1 container 2 (Two) Times a Day. DX E11.9, Disp: 100 each, Rfl: 2    nystatin (MYCOSTATIN) 732910 UNIT/GM cream, Apply 1 application  topically to the appropriate area as directed 2 (Two) Times a Day., Disp: 60 g, Rfl: 1    PARoxetine (PAXIL) 40 MG tablet, Take 1 tablet by mouth Daily., Disp: 90 tablet, Rfl: 1    metFORMIN (GLUCOPHAGE) 1000 MG tablet, Take 1 tablet by mouth 2 (Two) Times a Day With Meals., Disp: 180 tablet, Rfl: 1    Vital Signs:      /81   Pulse 94   Temp 97.9 °F (36.6 °C) (Temporal)   Resp 18   Ht 165.1 cm (65\")   Wt 73.5 kg (162 lb)   SpO2 96%   BMI 26.96 kg/m²     Vitals:    12/15/23 1312   BP: 115/81   Pulse: 94   Resp: 18   Temp: 97.9 °F (36.6 °C)   TempSrc: Temporal   SpO2: 96%   Weight: 73.5 kg (162 lb)   Height: 165.1 cm (65\")      Physical Exam  Vitals reviewed.   Constitutional:       General: She is not in acute distress.     Appearance: Normal appearance. She is not diaphoretic.   HENT:      Head: Normocephalic and atraumatic.      Right Ear: Tympanic membrane, ear canal and external ear normal.      Left Ear: Tympanic " membrane, ear canal and external ear normal.      Mouth/Throat:      Mouth: Mucous membranes are moist.      Pharynx: Oropharynx is clear.   Eyes:      General: No scleral icterus.     Conjunctiva/sclera: Conjunctivae normal.   Cardiovascular:      Rate and Rhythm: Normal rate and regular rhythm.      Heart sounds: Normal heart sounds.   Pulmonary:      Effort: Pulmonary effort is normal. No respiratory distress.      Breath sounds: Normal breath sounds. No wheezing.   Musculoskeletal:      Left shoulder: Normal.      Cervical back: Neck supple.      Right lower leg: No edema.      Left lower leg: No edema.   Lymphadenopathy:      Cervical: No cervical adenopathy.   Skin:     General: Skin is warm and dry.   Neurological:      Mental Status: She is alert and oriented to person, place, and time.   Psychiatric:         Mood and Affect: Mood normal.        Result Review :   The following data was reviewed by: MAGNUS Ross on 12/15/2023:  A1C Last 3 Results          8/23/2023    08:43 12/15/2023    14:20   HGBA1C Last 3 Results   Hemoglobin A1C 14  13.1                  Assessment and Plan    Diagnoses and all orders for this visit:    1. Type 2 diabetes mellitus with diabetic neuropathy, with long-term current use of insulin (Primary)  Overview:  Hgba1c 14 % on 8/23/2023  Hgba1c 13.1 % on 12/15/2023    Assessment & Plan:  Chronic problem.  Uncontrolled.  Increase Lantus to 35 units daily.    Increase Metformin 500 mg BID to 1000 mg BID  Continue Jardiance 25 mg daily.  Check sugar fasting every morning and increase by 1 unit per day for fasting sugar over 100.  Bring blood sugar readings to next appointment.    Refer to endocrinology    Orders:  -     POC Glycosylated Hemoglobin (Hb A1C)  -     empagliflozin (JARDIANCE) 25 MG tablet tablet; Take 1 tablet by mouth Daily.  Dispense: 90 tablet; Refill: 1  -     Ambulatory Referral to Endocrinology  -     metFORMIN (GLUCOPHAGE) 1000 MG tablet; Take 1 tablet by  mouth 2 (Two) Times a Day With Meals.  Dispense: 180 tablet; Refill: 1  -     Insulin Glargine (LANTUS SOLOSTAR) 100 UNIT/ML injection pen; Inject 35 Units under the skin into the appropriate area as directed Daily.    2. Dyslipidemia  Assessment & Plan:  Chronic stable problem.  Continue atorvastatin 80 mg daily.  Recheck fasting lipid panel in August 2024    Orders:  -     atorvastatin (LIPITOR) 80 MG tablet; Take 1 tablet by mouth Daily.  Dispense: 90 tablet; Refill: 3    3. PVD (peripheral vascular disease)  Overview:  Per cardiology note - She had a CTA with run off. This documents CTA with borderline multifocal stenosis in the L common iliac artery, R > L with diffuse SFA disease, three vessel run off bilaterally, severe nolvia superficial varicosites the majority appeared to be arising from the bilateral greater saphenous veins, the right common femoral artery may represent dissection flap vs. Residual chronic thrombus. Dr. Menard spoke with Dr. Babcock. She is suppose to have surgery but Medicaid will not approve.        Assessment & Plan:  She was given the phone number to call vascular surgery office to reschedule appointment.      4. Coronary artery disease involving native coronary artery of native heart without angina pectoris  Assessment & Plan:  Follow up with cardiology as scheduled on 12/18/2023      5. Atrial flutter, paroxysmal  Overview:  Per cardiology note - occurred post MI. Arrhythmia has not been documented in recurrence. She remains on oral anticoagulation due to elevated cardiac embolic risk score.      Assessment & Plan:  Chronic stable problem.   Continue Eliquis 5 mg BID.  Follow up with cardiology as scheduled on 12/18/2023      6. Depression, unspecified depression type  Assessment & Plan:  Chronic stable problem.  She needs a refill of Paxil 40 mg daily    Orders:  -     PARoxetine (PAXIL) 40 MG tablet; Take 1 tablet by mouth Daily.  Dispense: 90 tablet; Refill: 1    7. Tobacco  dependence due to cigarettes  Assessment & Plan:  Discussed smoking cessation and that it is one of the best things you can do for your health.  She states she is not ready to quit.      8. Elevated liver enzymes  -     Comprehensive Metabolic Panel    9. On anticoagulant therapy  -     CBC & Differential    10. Fatigue, unspecified type  -     TSH Rfx On Abnormal To Free T4  -     Vitamin B12  -     Folate    11. Cough, unspecified type  -     albuterol sulfate  (90 Base) MCG/ACT inhaler; 1 to 2 puffs inhalation every 4-6 hours as needed for cough and shortness of breath  Dispense: 18 g; Refill: 2    12. Influenza vaccination declined  Comments:  Recommended flu vaccine but she declined.    13. Overweight (BMI 25.0-29.9)  Assessment & Plan:  Patient's (Body mass index is 26.96 kg/m².) indicates that they are overweight with health conditions that include coronary heart disease, diabetes mellitus, dyslipidemias, and peripheral vascular disease . Weight is unchanged. BMI  is above average; BMI management plan is completed. We discussed portion control and increasing exercise.          Authorization for testing has  and scheduling will work on getting testing rescheduled.      Follow Up   Return in about 3 months (around 3/15/2024) for follow up diabetes.  Patient was given instructions and counseling regarding her condition or for health maintenance advice. Please see specific information pulled into the AVS if appropriate.    Patient Instructions   Call and make an appointment with vascular surgeon

## 2023-12-16 PROBLEM — F32.A DEPRESSION: Status: ACTIVE | Noted: 2023-12-16

## 2023-12-16 LAB
ALBUMIN SERPL-MCNC: 4 G/DL (ref 3.8–4.9)
ALBUMIN/GLOB SERPL: 1.8 {RATIO} (ref 1.2–2.2)
ALP SERPL-CCNC: 151 IU/L (ref 44–121)
ALT SERPL-CCNC: 46 IU/L (ref 0–32)
AST SERPL-CCNC: 24 IU/L (ref 0–40)
BASOPHILS # BLD AUTO: 0.1 X10E3/UL (ref 0–0.2)
BASOPHILS NFR BLD AUTO: 1 %
BILIRUB SERPL-MCNC: 0.5 MG/DL (ref 0–1.2)
BUN SERPL-MCNC: 4 MG/DL (ref 6–24)
BUN/CREAT SERPL: 7 (ref 9–23)
CALCIUM SERPL-MCNC: 9.7 MG/DL (ref 8.7–10.2)
CHLORIDE SERPL-SCNC: 98 MMOL/L (ref 96–106)
CO2 SERPL-SCNC: 25 MMOL/L (ref 20–29)
CREAT SERPL-MCNC: 0.6 MG/DL (ref 0.57–1)
EGFRCR SERPLBLD CKD-EPI 2021: 105 ML/MIN/1.73
EOSINOPHIL # BLD AUTO: 0.2 X10E3/UL (ref 0–0.4)
EOSINOPHIL NFR BLD AUTO: 2 %
ERYTHROCYTE [DISTWIDTH] IN BLOOD BY AUTOMATED COUNT: 12.5 % (ref 11.7–15.4)
FOLATE SERPL-MCNC: 12 NG/ML
GLOBULIN SER CALC-MCNC: 2.2 G/DL (ref 1.5–4.5)
GLUCOSE SERPL-MCNC: 293 MG/DL (ref 70–99)
HCT VFR BLD AUTO: 51.4 % (ref 34–46.6)
HGB BLD-MCNC: 16.4 G/DL (ref 11.1–15.9)
IMM GRANULOCYTES # BLD AUTO: 0 X10E3/UL (ref 0–0.1)
IMM GRANULOCYTES NFR BLD AUTO: 0 %
LYMPHOCYTES # BLD AUTO: 4.2 X10E3/UL (ref 0.7–3.1)
LYMPHOCYTES NFR BLD AUTO: 41 %
MCH RBC QN AUTO: 26.8 PG (ref 26.6–33)
MCHC RBC AUTO-ENTMCNC: 31.9 G/DL (ref 31.5–35.7)
MCV RBC AUTO: 84 FL (ref 79–97)
MONOCYTES # BLD AUTO: 0.5 X10E3/UL (ref 0.1–0.9)
MONOCYTES NFR BLD AUTO: 5 %
NEUTROPHILS # BLD AUTO: 5.2 X10E3/UL (ref 1.4–7)
NEUTROPHILS NFR BLD AUTO: 51 %
PLATELET # BLD AUTO: 217 X10E3/UL (ref 150–450)
POTASSIUM SERPL-SCNC: 4.3 MMOL/L (ref 3.5–5.2)
PROT SERPL-MCNC: 6.2 G/DL (ref 6–8.5)
RBC # BLD AUTO: 6.12 X10E6/UL (ref 3.77–5.28)
SODIUM SERPL-SCNC: 140 MMOL/L (ref 134–144)
TSH SERPL DL<=0.005 MIU/L-ACNC: 0.71 UIU/ML (ref 0.45–4.5)
VIT B12 SERPL-MCNC: 667 PG/ML (ref 232–1245)
WBC # BLD AUTO: 10.3 X10E3/UL (ref 3.4–10.8)

## 2023-12-17 NOTE — ASSESSMENT & PLAN NOTE
Chronic problem.  Uncontrolled.  Increase Lantus to 35 units daily.    Increase Metformin 500 mg BID to 1000 mg BID  Continue Jardiance 25 mg daily.  Check sugar fasting every morning and increase by 1 unit per day for fasting sugar over 100.  Bring blood sugar readings to next appointment.    Refer to endocrinology

## 2023-12-17 NOTE — ASSESSMENT & PLAN NOTE
Patient's (Body mass index is 26.96 kg/m².) indicates that they are overweight with health conditions that include coronary heart disease, diabetes mellitus, dyslipidemias, and peripheral vascular disease . Weight is unchanged. BMI  is above average; BMI management plan is completed. We discussed portion control and increasing exercise.

## 2023-12-17 NOTE — ASSESSMENT & PLAN NOTE
Discussed smoking cessation and that it is one of the best things you can do for your health.  She states she is not ready to quit.

## 2023-12-17 NOTE — PROGRESS NOTES
Let her know:    1. CBC - Red blood cells, hemoglobin and hematocrit are elevated. This can be due to dehydration.  Drink plenty of water to stay hydrated and also work on getting diabetes under control.  Recheck in 3-6 months.    2. CMP - glucose is high  Electrolytes and kidney function are normal.  ALT (liver enzyme) elevated.  Alk phos is elevated  Recheck 3 months.    3. TSH - thyroid function is normal.     4. Vitamin B12 level is normal.     5. Folate level is normal    6. Follow up in 3 months

## 2023-12-17 NOTE — ASSESSMENT & PLAN NOTE
Chronic stable problem.   Continue Eliquis 5 mg BID.  Follow up with cardiology as scheduled on 12/18/2023

## 2023-12-21 ENCOUNTER — TELEMEDICINE (OUTPATIENT)
Dept: FAMILY MEDICINE CLINIC | Facility: TELEHEALTH | Age: 58
End: 2023-12-21
Payer: MEDICAID

## 2023-12-21 ENCOUNTER — TELEPHONE (OUTPATIENT)
Dept: FAMILY MEDICINE CLINIC | Facility: CLINIC | Age: 58
End: 2023-12-21
Payer: MEDICAID

## 2023-12-21 DIAGNOSIS — J01.40 ACUTE PANSINUSITIS, RECURRENCE NOT SPECIFIED: Primary | ICD-10-CM

## 2023-12-21 RX ORDER — AZITHROMYCIN 250 MG/1
TABLET, FILM COATED ORAL
Qty: 6 TABLET | Refills: 0 | Status: SHIPPED | OUTPATIENT
Start: 2023-12-21

## 2023-12-21 RX ORDER — BENZONATATE 200 MG/1
200 CAPSULE ORAL 3 TIMES DAILY PRN
Qty: 21 CAPSULE | Refills: 0 | Status: SHIPPED | OUTPATIENT
Start: 2023-12-21

## 2023-12-21 RX ORDER — COVID-19 ANTIGEN TEST
1 KIT MISCELLANEOUS ONCE
Qty: 1 KIT | Refills: 0 | Status: SHIPPED | OUTPATIENT
Start: 2023-12-21 | End: 2023-12-21

## 2023-12-21 RX ORDER — METHYLPREDNISOLONE 4 MG/1
TABLET ORAL
Qty: 21 TABLET | Refills: 0 | Status: SHIPPED | OUTPATIENT
Start: 2023-12-21

## 2023-12-21 NOTE — PROGRESS NOTES
You have chosen to receive care through a telehealth visit.  Do you consent to use a video/audio connection for your medical care today? Yes     CHIEF COMPLAINT  No chief complaint on file.        HPI  Yana Araya is a 58 y.o. female  presents with complaint of 1 day sudden onset of chills, sweats, body aches, 1 month history of sinus congestion with yellow/green/bloody discharge, cough, runny nose.  Unsure of fever, cough is productive with yellow sputum, facial tenderness/pressure.  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 edema,    Difficulty walking     Dyslipidemia     Fibromyalgia     History of inferior wall myocardial infarction     Hyperlipidemia     MI (myocardial infarction)     s/p STEMI 6/2021    Neuropathy in diabetes     Orthostatic hypotension     Paroxysmal atrial flutter     Peripheral vascular disease     Sleep apnea        Family History   Problem Relation Age of Onset    Diabetes Mother     Cancer Mother     No Known Problems Father     Heart disease Sister        Social History     Socioeconomic History    Marital status:     Number of children: 2    Highest education level: 11th grade   Tobacco Use    Smoking status: Every Day     Packs/day: 1.00     Years: 15.00     Additional pack years: 0.00     Total pack years: 15.00     Types: Cigarettes     Start date: 1984    Smokeless tobacco: Never   Vaping Use    Vaping Use: Never used   Substance and Sexual Activity    Alcohol use: Never    Drug use: Never    Sexual activity: Not Currently     Partners: Male     Birth control/protection: Hysterectomy       Yana Araya  reports that she has been smoking cigarettes.  She started smoking about 39 years ago. She has a 15.00 pack-year smoking history. She has never used smokeless tobacco.              There were no vitals taken for this visit.    PHYSICAL EXAM  Physical Exam   Constitutional: She is oriented to person, place, and time. She appears well-developed and well-nourished. She does not have a sickly appearance. She does not appear ill.   HENT:   Head: Normocephalic and atraumatic.   Nose: Right sinus exhibits maxillary sinus tenderness and frontal sinus tenderness. Left sinus exhibits maxillary sinus tenderness and frontal sinus tenderness.   Pulmonary/Chest: Effort normal.  No respiratory distress.  Neurological: She is alert and oriented to person, place, and time.           Diagnoses and all orders for this visit:    1. Acute pansinusitis, recurrence not specified (Primary)  -     COVID-19 At Home Antigen Test (BinaxNOW COVID-19 Ag Home Test) kit; 1 each by In Vitro route 1 (One) Time for 1 dose.  Dispense: 1 kit; Refill: 0  -     azithromycin (Zithromax Z-Yordan) 250 MG tablet; Take 2 tablets by mouth on day 1, then 1 tablet daily on days 2-5  Dispense: 6 tablet; Refill: 0  -     methylPREDNISolone (MEDROL) 4 MG dose pack; Take as directed on package instructions.  Dispense: 21 tablet; Refill: 0  -     benzonatate (TESSALON) 200 MG capsule; Take 1 capsule by mouth 3 (Three) Times a Day As Needed for Cough.  Dispense: 21 capsule; Refill: 0    --take medications as prescribed  --increase fluids, rest as needed, tylenol or ibuprofen for pain  --f/u in 5-7 days if no improvement        FOLLOW-UP  As discussed during visit with PCP/[facility] if no improvement or Urgent Care/Emergency Department if worsening of symptoms    Patient verbalizes understanding of medication dosage, comfort measures, instructions for treatment and follow-up.    Jacqueline Farmer, MAGNUS  12/21/2023  15:05 EST    The use of a video visit has been reviewed with the patient and verbal informed consent has  been obtained. Myself and Yana Araya participated in this visit. The patient is located in [address] [facility] [address] [place] [facility].    I am located in [place], [place]. [employer] and [employer] were utilized. I spent 8 minutes in the patient's chart for this visit.

## 2023-12-21 NOTE — PATIENT INSTRUCTIONS
Sinus Infection, Adult  A sinus infection, also called sinusitis, is inflammation of your sinuses. Sinuses are hollow spaces in the bones around your face. Your sinuses are located:  Around your eyes.  In the middle of your forehead.  Behind your nose.  In your cheekbones.  Mucus normally drains out of your sinuses. When your nasal tissues become inflamed or swollen, mucus can become trapped or blocked. This allows bacteria, viruses, and fungi to grow, which leads to infection. Most infections of the sinuses are caused by a virus.  A sinus infection can develop quickly. It can last for up to 4 weeks (acute) or for more than 12 weeks (chronic). A sinus infection often develops after a cold.  What are the causes?  This condition is caused by anything that creates swelling in the sinuses or stops mucus from draining. This includes:  Allergies.  Asthma.  Infection from bacteria or viruses.  Deformities or blockages in your nose or sinuses.  Abnormal growths in the nose (nasal polyps).  Pollutants, such as chemicals or irritants in the air.  Infection from fungi. This is rare.  What increases the risk?  You are more likely to develop this condition if you:  Have a weak body defense system (immune system).  Do a lot of swimming or diving.  Overuse nasal sprays.  Smoke.  What are the signs or symptoms?  The main symptoms of this condition are pain and a feeling of pressure around the affected sinuses. Other symptoms include:  Stuffy nose or congestion that makes it difficult to breathe through your nose.  Thick yellow or greenish drainage from your nose.  Tenderness, swelling, and warmth over the affected sinuses.  A cough that may get worse at night.  Decreased sense of smell and taste.  Extra mucus that collects in the throat or the back of the nose (postnasal drip) causing a sore throat or bad breath.  Tiredness (fatigue).  Fever.  How is this diagnosed?  This condition is diagnosed based on:  Your symptoms.  Your  medical history.  A physical exam.  Tests to find out if your condition is acute or chronic. This may include:  Checking your nose for nasal polyps.  Viewing your sinuses using a device that has a light (endoscope).  Testing for allergies or bacteria.  Imaging tests, such as an MRI or CT scan.  In rare cases, a bone biopsy may be done to rule out more serious types of fungal sinus disease.  How is this treated?  Treatment for a sinus infection depends on the cause and whether your condition is chronic or acute.  If caused by a virus, your symptoms should go away on their own within 10 days. You may be given medicines to relieve symptoms. They include:  Medicines that shrink swollen nasal passages (decongestants).  A spray that eases inflammation of the nostrils (topical intranasal corticosteroids).  Rinses that help get rid of thick mucus in your nose (nasal saline washes).  Medicines that treat allergies (antihistamines).  Over-the-counter pain relievers.  If caused by bacteria, your health care provider may recommend waiting to see if your symptoms improve. Most bacterial infections will get better without antibiotic medicine. You may be given antibiotics if you have:  A severe infection.  A weak immune system.  If caused by narrow nasal passages or nasal polyps, surgery may be needed.  Follow these instructions at home:  Medicines  Take, use, or apply over-the-counter and prescription medicines only as told by your health care provider. These may include nasal sprays.  If you were prescribed an antibiotic medicine, take it as told by your health care provider. Do not stop taking the antibiotic even if you start to feel better.  Hydrate and humidify    Drink enough fluid to keep your urine pale yellow. Staying hydrated will help to thin your mucus.  Use a cool mist humidifier to keep the humidity level in your home above 50%.  Inhale steam for 10-15 minutes, 3-4 times a day, or as told by your health care  provider. You can do this in the bathroom while a hot shower is running.  Limit your exposure to cool or dry air.  Rest  Rest as much as possible.  Sleep with your head raised (elevated).  Make sure you get enough sleep each night.  General instructions    Apply a warm, moist washcloth to your face 3-4 times a day or as told by your health care provider. This will help with discomfort.  Use nasal saline washes as often as told by your health care provider.  Wash your hands often with soap and water to reduce your exposure to germs. If soap and water are not available, use hand .  Do not smoke. Avoid being around people who are smoking (secondhand smoke).  Keep all follow-up visits. This is important.  Contact a health care provider if:  You have a fever.  Your symptoms get worse.  Your symptoms do not improve within 10 days.  Get help right away if:  You have a severe headache.  You have persistent vomiting.  You have severe pain or swelling around your face or eyes.  You have vision problems.  You develop confusion.  Your neck is stiff.  You have trouble breathing.  These symptoms may be an emergency. Get help right away. Call 911.  Do not wait to see if the symptoms will go away.  Do not drive yourself to the hospital.  Summary  A sinus infection is soreness and inflammation of your sinuses. Sinuses are hollow spaces in the bones around your face.  This condition is caused by nasal tissues that become inflamed or swollen. The swelling traps or blocks the flow of mucus. This allows bacteria, viruses, and fungi to grow, which leads to infection.  If you were prescribed an antibiotic medicine, take it as told by your health care provider. Do not stop taking the antibiotic even if you start to feel better.  Keep all follow-up visits. This is important.  This information is not intended to replace advice given to you by your health care provider. Make sure you discuss any questions you have with your health  care provider.  Document Revised: 11/22/2022 Document Reviewed: 11/22/2022  Elsevier Patient Education © 2023 [employer]  Upper Respiratory Infection, Adult  An upper respiratory infection (URI) is a common viral infection of the nose, throat, and upper air passages that lead to the lungs. The most common type of URI is the common cold. URIs usually get better on their own, without medical treatment.  What are the causes?  A URI is caused by a virus. You may catch a virus by:  Breathing in droplets from an infected person's cough or sneeze.  Touching something that has been exposed to the virus (is contaminated) and then touching your mouth, nose, or eyes.  What increases the risk?  You are more likely to get a URI if:  You are very young or very old.  You have close contact with others, such as at work, school, or a health care facility.  You smoke.  You have long-term (chronic) heart or lung disease.  You have a weakened disease-fighting system (immune system).  You have nasal allergies or asthma.  You are experiencing a lot of stress.  You have poor nutrition.  What are the signs or symptoms?  A URI usually involves some of the following symptoms:  Runny or stuffy (congested) nose.  Cough.  Sneezing.  Sore throat.  Headache.  Fatigue.  Fever.  Loss of appetite.  Pain in your forehead, behind your eyes, and over your cheekbones (sinus pain).  Muscle aches.  Redness or irritation of the eyes.  Pressure in the ears or face.  How is this diagnosed?  This condition may be diagnosed based on your medical history and symptoms, and a physical exam. Your health care provider may use a swab to take a mucus sample from your nose (nasal swab). This sample can be tested to determine what virus is causing the illness.  How is this treated?  URIs usually get better on their own within 7-10 days. Medicines cannot cure URIs, but your health care provider may recommend certain medicines to help relieve symptoms, such  as:  Over-the-counter cold medicines.  Cough suppressants. Coughing is a type of defense against infection that helps to clear the respiratory system, so take these medicines only as recommended by your health care provider.  Fever-reducing medicines.  Follow these instructions at home:  Activity  Rest as needed.  If you have a fever, stay home from work or school until your fever is gone or until your health care provider says your URI cannot spread to other people (is no longer contagious). Your health care provider may have you wear a face mask to prevent your infection from spreading.  Relieving symptoms  Gargle with a mixture of salt and water 3-4 times a day or as needed. To make salt water, completely dissolve ½-1 tsp (3-6 g) of salt in 1 cup (237 mL) of warm water.  Use a cool-mist humidifier to add moisture to the air. This can help you breathe more easily.  Eating and drinking    Drink enough fluid to keep your urine pale yellow.  Eat soups and other clear broths.  General instructions    Take over-the-counter and prescription medicines only as told by your health care provider. These include cold medicines, fever reducers, and cough suppressants.  Do not use any products that contain nicotine or tobacco. These products include cigarettes, chewing tobacco, and vaping devices, such as e-cigarettes. If you need help quitting, ask your health care provider.  Stay away from secondhand smoke.  Stay up to date on all immunizations, including the yearly (annual) flu vaccine.  Keep all follow-up visits. This is important.  How to prevent the spread of infection to others  URIs can be contagious. To prevent the infection from spreading:  Wash your hands with soap and water for at least 20 seconds. If soap and water are not available, use hand .  Avoid touching your mouth, face, eyes, or nose.  Cough or sneeze into a tissue or your sleeve or elbow instead of into your hand or into the air.    Contact a  health care provider if:  You are getting worse instead of better.  You have a fever or chills.  Your mucus is brown or red.  You have yellow or brown discharge coming from your nose.  You have pain in your face, especially when you bend forward.  You have swollen neck glands.  You have pain while swallowing.  You have white areas in the back of your throat.  Get help right away if:  You have shortness of breath that gets worse.  You have severe or persistent:  Headache.  Ear pain.  Sinus pain.  Chest pain.  You have chronic lung disease along with any of the following:  Making high-pitched whistling sounds when you breathe, most often when you breathe out (wheezing).  Prolonged cough (more than 14 days).  Coughing up blood.  A change in your usual mucus.  You have a stiff neck.  You have changes in your:  Vision.  Hearing.  Thinking.  Mood.  These symptoms may be an emergency. Get help right away. Call 911.  Do not wait to see if the symptoms will go away.  Do not drive yourself to the hospital.  Summary  An upper respiratory infection (URI) is a common infection of the nose, throat, and upper air passages that lead to the lungs.  A URI is caused by a virus.  URIs usually get better on their own within 7-10 days.  Medicines cannot cure URIs, but your health care provider may recommend certain medicines to help relieve symptoms.  This information is not intended to replace advice given to you by your health care provider. Make sure you discuss any questions you have with your health care provider.  Document Revised: 07/20/2022 Document Reviewed: 07/20/2022  Elsevier Patient Education © 2023 [employer]

## 2023-12-21 NOTE — TELEPHONE ENCOUNTER
PATIENT DOES NOT HAVE TRANSPORTATION TO MAKE AN APPT, AND NO VIDEO VISITS AVAILABLE     Caller: Yana Araya    Relationship: Self    Best call back number:     [phone] (Mobile)       What medication are you requesting:     What are your current symptoms: ACHY , CHILLS, SINUS ISSUES, HEADACHE, COUGH, DRAINAGE     How long have you been experiencing symptoms:  SINCE LAST NIGHT    Have you had these symptoms before:    [] Yes  [] No    Have you been treated for these symptoms before:   [] Yes  [] No    If a prescription is needed, what is your preferred pharmacy and phone number: [facility] - [place], [place] - [address]  - [phone] [facility] [phone]      Additional notes:

## 2023-12-22 ENCOUNTER — TELEPHONE (OUTPATIENT)
Dept: FAMILY MEDICINE CLINIC | Facility: CLINIC | Age: 58
End: 2023-12-22
Payer: MEDICAID

## 2023-12-22 NOTE — TELEPHONE ENCOUNTER
Caller: Yana Araya    Relationship to patient: Self    Best call back number: [phone]     Date of positive COVID19 test: 12/22/23    Date of possible COVID19 exposure:     COVID19 symptoms: COUGH, CONGESTION, BODY ACHES    Date of initial quarantine: 12/22/23    Additional information or concerns:     What is the [address] [place] [address] [facility] [address] - [phone] [facility] [phone] FX

## 2023-12-28 ENCOUNTER — TELEPHONE (OUTPATIENT)
Dept: FAMILY MEDICINE CLINIC | Facility: CLINIC | Age: 58
End: 2023-12-28
Payer: MEDICAID

## 2024-01-16 ENCOUNTER — HOSPITAL ENCOUNTER (OUTPATIENT)
Dept: ULTRASOUND IMAGING | Facility: HOSPITAL | Age: 59
Discharge: HOME | End: 2024-01-16
Payer: MEDICAID

## 2024-01-16 ENCOUNTER — TELEPHONE (OUTPATIENT)
Dept: FAMILY MEDICINE CLINIC | Facility: CLINIC | Age: 59
End: 2024-01-16
Payer: MEDICAID

## 2024-01-16 DIAGNOSIS — E04.2 MULTIPLE THYROID NODULES: Primary | ICD-10-CM

## 2024-01-16 DIAGNOSIS — Z12.31 BREAST CANCER SCREENING BY MAMMOGRAM: ICD-10-CM

## 2024-01-16 PROCEDURE — 93880 EXTRACRANIAL BILAT STUDY: CPT

## 2024-01-16 PROCEDURE — 93922 UPR/L XTREMITY ART 2 LEVELS: CPT

## 2024-01-17 NOTE — TELEPHONE ENCOUNTER
She needs the following:    Mammogram.  Thyroid US.    CT chest low dose lung cancer screening.  It was approved by her insurance at [facility]  Appointment with endocrinology.  This was ordered in December 2023.  Appointment with vascular surgery.  This was ordered in August 2023.

## 2024-01-31 ENCOUNTER — HOSPITAL ENCOUNTER (OUTPATIENT)
Dept: ULTRASOUND IMAGING | Facility: HOSPITAL | Age: 59
Discharge: HOME | End: 2024-01-31
Payer: MEDICAID

## 2024-01-31 ENCOUNTER — HOSPITAL ENCOUNTER (OUTPATIENT)
Dept: MAMMOGRAPHY | Facility: HOSPITAL | Age: 59
Discharge: HOME | End: 2024-01-31
Payer: MEDICAID

## 2024-01-31 DIAGNOSIS — E04.2 MULTIPLE THYROID NODULES: ICD-10-CM

## 2024-01-31 DIAGNOSIS — Z12.31 BREAST CANCER SCREENING BY MAMMOGRAM: ICD-10-CM

## 2024-01-31 PROCEDURE — 77063 BREAST TOMOSYNTHESIS BI: CPT

## 2024-01-31 PROCEDURE — 77067 SCR MAMMO BI INCL CAD: CPT

## 2024-01-31 PROCEDURE — 76536 US EXAM OF HEAD AND NECK: CPT

## 2024-02-01 PROBLEM — E04.2 MULTINODULAR THYROID: Status: ACTIVE | Noted: 2024-02-01

## 2024-02-01 NOTE — PROGRESS NOTES
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    She has a dominant 1.1 cm TI-RADS category 4 nodule in the left lower thyroid pole near the isthmus.    She needs a follow-up ultrasound in 1, 2, 3 and 5 years

## 2024-03-15 ENCOUNTER — OFFICE VISIT (OUTPATIENT)
Dept: FAMILY MEDICINE CLINIC | Facility: CLINIC | Age: 59
End: 2024-03-15
Payer: MEDICAID

## 2024-03-15 VITALS
RESPIRATION RATE: 20 BRPM | HEIGHT: 65 IN | DIASTOLIC BLOOD PRESSURE: 67 MMHG | OXYGEN SATURATION: 98 % | TEMPERATURE: 97.9 F | HEART RATE: 82 BPM | WEIGHT: 164.2 LBS | SYSTOLIC BLOOD PRESSURE: 95 MMHG | BODY MASS INDEX: 27.36 KG/M2

## 2024-03-15 DIAGNOSIS — I25.10 CORONARY ARTERY DISEASE INVOLVING NATIVE CORONARY ARTERY OF NATIVE HEART WITHOUT ANGINA PECTORIS: ICD-10-CM

## 2024-03-15 DIAGNOSIS — E11.40 TYPE 2 DIABETES MELLITUS WITH DIABETIC NEUROPATHY, WITH LONG-TERM CURRENT USE OF INSULIN: Primary | ICD-10-CM

## 2024-03-15 DIAGNOSIS — J43.9 PULMONARY EMPHYSEMA, UNSPECIFIED EMPHYSEMA TYPE: ICD-10-CM

## 2024-03-15 DIAGNOSIS — I73.9 PVD (PERIPHERAL VASCULAR DISEASE): ICD-10-CM

## 2024-03-15 DIAGNOSIS — I48.92 ATRIAL FLUTTER, PAROXYSMAL: ICD-10-CM

## 2024-03-15 DIAGNOSIS — E78.5 DYSLIPIDEMIA: ICD-10-CM

## 2024-03-15 DIAGNOSIS — Z79.4 TYPE 2 DIABETES MELLITUS WITH DIABETIC NEUROPATHY, WITH LONG-TERM CURRENT USE OF INSULIN: Primary | ICD-10-CM

## 2024-03-15 DIAGNOSIS — Z91.148 NONCOMPLIANCE WITH MEDICATION REGIMEN: ICD-10-CM

## 2024-03-15 DIAGNOSIS — J44.1 COPD WITH EXACERBATION: ICD-10-CM

## 2024-03-15 DIAGNOSIS — R91.1 LUNG NODULE: ICD-10-CM

## 2024-03-15 LAB
EXPIRATION DATE: ABNORMAL
HBA1C MFR BLD: 15 % (ref 4.5–5.7)
Lab: ABNORMAL

## 2024-03-15 RX ORDER — CEFDINIR 300 MG/1
300 CAPSULE ORAL EVERY 12 HOURS
Qty: 20 CAPSULE | Refills: 0 | Status: SHIPPED | OUTPATIENT
Start: 2024-03-15 | End: 2024-03-25

## 2024-03-15 RX ORDER — ISOSORBIDE MONONITRATE 30 MG/1
30 TABLET, EXTENDED RELEASE ORAL DAILY
COMMUNITY
Start: 2024-03-11

## 2024-03-15 NOTE — PROGRESS NOTES
"Chief Complaint  Diabetes, Hyperlipidemia, Peripheral Vascular Disease, and Results (Discuss CT lung cancer screening)    Yolande Millan is a 58 y.o. female  who presents to [facility]     Patient Care Team:  Melania Damico APRN as PCP - General (Family Medicine)  Darian Babcock MD as Cardiologist (Interventional Cardiology)     History of Present Illness  Patient presents for follow-up of diabetes  This is an established problem  Interim treatment changes:  Increased Lantus to 35 units daily and increased Metformin 500 mg BID to 1000 mg BID on 12/15/2023  She stopped the metformin due to nausea.  States it \"made me sick\".  She states she takes the insulin when she remembers. States she takes Lantus maybe 2 days per week.  Current medications: Jardiance 25 mg daily and Lantus 30 units daily when she remembers.  Checking blood sugar at home? Yes, when she thinks about it.  She checks sugar maybe once or twice a week  Hgba1c 14 % on 8/23/2023  Diabetic eye exam: 10/4/2023    ++++++++++++++++++++++++    Patient presents for follow-up of hyperlipidemia  This is an established problem  Interim treatment changes: none  Current medications: atorvastatin 80 mg daily  Lipid panel 8/23/2023    ++++++++++++++++++++++++      Patient presents for follow-up of CAD and PVD  This is an established problem  Interim treatment changes: none  Current medications: Eliquis, Plavix  She was referred to vascular surgeon.  She was not able to make the appointment.  She saw her cardiologist on 12/18/2023. Per his office note, he started her on atenolol and Imdur and if she fails to respond to medical therapy, will proceed with cardiac catheterization.  She is not taking the atenolol due to concern of low blood pressure.       Yana Araya  has a past medical history of Anxiety, CAD (coronary artery disease), Depression, Diabetes, Diabetic retinopathy (08/22/2023), Difficulty " walking, Dyslipidemia, Fibromyalgia, History of inferior wall myocardial infarction, Hyperlipidemia, MI (myocardial infarction), Multinodular thyroid (02/01/2024), Neuropathy in diabetes, Orthostatic hypotension, Paroxysmal atrial flutter, Peripheral vascular disease, Pulmonary emphysema (3/17/2024), and Sleep apnea.      Review of Systems   Constitutional:  Positive for fatigue. Negative for fever.   Respiratory:  Positive for cough.    Cardiovascular:  Negative for chest pain, palpitations and leg swelling.   Gastrointestinal:  Negative for abdominal pain, nausea and vomiting.        Family History   Problem Relation Age of Onset    Diabetes Mother     Cancer Mother     No Known Problems Father     Heart disease Sister     Breast cancer Maternal Aunt     Breast cancer Paternal Aunt     Ovarian cancer Cousin         Past Surgical History:   Procedure Laterality Date    CATARACT EXTRACTION Bilateral 10/23/2023    and 10/30/2023    COLONOSCOPY  04/25/2014    polyps; Dr. Welsh    COLONOSCOPY N/A 12/14/2023    Procedure: COLONOSCOPY with cold forcep biopsy x2 areas and hot snare polypectomy x1;  Surgeon: Toribio Welsh MD;  Location: [facility] ENDOSCOPY;  Service: Gastroenterology;  Laterality: N/A;  Post-colon polyp    CORONARY STENT PLACEMENT  06/09/2021    FOOT SURGERY Left 06/28/2022    Left first MTP J arthroplasty without implant/Ochoa arthroplasty; Plantar left great toe ulceration debridement with skin graft application; Dr. Tyree Velasco at [facility]    HYSTERECTOMY  04/23/2002    Dr. Casillas; due to abnormal cervical cells and heavy bleeding    SALIVARY GLAND EXCISION      TONSILLECTOMY          Social History     Socioeconomic History    Marital status:     Number of children: 2    Highest education level: 11th grade   Tobacco Use    Smoking status: Every Day     Current packs/day: 1.00     Average packs/day: 1 pack/day for 40.2 years (40.2 ttl pk-yrs)     Types: Cigarettes     Start date:  1984    Smokeless tobacco: Never   Vaping Use    Vaping status: Never Used   Substance and Sexual Activity    Alcohol use: Never    Drug use: Never    Sexual activity: Not Currently     Partners: Male     Birth control/protection: Hysterectomy        Immunization History   Administered Date(s) Administered    Pneumococcal Conjugate 20-Valent (PCV20) 08/23/2023    Tdap 03/02/2023       Objective       Current Outpatient Medications:     acetaminophen (TYLENOL) 500 MG tablet, Take 2 tablets by mouth Every 6 (Six) Hours As Needed for Mild Pain., Disp: , Rfl:     albuterol sulfate  (90 Base) MCG/ACT inhaler, 1 to 2 puffs inhalation every 4-6 hours as needed for cough and shortness of breath, Disp: 18 g, Rfl: 2    apixaban (ELIQUIS) 5 MG tablet tablet, Take 1 tablet by mouth Every 12 (Twelve) Hours., Disp: 60 tablet, Rfl: 3    atorvastatin (LIPITOR) 80 MG tablet, Take 1 tablet by mouth Daily., Disp: 90 tablet, Rfl: 3    Blood Glucose Monitoring Suppl w/Device kit, 1 Device Daily. DX E11.9, Disp: 1 each, Rfl: 0    clopidogrel (PLAVIX) 75 MG tablet, Take 1 tablet by mouth Daily., Disp: , Rfl:     empagliflozin (JARDIANCE) 25 MG tablet tablet, Take 1 tablet by mouth Daily., Disp: 90 tablet, Rfl: 1    glucose blood test strip, Blood sugar twice daily diabetes E11.9, Disp: 100 each, Rfl: 12    isosorbide mononitrate (IMDUR) 30 MG 24 hr tablet, Take 1 tablet by mouth Daily., Disp: , Rfl:     Lancets misc, 1 container 2 (Two) Times a Day. DX E11.9, Disp: 100 each, Rfl: 2    nystatin (MYCOSTATIN) 674448 UNIT/GM cream, Apply 1 application  topically to the appropriate area as directed 2 (Two) Times a Day., Disp: 60 g, Rfl: 1    PARoxetine (PAXIL) 40 MG tablet, Take 1 tablet by mouth Daily., Disp: 90 tablet, Rfl: 1    cefdinir (OMNICEF) 300 MG capsule, Take 1 capsule by mouth Every 12 (Twelve) Hours for 10 days., Disp: 20 capsule, Rfl: 0    Insulin Glargine (LANTUS SOLOSTAR) 100 UNIT/ML injection pen, Inject 35 Units under  "the skin into the appropriate area as directed Daily. (Patient not taking: Reported on 3/15/2024), Disp: , Rfl:     metFORMIN (GLUCOPHAGE) 1000 MG tablet, Take 1 tablet by mouth 2 (Two) Times a Day With Meals. (Patient not taking: Reported on 3/15/2024), Disp: 180 tablet, Rfl: 1    Vital Signs:      BP 95/67   Pulse 82   Temp 97.9 °F (36.6 °C) (Temporal)   Resp 20   Ht 165.1 cm (65\")   Wt 74.5 kg (164 lb 3.2 oz)   SpO2 98%   BMI 27.32 kg/m²     Vitals:    03/15/24 1025   BP: 95/67   Pulse: 82   Resp: 20   Temp: 97.9 °F (36.6 °C)   TempSrc: Temporal   SpO2: 98%   Weight: 74.5 kg (164 lb 3.2 oz)   Height: 165.1 cm (65\")      Physical Exam  Vitals reviewed.   Constitutional:       General: She is not in acute distress.     Appearance: Normal appearance.   HENT:      Head: Normocephalic and atraumatic.      Right Ear: Tympanic membrane, ear canal and external ear normal.      Left Ear: Tympanic membrane, ear canal and external ear normal.      Mouth/Throat:      Mouth: Mucous membranes are moist.      Pharynx: Oropharynx is clear.   Eyes:      General: No scleral icterus.     Conjunctiva/sclera: Conjunctivae normal.   Cardiovascular:      Rate and Rhythm: Normal rate and regular rhythm.      Heart sounds: Normal heart sounds.   Pulmonary:      Effort: Pulmonary effort is normal. No respiratory distress.      Breath sounds: Normal breath sounds. No wheezing.   Musculoskeletal:      Left shoulder: Normal.      Cervical back: Neck supple.      Right lower leg: No edema.      Left lower leg: No edema.   Lymphadenopathy:      Cervical: No cervical adenopathy.   Skin:     General: Skin is warm and dry.   Neurological:      Mental Status: She is alert and oriented to person, place, and time.   Psychiatric:         Mood and Affect: Mood normal.         Behavior: Behavior normal.        Result Review :   The following data was reviewed by: MAGNUS Ross on 03/15/2024:  A1C Last 3 Results          8/23/2023    " 08:43 12/15/2023    14:20 3/15/2024    11:19   HGBA1C Last 3 Results   Hemoglobin A1C 14  13.1  15.0               LUNG CANCER SCREENING     Date of Exam: 3/12/2024 13:29 EDT     Indication: Current smoker 37 pack year history.     Comparison: None available.     Technique: Low dose CT imaging of the chest was performed without intravenous contrast enhancement. Automated exposure control and iterative reconstruction methods were used. Radiation audit for number of CT and nuclear cardiology exams performed in the last year: 1.     Findings:  There is moderate emphysema. Faint patchy groundglass and tree-in-bud opacities is present in the right upper lobe. There is linear scarring versus plate atelectasis in the right middle lobe with an adjacent stellate groundglass nodule measuring 5 mm. No solid appearing nodules are identified in either lung. No suspicious consolidations. Scar versus plate atelectasis is present in the lingula. No bronchiectasis or abnormal bronchial wall thickening. The central tracheobronchial tree is widely patent. No pleural or pericardial effusions or pneumothorax.     Coronary artery calcifications are present. The thoracic aorta is not aneurysmal. There is mild cardiomegaly. The unenhanced pulmonary arteries are normal in caliber. No pathologically enlarged thoracic or axillary lymph nodes. Thyroid gland is normal in size.     Limited images of the upper abdomen demonstrate no acute or suspicious abnormality. Visualized skeletal structures are intact. No aggressive focal osseous lesions or acute bony abnormalities.     Impression:  Patchy groundglass and tiny tree-in-bud opacity within the left upper lobe is compatible with endobronchial infection or inflammatory process. There is underlying emphysema. No suspicious findings to indicate malignancy in the chest. Juxtapleural nodule: <10 mm (524 mm3) mean diameter at baseline or new AND Solid; smooth margins; and oval, lentiform, or  triangular shape; OR  Solid nodule: <6 mm (<113 mm3) at baseline OR New nodule <4 mm (<34 mm3); OR  Part-solid nodule: <6 mm total mean diameter (<113 mm3) on baseline OR Non-solid nodule (GGN): <30 mm (<14,137 mm3) at baseline, new or growing OR =30 mm (=14,137 mm3) stable or slowly growing; OR  Airway nodule: Subsegmental at baseline, new, or stable; OR  Category 3 nodule that is stable or decreased in size at 6-month follow-up CT, OR  Category 3 or 4A nodules that resolve on follow-up, OR  Category 4B findings proven to be benign in etiology following appropriate diagnostic workup        Recommendation:  Continue annual screening with LDCT there are no prior studies for comparison, correlation with clinical scenario of any signs and symptoms of infection is recommended.     Lung Rads Assessment:  Lung-RADS L2 - Benign appearance or <1% chance of malignancy.         Electronically Signed: Buck Starks DO  3/13/2024 11:45 EDT  Workstation ID: [id number]       Assessment and Plan    Diagnoses and all orders for this visit:    1. Type 2 diabetes mellitus with diabetic neuropathy, with long-term current use of insulin (Primary)  Overview:  Hgba1c 14 % on 8/23/2023  Hgba1c 13.1 % on 12/15/2023  Hgba1c 15 % on 3/15/2024    Assessment & Plan:  Chronic problem.  Uncontrolled and worsening.  She stopped metformin due to gi side effects and is not taking Lantus routinely.    Discussed sequela of uncontrolled diabetes: blindness, heart attack, stroke, renal failure and death.  She verbalized understanding of uncontrolled diabetes.  Discussed ways to help remember to take her medications as prescribed.     Restart Lantus 35 units daily.    Restart metformin 1000 mg 1/2 tablet daily and if tolerated increase to BID  Continue Jardiance 25 mg daily.    Refer to endocrinology    Orders:  -     Ambulatory Referral to Endocrinology  -     POC Glycosylated Hemoglobin (Hb A1C)  -     Microalbumin / Creatinine Urine Ratio -  Urine, Clean Catch    2. Dyslipidemia  Assessment & Plan:  Chronic stable problem.  Continue atorvastatin 80 mg daily.  Recheck fasting lipid panel in August 2024      3. PVD (peripheral vascular disease)  Overview:  Per cardiology note - She had a CTA with run off. This documents CTA with borderline multifocal stenosis in the L common iliac artery, R > L with diffuse SFA disease, three vessel run off bilaterally, severe nolvia superficial varicosites the majority appeared to be arising from the bilateral greater saphenous veins, the right common femoral artery may represent dissection flap vs. Residual chronic thrombus. Dr. Menard spoke with Dr. Babcock. She is suppose to have surgery but Medicaid will not approve.        Assessment & Plan:  Refer to vascular surgery, Dr. Damian and/or Dr. Valente    Orders:  -     Ambulatory Referral to Vascular Surgery    4. Coronary artery disease involving native coronary artery of native heart without angina pectoris  Assessment & Plan:  Call Dr. Babcock's office for follow up      5. Atrial flutter, paroxysmal  Overview:  Per cardiology note - occurred post MI. Arrhythmia has not been documented in recurrence. She remains on oral anticoagulation due to elevated cardiac embolic risk score.      Assessment & Plan:  Chronic stable problem.   Continue Eliquis 5 mg BID.  Follow up with cardiology      6. Pulmonary emphysema, unspecified emphysema type  Comments:  Refer to pulmonology  Orders:  -     Ambulatory Referral to Pulmonology    7. Lung nodule  Comments:  Refer to pulmonology  Orders:  -     Ambulatory Referral to Pulmonology    8. COPD with exacerbation  Comments:  Begin antibiotic  Orders:  -     cefdinir (OMNICEF) 300 MG capsule; Take 1 capsule by mouth Every 12 (Twelve) Hours for 10 days.  Dispense: 20 capsule; Refill: 0    9. Noncompliance with medication regimen  Assessment & Plan:  Discussed need to take medications as prescribed.             Follow Up   Return  in 4 weeks (on 4/12/2024) for Annual physical.  Patient was given instructions and counseling regarding her condition or for health maintenance advice. Please see specific information pulled into the AVS if appropriate.    Patient Instructions   I have placed referrals to     Endocrinology, Dr. Wolfe for diabetes.  Someone will call you to set this up.    Vascular Surgeon, Dr. Damian, for vascular disease.  You have been scheduled on 4/22/2024 at 11:30 am   Pulmonology - for emphysema and lung nodule.  Someone will call you to set this up.    You need to call Dr. Babcock to see about getting the appointment for the heart cath.    Take Insulin Daily    Take metformin 1000 mg 1/2 tab daily.  If tolerated, then increase to 1/2 tab twice daily

## 2024-03-15 NOTE — PATIENT INSTRUCTIONS
I have placed referrals to     Endocrinology, Dr. Wolfe for diabetes.  Someone will call you to set this up.    Vascular Surgeon, Dr. Damian, for vascular disease.  You have been scheduled on 4/22/2024 at 11:30 am   Pulmonology - for emphysema and lung nodule.  Someone will call you to set this up.    You need to call Dr. Babcock to see about getting the appointment for the heart cath.    Take Insulin Daily    Take metformin 1000 mg 1/2 tab daily.  If tolerated, then increase to 1/2 tab twice daily

## 2024-03-16 LAB
ALBUMIN/CREAT UR: 10 MG/G CREAT (ref 0–29)
CREAT UR-MCNC: 30.4 MG/DL
MICROALBUMIN UR-MCNC: 3.1 UG/ML

## 2024-03-17 PROBLEM — Z91.148 NONCOMPLIANCE WITH MEDICATION REGIMEN: Status: ACTIVE | Noted: 2024-03-17

## 2024-03-17 PROBLEM — R91.1 LUNG NODULE: Status: ACTIVE | Noted: 2024-03-17

## 2024-03-17 PROBLEM — J43.9 PULMONARY EMPHYSEMA: Status: ACTIVE | Noted: 2024-03-17

## 2024-03-18 NOTE — ASSESSMENT & PLAN NOTE
Chronic problem.  Uncontrolled and worsening.  She stopped metformin due to gi side effects and is not taking Lantus routinely.    Discussed sequela of uncontrolled diabetes: blindness, heart attack, stroke, renal failure and death.  She verbalized understanding of uncontrolled diabetes.  Discussed ways to help remember to take her medications as prescribed.     Restart Lantus 35 units daily.    Restart metformin 1000 mg 1/2 tablet daily and if tolerated increase to BID  Continue Jardiance 25 mg daily.    Refer to endocrinology

## 2024-04-02 RX ORDER — APIXABAN 5 MG/1
5 TABLET, FILM COATED ORAL EVERY 12 HOURS
Qty: 60 TABLET | Refills: 0 | Status: SHIPPED | OUTPATIENT
Start: 2024-04-02

## 2024-04-08 ENCOUNTER — OFFICE VISIT (OUTPATIENT)
Dept: PULMONOLOGY | Facility: HOSPITAL | Age: 59
End: 2024-04-08
Payer: MEDICAID

## 2024-04-08 VITALS
RESPIRATION RATE: 14 BRPM | HEIGHT: 65 IN | WEIGHT: 162.2 LBS | DIASTOLIC BLOOD PRESSURE: 67 MMHG | HEART RATE: 87 BPM | BODY MASS INDEX: 27.02 KG/M2 | OXYGEN SATURATION: 97 % | SYSTOLIC BLOOD PRESSURE: 103 MMHG

## 2024-04-08 DIAGNOSIS — J44.9 CHRONIC OBSTRUCTIVE PULMONARY DISEASE, UNSPECIFIED COPD TYPE: Primary | ICD-10-CM

## 2024-04-08 PROCEDURE — G0463 HOSPITAL OUTPT CLINIC VISIT: HCPCS

## 2024-04-08 RX ORDER — ATENOLOL 25 MG/1
25 TABLET ORAL DAILY
COMMUNITY

## 2024-04-08 RX ORDER — AZITHROMYCIN 250 MG/1
TABLET, FILM COATED ORAL
Qty: 6 TABLET | Refills: 0 | Status: SHIPPED | OUTPATIENT
Start: 2024-04-08

## 2024-04-08 NOTE — PROGRESS NOTES
PULMONARY/ CRITICAL CARE/ SLEEP MEDICINE OUTPATIENT CONSULT/ FOLLOW UP NOTE        Patient Name:  Yana Araya    :  1965    Medical Record:  [id number]    PRIMARY CARE PHYSICIAN     Melania Damico APRN    REASON FOR CONSULTATION    Yana Araya is a 58 y.o. female who is referred for consultation for COPD  REVIEW OF SYSTEMS    Constitutional:  Denies fever or chills   Eyes:  Denies change in visual acuity   HENT:  Denies nasal congestion or sore throat   Respiratory:  Denies cough or shortness of breath   Cardiovascular:  Denies chest pain or edema   GI:  Denies abdominal pain, nausea, vomiting, bloody stools or diarrhea   :  Denies dysuria   Musculoskeletal:  Denies back pain or joint pain   Integument:  Denies rash   Neurologic:  Denies headache, focal weakness or sensory changes   Endocrine:  Denies polyuria or polydipsia   Lymphatic:  Denies swollen glands   Psychiatric:  Denies depression or anxiety     MEDICAL HISTORY    Past Medical History:   Diagnosis Date    Anxiety     CAD (coronary artery disease)     Depression     Diabetes     Diabetic retinopathy 2023    Type 2 diabetes mellitus with right eye affected by mild nonproliferative retinopathy without macular edema,    Difficulty walking     Dyslipidemia     Fibromyalgia     History of inferior wall myocardial infarction     Hyperlipidemia     MI (myocardial infarction)     s/p STEMI 2021    Multinodular thyroid 2024    Neuropathy in diabetes     Orthostatic hypotension     Paroxysmal atrial flutter     Peripheral vascular disease     Pulmonary emphysema 3/17/2024    Sleep apnea         SURGICAL HISTORY    Past Surgical History:   Procedure Laterality Date    CATARACT EXTRACTION Bilateral 10/23/2023    and 10/30/2023    COLONOSCOPY  2014    polyps; Dr. Welsh    COLONOSCOPY N/A 2023    Procedure: COLONOSCOPY with cold forcep biopsy x2 areas and hot snare polypectomy x1;  Surgeon: Toribio Welsh MD;   Location: [facility] ENDOSCOPY;  Service: Gastroenterology;  Laterality: N/A;  Post-colon polyp    CORONARY STENT PLACEMENT  06/09/2021    FOOT SURGERY Left 06/28/2022    Left first MTP J arthroplasty without implant/Ochoa arthroplasty; Plantar left great toe ulceration debridement with skin graft application; Dr. Tyree Velasco at [facility]    HYSTERECTOMY  04/23/2002    Dr. Casillas; due to abnormal cervical cells and heavy bleeding    SALIVARY GLAND EXCISION      TONSILLECTOMY          FAMILY HISTORY    Family History   Problem Relation Age of Onset    Diabetes Mother     Cancer Mother     No Known Problems Father     Heart disease Sister     Breast cancer Maternal Aunt     Breast cancer Paternal Aunt     Ovarian cancer Cousin        SOCIAL HISTORY    Social History     Tobacco Use    Smoking status: Every Day     Current packs/day: 1.00     Average packs/day: 1 pack/day for 40.3 years (40.3 ttl pk-yrs)     Types: Cigarettes     Start date: 1984     Passive exposure: Current    Smokeless tobacco: Never   Substance Use Topics    Alcohol use: Never        ALLERGIES    Allergies   Allergen Reactions    Penicillins Unknown - High Severity and Confusion     Other reaction(s): feel funny    Sulfa Antibiotics Myalgia         MEDICATIONS    Current Outpatient Medications on File Prior to Visit   Medication Sig Dispense Refill    acetaminophen (TYLENOL) 500 MG tablet Take 2 tablets by mouth Every 6 (Six) Hours As Needed for Mild Pain.      albuterol sulfate  (90 Base) MCG/ACT inhaler 1 to 2 puffs inhalation every 4-6 hours as needed for cough and shortness of breath 18 g 2    atenolol (TENORMIN) 25 MG tablet Take 1 tablet by mouth Daily.      atorvastatin (LIPITOR) 80 MG tablet Take 1 tablet by mouth Daily. 90 tablet 3    Blood Glucose Monitoring Suppl w/Device kit 1 Device Daily. DX E11.9 1 each 0    clopidogrel (PLAVIX) 75 MG tablet Take 1 tablet by mouth Daily.      Eliquis 5 MG tablet tablet TAKE 1 TABLET  "BY MOUTH EVERY 12 HOURS 60 tablet 0    empagliflozin (JARDIANCE) 25 MG tablet tablet Take 1 tablet by mouth Daily. 90 tablet 1    glucose blood test strip Blood sugar twice daily diabetes E11.9 100 each 12    Insulin Glargine (LANTUS SOLOSTAR) 100 UNIT/ML injection pen Inject 35 Units under the skin into the appropriate area as directed Daily. (Patient not taking: Reported on 3/15/2024)      isosorbide mononitrate (IMDUR) 30 MG 24 hr tablet Take 1 tablet by mouth Daily.      Lancets misc 1 container 2 (Two) Times a Day. DX E11.9 100 each 2    metFORMIN (GLUCOPHAGE) 1000 MG tablet Take 1 tablet by mouth 2 (Two) Times a Day With Meals. (Patient not taking: Reported on 3/15/2024) 180 tablet 1    nystatin (MYCOSTATIN) 279525 UNIT/GM cream Apply 1 application  topically to the appropriate area as directed 2 (Two) Times a Day. 60 g 1    PARoxetine (PAXIL) 40 MG tablet Take 1 tablet by mouth Daily. 90 tablet 1     No current facility-administered medications on file prior to visit.       PHYSICAL EXAM    Vitals:    04/08/24 0940   BP: 103/67   BP Location: Left arm   Patient Position: Sitting   Cuff Size: Adult   Pulse: 87   Resp: 14   SpO2: 97%   Weight: 73.6 kg (162 lb 3.2 oz)   Height: 165.1 cm (65\")        Constitutional:  Well developed, well nourished, no acute distress, non-toxic appearance   Eyes:  PERRL, conjunctiva normal   HENT:  Atraumatic, external ears normal, nose normal, oropharynx moist, no pharyngeal exudates. mallampatti   Neck- normal range of motion, no tenderness, supple   Respiratory:  No respiratory distress, normal breath sounds, no rales, no wheezing   Cardiovascular:  Normal rate, normal rhythm, no murmurs, no gallops, no rubs   GI:  Soft, nondistended, normal bowel sounds, nontender, no organomegaly, no mass, no rebound, no guarding   :  No costovertebral angle tenderness   Musculoskeletal:  No edema, no tenderness, no deformities. Back- no tenderness  Integument:  Well hydrated, no rash "   Lymphatic:  No lymphadenopathy noted   Neurologic:  Alert & oriented x 3, CN 2-12 normal, normal motor function, normal sensory function, no focal deficits noted   Psychiatric:  Speech and behavior appropriate     US Thyroid    Result Date: 2/1/2024  Impression: 1. Dominant 1.1 cm TI-RADS category 4 nodule in the left lower thyroid pole near the isthmus, for which follow-up ultrasound at 1, 2, 3 and 5 years is recommended. 2.  6 mm or less cysts or mixed cystic-solid nodules are seen elsewhere within both thyroid lobes, for which no additional follow-up is necessary TI-RADS: TR4 - Size between 1 cm and 1.5 cm. Recommend follow up thyroid ultrasound at years 1, 2, 3 and 5 of surveillance. Electronically Signed: Jacqueline Kowalski MD  2/1/2024 10:52 AM EST  Workstation ID: [id number]    Mammo Screening Digital Tomosynthesis Bilateral With CAD    Result Date: 1/31/2024  No mammographic signs of malignancy. Recommend routine mammographic screening.  BI-RADS ASSESSMENT: BI-RADS 1. Negative.  The patient's information is entered into a computerized reminder system with a targeted due date for the next mammogram.  Note:  It has been reported that there is approximately a 15% false negative rate in mammography.  Therefore, management of a palpable abnormality should not be deferred because of a negative mammogram.     Electronically Signed By-Adeola Sanches MD On:1/31/2024 12:37 PM      US Ankle / Brachial Indices Extremity Limited    Result Date: 1/16/2024  Impression: 1. On the right, findings suggest at least moderate occlusive disease at rest. Resting LISET of 0.7. 2. On the left, examination suggest mild occlusive disease at rest. Resting LISET of 0.8. Electronically Signed: Hussein Trujillo MD  1/16/2024 4:59 PM EST  Workstation ID: [id number]    US Carotid Bilateral    Result Date: 1/16/2024  Impression: 1. Mild to moderate bilateral carotid plaquing. Findings suggest a degree of stenosis of less than 50% based on consensus panel  criteria. 2. Patent vertebral arteries with antegrade flow. 3. Bilateral indeterminate hypodense thyroid nodules are incompletely imaged on this study. Please see above discussion. Electronically Signed: Hussein Trujillo MD  1/16/2024 4:57 PM EST  Workstation ID: [id number]        ASSESSMENT & PLAN:      58-year-old female with 40-pack-year history of smoking, LDCT chest on 3/12/2024 showed micronodular/tree-in-bud infiltrates right middle lobe, patient has not been feeling well for a while, denies any fever chills or night sweats but she has chronic cough    Plan  Azithromycin  Repeat LDCT chest no contrast and March 2025  PFTs    Smoking cessation    Currently on albuterol                This document has been electronically signed by  Rey Shaw MD  09:42 EDT

## 2024-05-06 ENCOUNTER — HOSPITAL ENCOUNTER (OUTPATIENT)
Dept: RESPIRATORY THERAPY | Facility: HOSPITAL | Age: 59
Discharge: HOME | End: 2024-05-06
Admitting: INTERNAL MEDICINE
Payer: MEDICAID

## 2024-05-06 VITALS — HEART RATE: 94 BPM | RESPIRATION RATE: 16 BRPM | OXYGEN SATURATION: 99 %

## 2024-05-06 DIAGNOSIS — J44.9 CHRONIC OBSTRUCTIVE PULMONARY DISEASE, UNSPECIFIED COPD TYPE: ICD-10-CM

## 2024-05-06 PROCEDURE — 94664 DEMO&/EVAL PT USE INHALER: CPT

## 2024-05-06 PROCEDURE — 94799 UNLISTED PULMONARY SVC/PX: CPT

## 2024-05-06 PROCEDURE — 94060 EVALUATION OF WHEEZING: CPT

## 2024-05-06 PROCEDURE — 94726 PLETHYSMOGRAPHY LUNG VOLUMES: CPT

## 2024-05-06 PROCEDURE — 94729 DIFFUSING CAPACITY: CPT

## 2024-05-06 RX ORDER — ALBUTEROL SULFATE 90 UG/1
2 AEROSOL, METERED RESPIRATORY (INHALATION) ONCE
Status: COMPLETED | OUTPATIENT
Start: 2024-05-06 | End: 2024-05-06

## 2024-05-06 RX ADMIN — ALBUTEROL SULFATE 2 PUFF: 108 AEROSOL, METERED RESPIRATORY (INHALATION) at 10:54

## 2024-05-12 RX ORDER — APIXABAN 5 MG/1
5 TABLET, FILM COATED ORAL EVERY 12 HOURS
Qty: 60 TABLET | Refills: 0 | OUTPATIENT
Start: 2024-05-12

## 2024-05-16 ENCOUNTER — PATIENT MESSAGE (OUTPATIENT)
Dept: FAMILY MEDICINE CLINIC | Facility: CLINIC | Age: 59
End: 2024-05-16
Payer: MEDICAID

## 2024-06-14 ENCOUNTER — TELEPHONE (OUTPATIENT)
Dept: FAMILY MEDICINE CLINIC | Facility: CLINIC | Age: 59
End: 2024-06-14
Payer: MEDICAID

## 2024-06-14 NOTE — TELEPHONE ENCOUNTER
1. Please make appointment for annual wellness exam     2. I placed a referral to endocrinology.  Please check on status of this referral.  Her last hgba1c was 15 % in March.

## 2024-06-25 ENCOUNTER — TELEPHONE (OUTPATIENT)
Dept: PULMONOLOGY | Facility: HOSPITAL | Age: 59
End: 2024-06-25
Payer: MEDICAID

## 2024-06-25 NOTE — TELEPHONE ENCOUNTER
Pt called for results of her PFT:     Impression: This pulmonary function test showing that  the patient has small airway obstructive lung disease with air trapping  Abnormality in the loop need to be correlated with clinical pictures

## 2024-06-28 ENCOUNTER — OFFICE VISIT (OUTPATIENT)
Dept: FAMILY MEDICINE CLINIC | Facility: CLINIC | Age: 59
End: 2024-06-28
Payer: MEDICAID

## 2024-06-28 VITALS
HEIGHT: 65 IN | TEMPERATURE: 97.4 F | DIASTOLIC BLOOD PRESSURE: 65 MMHG | HEART RATE: 78 BPM | WEIGHT: 170.4 LBS | SYSTOLIC BLOOD PRESSURE: 96 MMHG | BODY MASS INDEX: 28.39 KG/M2 | RESPIRATION RATE: 18 BRPM

## 2024-06-28 DIAGNOSIS — E78.5 DYSLIPIDEMIA: ICD-10-CM

## 2024-06-28 DIAGNOSIS — Z00.00 WELLNESS EXAMINATION: Primary | ICD-10-CM

## 2024-06-28 DIAGNOSIS — F33.1 MODERATE EPISODE OF RECURRENT MAJOR DEPRESSIVE DISORDER: ICD-10-CM

## 2024-06-28 DIAGNOSIS — I48.92 ATRIAL FLUTTER, PAROXYSMAL: ICD-10-CM

## 2024-06-28 DIAGNOSIS — I25.10 CORONARY ARTERY DISEASE INVOLVING NATIVE CORONARY ARTERY OF NATIVE HEART WITHOUT ANGINA PECTORIS: ICD-10-CM

## 2024-06-28 DIAGNOSIS — Z79.4 TYPE 2 DIABETES MELLITUS WITH DIABETIC NEUROPATHY, WITH LONG-TERM CURRENT USE OF INSULIN: ICD-10-CM

## 2024-06-28 DIAGNOSIS — E66.3 OVERWEIGHT (BMI 25.0-29.9): ICD-10-CM

## 2024-06-28 DIAGNOSIS — E11.40 TYPE 2 DIABETES MELLITUS WITH DIABETIC NEUROPATHY, WITH LONG-TERM CURRENT USE OF INSULIN: ICD-10-CM

## 2024-06-28 DIAGNOSIS — F17.210 TOBACCO DEPENDENCE DUE TO CIGARETTES: ICD-10-CM

## 2024-06-28 DIAGNOSIS — Z91.148 NONCOMPLIANCE WITH MEDICATION REGIMEN: ICD-10-CM

## 2024-06-28 DIAGNOSIS — I73.9 PVD (PERIPHERAL VASCULAR DISEASE): ICD-10-CM

## 2024-06-28 LAB
EXPIRATION DATE: ABNORMAL
HBA1C MFR BLD: 12.6 % (ref 4.5–5.7)
Lab: ABNORMAL

## 2024-06-28 PROCEDURE — 3078F DIAST BP <80 MM HG: CPT | Performed by: NURSE PRACTITIONER

## 2024-06-28 PROCEDURE — 3046F HEMOGLOBIN A1C LEVEL >9.0%: CPT | Performed by: NURSE PRACTITIONER

## 2024-06-28 PROCEDURE — 99396 PREV VISIT EST AGE 40-64: CPT | Performed by: NURSE PRACTITIONER

## 2024-06-28 PROCEDURE — 3074F SYST BP LT 130 MM HG: CPT | Performed by: NURSE PRACTITIONER

## 2024-06-28 PROCEDURE — 83036 HEMOGLOBIN GLYCOSYLATED A1C: CPT | Performed by: NURSE PRACTITIONER

## 2024-06-28 PROCEDURE — 1159F MED LIST DOCD IN RCRD: CPT | Performed by: NURSE PRACTITIONER

## 2024-06-28 PROCEDURE — 1160F RVW MEDS BY RX/DR IN RCRD: CPT | Performed by: NURSE PRACTITIONER

## 2024-06-28 NOTE — PATIENT INSTRUCTIONS
Reschedule appointment to see vascular surgery, Dr. Damian    See cardiology on 8/27/2024    Check blood sugar, record readings and bring to next appointment.

## 2024-06-28 NOTE — PROGRESS NOTES
Chief Complaint  Annual Exam, Diabetes, and Hyperlipidemia    Subjective          Yana is a 59 y.o. female  who presents to [facility]     Patient Care Team:  Melania Damico APRN as PCP - General (Family Medicine)  Darian Babcock MD as Cardiologist (Interventional Cardiology)  Rey Shaw MD as Consulting Physician (Pulmonary Disease)  Matt Damian MD as Surgeon (Thoracic Surgery)  Mer Alvarado (Optometry)     History of Present Illness  Adult Annual Wellness  Social History    Tobacco Use      Smoking status: Every Day        Packs/day: 1.00        Years: 1 pack/day for 40.5 years (40.5 ttl pk-yrs)        Types: Cigarettes        Start date: 1984        Passive exposure: Current      Smokeless tobacco: Never    Vaping Use      Vaping status: Never Used    Alcohol use: Never    Drug use: Never    Caffeine use - coffee, tea, soda    General health habits  Last eye exam - 6/13/2024  Last dental exam - > 3 years ago    Diet - Regular diet    Weight / BMI - overweight, BMI 28.4    Exercise - no    Hours of sleep per night ? 6-8    Safety -   Do you use seat belts consistently ? Yes   Working smoke detector in home ? Yes   Do you use sunscreen when outdoors? Yes      Reproductive health -  Sexually active - not currently  Birth control - hysterectomy    Screening/prevention  Last mammogram/ breast cancer screening - 1/31/2024  Last pap smear/ cervical cancer screening - > 3 years ago  Last DEXA scan - never  Colon cancer screening - colonoscopy 12/14/2023  CT low dose lung cancer screening - 3/12/2024    Immunizations -   Tdap - yes 3/2/2023  Pneumococcal vaccine - 8/23/2023  Shingles (Shingrix) - no       ++++++++++++++++++++++++    Patient presents for follow-up of diabetes  This is an established problem  Interim treatment changes: She is not taking medication routinely.    She was referred to endocrinology and cancelled appointment.  She states she takes  the insulin and metformin when she remembers.  She takes medicine 2 or 3 times per week.  Current medications: Jardiance 25 mg daily and Lantus 35 units daily and metformin 1000 mg BID  Checking blood sugar at home? Yes, when she thinks about it.  She checks sugar maybe once or twice a week  Hgba1c 14 % on 8/23/2023  Hgba1c 12.6 % on 6/28/2024  Diabetic eye exam: 6/13/2024      ++++++++++++++++++++++++     Patient presents for follow-up of hyperlipidemia  This is an established problem  Interim treatment changes: none  Current medications: atorvastatin 80 mg daily  Lipid panel 8/23/2023  She misses doses of atorvastatin     ++++++++++++++++++++++++      Patient presents for follow-up of CAD and PVD  This is an established problem  Interim treatment changes: none  Current medications: Eliquis, Plavix  She was referred to vascular surgeon.  She saw Dr. Damian on 4/22/2024.  She needs to make a follow up appointment.   She saw her cardiologist on 12/18/2023. Per his office note, he started her on atenolol and Imdur and if she fails to respond to medical therapy, will proceed with cardiac catheterization.  She has not called to make an appointment with cardiology yet.    ++++++++++++++++++++++++    Patient presents for follow-up of depression  This is an established problem  Interim treatment changes: she misses doses of paxil  Current medications: Paxil 40 mg daily  PHQ-9 = 10 on 6/28/2024  ROBEL-7 =  9 on 6/28/2024      She saw pulmonology, Dr. Shaw, on 4/8/2024  He ordered PFT testing        Yana Araya  has a past medical history of Anxiety, CAD (coronary artery disease), Depression, Diabetes, Diabetic retinopathy (08/22/2023), Difficulty walking, Dyslipidemia, Fibromyalgia, History of inferior wall myocardial infarction, Hyperlipidemia, MI (myocardial infarction), Multinodular thyroid (02/01/2024), Neuropathy in diabetes, Orthostatic hypotension, Paroxysmal atrial flutter, Peripheral vascular disease, Pulmonary  emphysema (3/17/2024), and Sleep apnea.      Review of Systems   Constitutional:  Positive for fatigue. Negative for fever.   HENT:  Negative for ear pain and sore throat.    Eyes:  Positive for visual disturbance (blurry).   Respiratory:  Positive for cough. Negative for shortness of breath.    Cardiovascular:  Positive for chest pain (1-2 times per week) and palpitations.   Gastrointestinal:  Positive for constipation and diarrhea. Negative for blood in stool, nausea and vomiting.   Endocrine: Negative for cold intolerance, heat intolerance, polydipsia, polyphagia and polyuria.   Genitourinary:  Negative for dysuria, hematuria and vaginal bleeding.   Skin:  Negative for rash.   Neurological:  Positive for dizziness. Negative for light-headedness.   Psychiatric/Behavioral:  Positive for dysphoric mood.         Family History   Problem Relation Age of Onset    Diabetes Mother     Cancer Mother     No Known Problems Father     Heart disease Sister     Breast cancer Maternal Aunt     Breast cancer Paternal Aunt     Ovarian cancer Cousin         Past Surgical History:   Procedure Laterality Date    CATARACT EXTRACTION Bilateral 10/23/2023    and 10/30/2023    COLONOSCOPY  04/25/2014    polyps; Dr. Welsh    COLONOSCOPY N/A 12/14/2023    Procedure: COLONOSCOPY with cold forcep biopsy x2 areas and hot snare polypectomy x1;  Surgeon: Toribio Welsh MD;  Location: [facility] END[facility]COPY;  Service: Gastroenterology;  Laterality: N/A;  Post-colon polyp    CORONARY STENT PLACEMENT  06/09/2021    FOOT SURGERY Left 06/28/2022    Left first MTP J arthroplasty without implant/Ochoa arthroplasty; Plantar left great toe ulceration debridement with skin graft application; Dr. Tyree Velasco at [facility]    HYSTERECTOMY  04/23/2002    Dr. Casillas; due to abnormal cervical cells and heavy bleeding    SALIVARY GLAND EXCISION      TONSILLECTOMY          Social History     Socioeconomic History    Marital status:     Number  of children: 2    Highest education level: 11th grade   Tobacco Use    Smoking status: Every Day     Current packs/day: 1.00     Average packs/day: 1 pack/day for 40.5 years (40.5 ttl pk-yrs)     Types: Cigarettes     Start date:      Passive exposure: Current    Smokeless tobacco: Never   Vaping Use    Vaping status: Never Used   Substance and Sexual Activity    Alcohol use: Never    Drug use: Never    Sexual activity: Not Currently     Partners: Male     Birth control/protection: Hysterectomy        Immunization History   Administered Date(s) Administered    Pneumococcal Conjugate 20-Valent (PCV20) 2023    Tdap 2023     Menstrual History:  OB History          2    Para   2    Term   2            AB   0    Living   0         SAB        IAB        Ectopic        Molar        Multiple        Live Births   0          Obstetric Comments   LMP   Menarche 13              No LMP recorded. Patient has had a hysterectomy.       Objective       Current Outpatient Medications:     acetaminophen (TYLENOL) 500 MG tablet, Take 2 tablets by mouth Every 6 (Six) Hours As Needed for Mild Pain., Disp: , Rfl:     albuterol sulfate  (90 Base) MCG/ACT inhaler, 1 to 2 puffs inhalation every 4-6 hours as needed for cough and shortness of breath, Disp: 18 g, Rfl: 2    atenolol (TENORMIN) 25 MG tablet, Take 1 tablet by mouth Daily., Disp: , Rfl:     atorvastatin (LIPITOR) 80 MG tablet, Take 1 tablet by mouth Daily., Disp: 90 tablet, Rfl: 3    Blood Glucose Monitoring Suppl w/Device kit, 1 Device Daily. DX E11.9, Disp: 1 each, Rfl: 0    clopidogrel (PLAVIX) 75 MG tablet, Take 1 tablet by mouth Daily., Disp: , Rfl:     Eliquis 5 MG tablet tablet, TAKE 1 TABLET BY MOUTH EVERY 12 HOURS, Disp: 60 tablet, Rfl: 0    empagliflozin (JARDIANCE) 25 MG tablet tablet, Take 1 tablet by mouth Daily., Disp: 90 tablet, Rfl: 1    glucose blood test strip, Blood sugar twice daily diabetes E11.9, Disp: 100 each, Rfl:  "12    Insulin Glargine (LANTUS SOLOSTAR) 100 UNIT/ML injection pen, Inject 35 Units under the skin into the appropriate area as directed Daily. (Patient taking differently: Inject 35 Units under the skin into the appropriate area as directed Daily. Sometimes), Disp: , Rfl:     Lancets misc, 1 container 2 (Two) Times a Day. DX E11.9, Disp: 100 each, Rfl: 2    metFORMIN (GLUCOPHAGE) 1000 MG tablet, Take 1 tablet by mouth 2 (Two) Times a Day With Meals. (Patient taking differently: Take 1 tablet by mouth 2 (Two) Times a Day With Meals. Sometimes), Disp: 180 tablet, Rfl: 1    nystatin (MYCOSTATIN) 724715 UNIT/GM cream, Apply 1 application  topically to the appropriate area as directed 2 (Two) Times a Day., Disp: 60 g, Rfl: 1    PARoxetine (PAXIL) 40 MG tablet, Take 1 tablet by mouth Daily., Disp: 90 tablet, Rfl: 1    isosorbide mononitrate (IMDUR) 30 MG 24 hr tablet, Take 1 tablet by mouth Daily. (Patient not taking: Reported on 6/28/2024), Disp: , Rfl:     Vital Signs:      BP 96/65   Pulse 78   Temp 97.4 °F (36.3 °C) (Temporal)   Resp 18   Ht 165.1 cm (65\")   Wt 77.3 kg (170 lb 6.4 oz)   BMI 28.36 kg/m²     Vitals:    06/28/24 1057   BP: 96/65   Pulse: 78   Resp: 18   Temp: 97.4 °F (36.3 °C)   TempSrc: Temporal   Weight: 77.3 kg (170 lb 6.4 oz)   Height: 165.1 cm (65\")      Physical Exam  Vitals reviewed.   Constitutional:       General: She is not in acute distress.     Appearance: Normal appearance.   HENT:      Head: Normocephalic and atraumatic.      Right Ear: Tympanic membrane, ear canal and external ear normal.      Left Ear: Tympanic membrane, ear canal and external ear normal.      Mouth/Throat:      Mouth: Mucous membranes are moist.      Pharynx: Oropharynx is clear.   Eyes:      General: No scleral icterus.     Conjunctiva/sclera: Conjunctivae normal.   Cardiovascular:      Rate and Rhythm: Normal rate and regular rhythm.      Heart sounds: Normal heart sounds.   Pulmonary:      Effort: Pulmonary " effort is normal. No respiratory distress.      Breath sounds: Normal breath sounds.   Musculoskeletal:      Left shoulder: Normal.      Cervical back: Neck supple.      Right lower leg: No edema.      Left lower leg: No edema.   Lymphadenopathy:      Cervical: No cervical adenopathy.   Skin:     General: Skin is warm and dry.   Neurological:      Mental Status: She is alert and oriented to person, place, and time.   Psychiatric:         Mood and Affect: Mood normal.         Behavior: Behavior normal.        Result Review :   The following data was reviewed by: MAGNUS Ross on 06/28/2024:  A1C Last 3 Results          12/15/2023    14:20 3/15/2024    11:19 6/28/2024    12:11   HGBA1C Last 3 Results   Hemoglobin A1C 13.1  15.0  12.6        PHQ-9 Depression Screening  Little interest or pleasure in doing things? 1-->several days   Feeling down, depressed, or hopeless? 1-->several days   Trouble falling or staying asleep, or sleeping too much? 2-->more than half the days   Feeling tired or having little energy? 3-->nearly every day   Poor appetite or overeating? 1-->several days   Feeling bad about yourself - or that you are a failure or have let yourself or your family down? 1-->several days   Trouble concentrating on things, such as reading the newspaper or watching television? 1-->several days   Moving or speaking so slowly that other people could have noticed? Or the opposite - being so fidgety or restless that you have been moving around a lot more than usual? 0-->not at all   Thoughts that you would be better off dead, or of hurting yourself in some way? 0-->not at all   PHQ-9 Total Score 10   If you checked off any problems, how difficult have these problems made it for you to do your work, take care of things at home, or get along with other people? somewhat difficult         Over the last two weeks, how often have you been bothered by the following problems?  Feeling nervous, anxious or on edge:  Several days  Not being able to stop or control worrying: Several days  Worrying too much about different things: Several days  Trouble Relaxing: More than half the days  Being so restless that it is hard to sit still: Several days  Becoming easily annoyed or irritable: Several days  Feeling afraid as if something awful might happen: More than half the days  ROBEL 7 Total Score: 9  If you checked any problems, how difficult have these problems made it for you to do your work, take care of things at home, or get along with other people: Somewhat difficult           Assessment and Plan    Diagnoses and all orders for this visit:    1. Wellness examination (Primary)  Assessment & Plan:  Discussed preventative healthcare.  Labs ordered. Recommended annual eye and dental exams. Recommended use of seatbelts, sunscreen and smoke detectors in the home. Tdap up to date. Breast cancer screening up to date. Colon cancer screening up to date. Lung cancer screening up to date.     Discussed shingles vaccine (Shingrix) and she is aware she can get the Shingrix vaccine at a local pharmacy.      Orders:  -     CBC & Differential  -     Comprehensive Metabolic Panel  -     Lipid Panel  -     TSH Rfx On Abnormal To Free T4    2. Type 2 diabetes mellitus with diabetic neuropathy, with long-term current use of insulin  Overview:  Hgba1c 14 % on 8/23/2023  Hgba1c 13.1 % on 12/15/2023  Hgba1c 15 % on 3/15/2024  Hgba1c 12.6 % on 6/28/2024    Assessment & Plan:  Chronic problem.  Uncontrolled but improved.    Discussed sequela of uncontrolled diabetes: blindness, heart attack, stroke, renal failure and death.  She verbalized understanding of uncontrolled diabetes.   Continue Lantus 35 units daily, metformin 1000 mg BID and Jardiance 25 mg daily.    Orders:  -     Comprehensive Metabolic Panel  -     POC Glycosylated Hemoglobin (Hb A1C)    3. Dyslipidemia  Assessment & Plan:  Chronic stable problem.  Continue atorvastatin 80 mg daily.   Recheck fasting lipid panel    Orders:  -     Lipid Panel    4. PVD (peripheral vascular disease)  Overview:  Per cardiology note - She had a CTA with run off. This documents CTA with borderline multifocal stenosis in the L common iliac artery, R > L with diffuse SFA disease, three vessel run off bilaterally, severe nolvia superficial varicosites the majority appeared to be arising from the bilateral greater saphenous veins, the right common femoral artery may represent dissection flap vs. Residual chronic thrombus. Dr. Menard spoke with Dr. Babcock. She is suppose to have surgery but Medicaid will not approve.        Assessment & Plan:  Advised to make a follow up appointment with vascular surgery, Dr. Damian.  She states she will call.      5. Coronary artery disease involving native coronary artery of native heart without angina pectoris  Assessment & Plan:  We called and got her an appointment to see cardiology on 8/27/2024 at 2:40 pm at [facility].    Orders:  -     Ambulatory Referral to Cardiology    6. Moderate episode of recurrent major depressive disorder  Assessment & Plan:  Established problem  Continue Paxil 40 mg daily.  Discussed importance of taking routinely.      7. Atrial flutter, paroxysmal  Overview:  Per cardiology note - occurred post MI. Arrhythmia has not been documented in recurrence. She remains on oral anticoagulation due to elevated cardiac embolic risk score.      Assessment & Plan:  Chronic stable problem.   Continue Eliquis 5 mg BID.  Follow up with cardiology      8. Noncompliance with medication regimen  Assessment & Plan:  Discussed need to take medications as prescribed.  Discussed ways to remember to take her medication.  She will set an alarm on her phone.      9. Overweight (BMI 25.0-29.9)  Assessment & Plan:  Patient's (Body mass index is 28.36 kg/m².) indicates that they are overweight with health conditions that include coronary heart disease, diabetes mellitus,  dyslipidemias, and peripheral vascular disease . Weight is worsening. BMI is above average; BMI management plan is completed. We discussed low calorie, low carb based diet program, portion control, and increasing exercise.       10. Tobacco dependence due to cigarettes  Assessment & Plan:  Yana Araya  reports that she has been smoking cigarettes. She started smoking about 40 years ago. She has a 40.5 pack-year smoking history. She has been exposed to tobacco smoke. She has never used smokeless tobacco. I have educated her on the risk of diseases from using tobacco products such as cancer, COPD, and heart disease.     I advised her to quit and she is not willing to quit.    I spent 3  minutes counseling the patient.               Follow Up   Return in about 4 weeks (around 7/26/2024) for diabetes and draw labs.  Patient was given instructions and counseling regarding her condition or for health maintenance advice. Please see specific information pulled into the AVS if appropriate.    Patient Instructions   Reschedule appointment to see vascular surgery, Dr. Damian    See cardiology on 8/27/2024    Check blood sugar, record readings and bring to next appointment.

## 2024-06-29 DIAGNOSIS — R05.9 COUGH, UNSPECIFIED TYPE: ICD-10-CM

## 2024-06-30 RX ORDER — ALBUTEROL SULFATE 90 UG/1
AEROSOL, METERED RESPIRATORY (INHALATION)
Qty: 18 G | Refills: 1 | Status: SHIPPED | OUTPATIENT
Start: 2024-06-30

## 2024-06-30 NOTE — ASSESSMENT & PLAN NOTE
Yana Araya  reports that she has been smoking cigarettes. She started smoking about 40 years ago. She has a 40.5 pack-year smoking history. She has been exposed to tobacco smoke. She has never used smokeless tobacco. I have educated her on the risk of diseases from using tobacco products such as cancer, COPD, and heart disease.     I advised her to quit and she is not willing to quit.    I spent 3  minutes counseling the patient.

## 2024-06-30 NOTE — ASSESSMENT & PLAN NOTE
We called and got her an appointment to see cardiology on 8/27/2024 at 2:40 pm at [facility].

## 2024-06-30 NOTE — ASSESSMENT & PLAN NOTE
Discussed need to take medications as prescribed.  Discussed ways to remember to take her medication.  She will set an alarm on her phone.

## 2024-06-30 NOTE — ASSESSMENT & PLAN NOTE
Patient's (Body mass index is 28.36 kg/m².) indicates that they are overweight with health conditions that include coronary heart disease, diabetes mellitus, dyslipidemias, and peripheral vascular disease . Weight is worsening. BMI is above average; BMI management plan is completed. We discussed low calorie, low carb based diet program, portion control, and increasing exercise.

## 2024-06-30 NOTE — ASSESSMENT & PLAN NOTE
Advised to make a follow up appointment with vascular surgery, Dr. Damian.  She states she will call.   No additional comment

## 2024-06-30 NOTE — ASSESSMENT & PLAN NOTE
Discussed preventative healthcare.  Labs ordered. Recommended annual eye and dental exams. Recommended use of seatbelts, sunscreen and smoke detectors in the home. Tdap up to date. Breast cancer screening up to date. Colon cancer screening up to date. Lung cancer screening up to date.     Discussed shingles vaccine (Shingrix) and she is aware she can get the Shingrix vaccine at a local pharmacy.

## 2024-06-30 NOTE — ASSESSMENT & PLAN NOTE
Chronic problem.  Uncontrolled but improved.    Discussed sequela of uncontrolled diabetes: blindness, heart attack, stroke, renal failure and death.  She verbalized understanding of uncontrolled diabetes.   Continue Lantus 35 units daily, metformin 1000 mg BID and Jardiance 25 mg daily.

## 2024-08-02 ENCOUNTER — TELEPHONE (OUTPATIENT)
Dept: FAMILY MEDICINE CLINIC | Facility: CLINIC | Age: 59
End: 2024-08-02
Payer: MEDICARE

## 2024-08-02 NOTE — TELEPHONE ENCOUNTER
Hub staff attempted to follow warm transfer process and was unsuccessful     Caller: Yana Araya    Relationship to patient: Self    Best call back number:[phone]     Patient is needing:  RETURNED CALL TO CLINIC- REFERRAL

## 2024-08-13 ENCOUNTER — TELEPHONE (OUTPATIENT)
Dept: FAMILY MEDICINE CLINIC | Facility: CLINIC | Age: 59
End: 2024-08-13
Payer: MEDICARE

## 2024-09-26 RX ORDER — APIXABAN 5 MG/1
5 TABLET, FILM COATED ORAL EVERY 12 HOURS
Qty: 60 TABLET | Refills: 0 | Status: SHIPPED | OUTPATIENT
Start: 2024-09-26

## 2024-10-14 ENCOUNTER — TELEPHONE (OUTPATIENT)
Dept: FAMILY MEDICINE CLINIC | Facility: CLINIC | Age: 59
End: 2024-10-14
Payer: MEDICARE

## 2024-10-14 NOTE — TELEPHONE ENCOUNTER
There have been several attempts to contact patient in regards to incomplete lab orders      7/29/24 [employer] message sent  08/13/24 lvm for the patient to get labs done.  08/27/24 letter mailed    Would you like to cancel orders?

## 2024-10-21 NOTE — TELEPHONE ENCOUNTER
Left message for pt to call office.    [facility] PALLIATIVE CARE SPECIALITY CONSULT NOTE      Patient: Alfredo Carr Date: 2020   : 1951 Attending: Santy Traylor MD   69 year old male PCP: Kelly Sweeney MD   MRN: [id number]  Date of admission: 2020     Consult requested by Dr. Traylor to assist with goal clarification in context of cancer. Palliative Care was  consulted for education, planning and support for the future.     HPI: Alfredo Carr is a 69 year old male who was admitted on 2020 for generalized weakness and complaints of abdominal and chest pain.  Mr. Carr had his original prostate cancer diagnosis is  which had been slow growing. In 2020 he presented with a cervical mass which lead to emergency surgery secondary to cord compression. 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 He was further worked up and it was found that he had metastatic disease to the bone and liver. He underwent palliative chemotherapy about a week ago and has been admitted to hospital for worsening symptoms.     PALLIATIVE CARE ASSESSMENT     MEDICAL Pertinent Dx:  Malignant Neoplasms of Prostate  Liver Metastases  Hypercalcemia of Malignancy   Bone Metastases  Generalized Weakness  Gait Abnormality  Anorexia  Jaundice    Pertinent Symptom Assessment:  Pain: present, with suboptimal control. Numeric Rating Scale 0-10: Acute Comfort/Function Pain Goal 2  Comfort/Function Pain Score (at REST) 3  Comfort/Function Pain Score (w/ACTIVITY) 7   Pain Location mid/lower abdomen  Pain Descriptor  Visceral  Pain Quality  aching, Intermittent and pressure  Pain Aggravating Factors  movement  Pain Alleviating Factors  medication including Oxycodone and Morphine (IV)  and .  Dyspnea: present, with satisfactory control  Other Symptoms:   Anorexia:  present, with suboptimal control  Fatigue:  present, with suboptimal control    Functional status currentECO: completely disabled. Cannot carry on any  self care. Totally confined to bed or chair.    Prognosis (quality & quantity)  · Poor: Widely metastatic cancer, in setting of progressive weakness and anorexia. Likely has less than 6 month prognosis which would qualify the patient for hospice care if within his goals of care.        PSYCHOSOCIAL  SPIRITUAL  CULTURAL     The patient currently lives in single level condo with his spouse.  The family and support system include adult children and spouse. Work history is non-contributory, he was a    A discussion regarding spiritual need/Zoroastrianism history included none.  Cultural considerations specific to this patient include none noted .        CARE TRANSITIONS   Continuity health care providers: Kelly Sweeney MD    Anticipated change in disposition? no     ETHICAL  LEGAL Decision-making capacity: decisional.    Advanced Care Planning Document: available on EPIC  · [facility]: Listed #1 Katherine Noyolatz  (Relation: wife ), Listed #2 Troy Noyolatz (Relation: son),   · Does current code status align with advance directive? Yes  · Other notable aspects of the DPOA document: 5 wishes, some specifics about the care he desires to receive or not.   · Code Status: Selective Treatment/DNR, Maximal Medical Support  (continue usual medical care until cessation of heartbeat and respiration)         GOALS OF CARE     Meeting leader: Lizeth العراقي NP  Participants: NP, patient and his wife  Location: Patient Room   Purpose of meeting: comprehensive pain review   Patient able to participate: Yes  Decision maker: Patient    Goals of care:  \"taking it day by day\", we did not discuss potential transfer to hospice care at todays visit, we strickly discussed pain symptoms and management stragety as well as discussion regarding HCPOA.       Discussion/Decisions:  Medical Update: Pain management today, took one dose of IV Morphine, his pain is well controlled if he does not move at all. I expressed that we hope to better  HISTORY: Metastases suspected, unknown primary, Metastatic prostate cancer, new poorly differentiated carcinoma COMPARISON: CT abdomen pelvis from April 11, 2019. TECHNIQUE: Helical CT of the chest, abdomen, and pelvis was performed with contrast with triplanar reconstructions. 100 mL of Isovue-300 was administered. FINDINGS: CHEST Neck Base: Normal. Mediastinum/Axillae: No enlarged lymph nodes. Esophagus: Unremarkable. Heart/Coronaries: Calcified atherosclerotic plaque in the coronary arteries. No pericardial effusion. Lungs: Moderate atelectasis at the right changes at the right lung base. ABDOMEN/PELVIS Liver/Biliary: Innumerable low-density lesions scattered throughout the liver, the largest of which, in the right hepatic lobe, measures 6 x 4 cm. Gallbladder: Decompressed. Pancreas: Normal. Spleen: Normal. Kidneys: Nonobstructing right renal calculi, the largest of which measures 3 mm. The kidneys are otherwise unremarkable. Adrenals: Normal. Stomach/Bowel: There are diverticula throughout the sigmoid colon and to a lesser extent descending colon. Mesentery/Retroperitoneum: Unremarkable. Pelvis: Prior prostatectomy. The urinary bladder is unremarkable. Vessels: Scattered mild atherosclerotic plaque in the aorta and neck arteries. Bones/Soft Tissues: Degenerative changes throughout the lumbar spine, most prominent at L5-S1.     Impression: IMPRESSION: 1.  Moderate right lower lobe atelectasis. 2.  Coronary artery disease. 3.  Interval development of diffuse hepatic metastatic disease. The largest metastasis measures 6 x 4 cm. 4.  Colonic diverticulosis.    Nm Bone Whole Body    Result Date: 6/26/2020  Narrative: NM BONE WHOLE BODY CLINICAL INDICATION: Metastatic prostate cancer Status post bilateral laminectomy at C2-C3 with resection of mass on 6/8/2020.  Pathology revealed poorly differentiated prostate carcinoma with neuroendocrine features. COMPARISON:  SHORTNESS OF BREATH   • Dust SHORTNESS OF BREATH   • Pollen SHORTNESS OF BREATH     MEDICATIONS  • pantoprazole  40 mg Intravenous 2 times per day   • sodium chloride (PF)  2 mL Intracatheter 2 times per day   • enoxaparin  40 mg Subcutaneous Daily   • dexamethasone  4 mg Oral Daily with breakfast   • atenolol  25 mg Oral Daily   • calcium carbonate-vitamin D  1 tablet Oral BID WC   • docusate sodium-sennosides  2 tablet Oral Nightly     PAST MEDICAL HISTORY, SOCIAL HISTORY, AND FAMILY HISTORY  Reviewed on [facility] with relevant information as follows:  PMH:   Past Medical History:   Diagnosis Date   • BRCA2 positive 8/19/2019   • Chronic foot pain    • Elevated prostate specific antigen (PSA) 3/17/2014   • Epigastric hernia 10/2011   • Gout    • History of blood transfusion ~2006    knee surgery   • Hyperlipidemia    • Hypertension    • Kidney stone    • Malignant neoplasm (CMS/HCC)     prostate   • MRSA infection 11/4/2014    skin    • Neuritis of foot    • Obstructive sleep apnea on CPAP     uses CPAP   • Old myocardial infarction 2000   • Pes planus    • Plantar fasciitis      PSH:   Social History     Socioeconomic History   • Marital status: /Civil Union     Spouse name: Not on file   • Number of children: 4   • Years of education: Not on file   • Highest education level: Not on file   Occupational History   • Not on file   Social Needs   • Financial resource strain: Not on file   • Food insecurity:     Worry: Not on file     Inability: Not on file   • Transportation needs:     Medical: Not on file     Non-medical: Not on file   Tobacco Use   • Smoking status: Never Smoker   • Smokeless tobacco: Never Used   Substance and Sexual Activity   • Alcohol use: Not Currently     Alcohol/week: 3.0 standard drinks     Types: 3 Standard drinks or equivalent per week     Frequency: 4 or more times a week     Drinks per session: 1 or 2     Binge frequency: Never     Comment: daily - quit drinking 3/7/20   • Drug use:  NP Dr. Traylor and Unit RN Meenu Terrell.     Billing Statement:  Total time for today's encounter was 60 minutes, with more than 50% of that time spent counseling and coordinating care regarding the above.      Lizeth EPPERSON  Palliative Care

## 2024-10-24 LAB
ALBUMIN SERPL-MCNC: 3.7 G/DL (ref 3.8–4.9)
ALP SERPL-CCNC: 133 IU/L (ref 44–121)
ALT SERPL-CCNC: 31 IU/L (ref 0–32)
AST SERPL-CCNC: 19 IU/L (ref 0–40)
BASOPHILS # BLD AUTO: 0.1 X10E3/UL (ref 0–0.2)
BASOPHILS NFR BLD AUTO: 1 %
BILIRUB SERPL-MCNC: 0.3 MG/DL (ref 0–1.2)
BUN SERPL-MCNC: 12 MG/DL (ref 6–24)
BUN/CREAT SERPL: 20 (ref 9–23)
CALCIUM SERPL-MCNC: 9.1 MG/DL (ref 8.7–10.2)
CHLORIDE SERPL-SCNC: 98 MMOL/L (ref 96–106)
CHOLEST SERPL-MCNC: 137 MG/DL (ref 100–199)
CO2 SERPL-SCNC: 30 MMOL/L (ref 20–29)
CREAT SERPL-MCNC: 0.6 MG/DL (ref 0.57–1)
EGFRCR SERPLBLD CKD-EPI 2021: 104 ML/MIN/1.73
EOSINOPHIL # BLD AUTO: 0.1 X10E3/UL (ref 0–0.4)
EOSINOPHIL NFR BLD AUTO: 1 %
ERYTHROCYTE [DISTWIDTH] IN BLOOD BY AUTOMATED COUNT: 12.7 % (ref 11.7–15.4)
GLOBULIN SER CALC-MCNC: 2 G/DL (ref 1.5–4.5)
GLUCOSE SERPL-MCNC: 304 MG/DL (ref 70–99)
HCT VFR BLD AUTO: 47.9 % (ref 34–46.6)
HDLC SERPL-MCNC: 53 MG/DL
HGB BLD-MCNC: 15 G/DL (ref 11.1–15.9)
IMM GRANULOCYTES # BLD AUTO: 0 X10E3/UL (ref 0–0.1)
IMM GRANULOCYTES NFR BLD AUTO: 0 %
LDLC SERPL CALC-MCNC: 59 MG/DL (ref 0–99)
LYMPHOCYTES # BLD AUTO: 4 X10E3/UL (ref 0.7–3.1)
LYMPHOCYTES NFR BLD AUTO: 38 %
MCH RBC QN AUTO: 27.1 PG (ref 26.6–33)
MCHC RBC AUTO-ENTMCNC: 31.3 G/DL (ref 31.5–35.7)
MCV RBC AUTO: 87 FL (ref 79–97)
MONOCYTES # BLD AUTO: 0.5 X10E3/UL (ref 0.1–0.9)
MONOCYTES NFR BLD AUTO: 5 %
NEUTROPHILS # BLD AUTO: 6 X10E3/UL (ref 1.4–7)
NEUTROPHILS NFR BLD AUTO: 55 %
PLATELET # BLD AUTO: 185 X10E3/UL (ref 150–450)
POTASSIUM SERPL-SCNC: 4.2 MMOL/L (ref 3.5–5.2)
PROT SERPL-MCNC: 5.7 G/DL (ref 6–8.5)
RBC # BLD AUTO: 5.53 X10E6/UL (ref 3.77–5.28)
SODIUM SERPL-SCNC: 138 MMOL/L (ref 134–144)
TRIGL SERPL-MCNC: 145 MG/DL (ref 0–149)
TSH SERPL DL<=0.005 MIU/L-ACNC: 1.2 UIU/ML (ref 0.45–4.5)
VLDLC SERPL CALC-MCNC: 25 MG/DL (ref 5–40)
WBC # BLD AUTO: 10.7 X10E3/UL (ref 3.4–10.8)

## 2024-10-30 ENCOUNTER — TELEPHONE (OUTPATIENT)
Dept: FAMILY MEDICINE CLINIC | Facility: CLINIC | Age: 59
End: 2024-10-30

## 2024-10-30 NOTE — TELEPHONE ENCOUNTER
"        Hub staff attempted to follow warm transfer process and was unsuccessful     Caller: Yana Araya    Relationship to patient: Self    Best call back number:     Yana Araya (Self) [phone] (Mobile)       Patient is needing:  NEEDING TO SCHEDULE APPT AND LABS    PATIENT HAD A \"PHYSICAL \" APPT IN JUNE, BUT THE MESSAGES STATE THAT SHE NEEDS A \"MWV\"     CAN YOU CONFIRM?   "

## 2024-11-21 ENCOUNTER — OFFICE VISIT (OUTPATIENT)
Dept: FAMILY MEDICINE CLINIC | Facility: CLINIC | Age: 59
End: 2024-11-21
Payer: MEDICARE

## 2024-11-21 VITALS
HEIGHT: 65 IN | TEMPERATURE: 97.4 F | OXYGEN SATURATION: 95 % | RESPIRATION RATE: 18 BRPM | BODY MASS INDEX: 28.39 KG/M2 | WEIGHT: 170.4 LBS | SYSTOLIC BLOOD PRESSURE: 128 MMHG | DIASTOLIC BLOOD PRESSURE: 83 MMHG | HEART RATE: 83 BPM

## 2024-11-21 DIAGNOSIS — Z91.148 NONCOMPLIANCE WITH MEDICATION REGIMEN: ICD-10-CM

## 2024-11-21 DIAGNOSIS — R41.89 IMPAIRED COGNITION: ICD-10-CM

## 2024-11-21 DIAGNOSIS — R10.13 EPIGASTRIC PAIN: ICD-10-CM

## 2024-11-21 DIAGNOSIS — I25.10 CORONARY ARTERY DISEASE INVOLVING NATIVE CORONARY ARTERY OF NATIVE HEART WITHOUT ANGINA PECTORIS: ICD-10-CM

## 2024-11-21 DIAGNOSIS — I48.92 ATRIAL FLUTTER, PAROXYSMAL: ICD-10-CM

## 2024-11-21 DIAGNOSIS — Z79.4 TYPE 2 DIABETES MELLITUS WITH DIABETIC NEUROPATHY, WITH LONG-TERM CURRENT USE OF INSULIN: ICD-10-CM

## 2024-11-21 DIAGNOSIS — E66.3 OVERWEIGHT (BMI 25.0-29.9): ICD-10-CM

## 2024-11-21 DIAGNOSIS — F17.210 TOBACCO DEPENDENCE DUE TO CIGARETTES: ICD-10-CM

## 2024-11-21 DIAGNOSIS — I73.9 PVD (PERIPHERAL VASCULAR DISEASE): ICD-10-CM

## 2024-11-21 DIAGNOSIS — F33.1 MODERATE EPISODE OF RECURRENT MAJOR DEPRESSIVE DISORDER: ICD-10-CM

## 2024-11-21 DIAGNOSIS — E78.5 DYSLIPIDEMIA: ICD-10-CM

## 2024-11-21 DIAGNOSIS — E11.40 TYPE 2 DIABETES MELLITUS WITH DIABETIC NEUROPATHY, WITH LONG-TERM CURRENT USE OF INSULIN: ICD-10-CM

## 2024-11-21 DIAGNOSIS — Z28.21 INFLUENZA VACCINATION DECLINED: ICD-10-CM

## 2024-11-21 DIAGNOSIS — Z00.00 WELCOME TO MEDICARE PREVENTIVE VISIT: Primary | ICD-10-CM

## 2024-11-21 LAB
EXPIRATION DATE: ABNORMAL
HBA1C MFR BLD: 15 % (ref 4.5–5.7)
Lab: ABNORMAL

## 2024-11-21 RX ORDER — CARBOXYMETHYLCELLULOSE SODIUM 5 MG/ML
SOLUTION/ DROPS OPHTHALMIC 3 TIMES DAILY PRN
COMMUNITY

## 2024-11-21 RX ORDER — VALACYCLOVIR HYDROCHLORIDE 500 MG/1
500 TABLET, FILM COATED ORAL 2 TIMES DAILY
COMMUNITY

## 2024-11-21 RX ORDER — GABAPENTIN 300 MG/1
300 CAPSULE ORAL 3 TIMES DAILY
COMMUNITY

## 2024-11-21 RX ORDER — PANTOPRAZOLE SODIUM 40 MG/1
40 TABLET, DELAYED RELEASE ORAL DAILY
Qty: 30 TABLET | Refills: 3 | Status: SHIPPED | OUTPATIENT
Start: 2024-11-21

## 2024-11-21 NOTE — PATIENT INSTRUCTIONS
Monitor blood sugar at home and bring a copy of home readings to next appointment.    Restart Lantus insulin daily    Start pantoprazole 40 mg daily for abd pain and nausea    The [facility] Scheduling Department will contact you to schedule ordered testing (MRI brain).  If you would like to schedule or verify your appointment, you can call [facility] Scheduling at [phone].       Reschedule appointment to see endocrinology, Dr. Wolfe, for diabetes

## 2024-11-21 NOTE — ASSESSMENT & PLAN NOTE
Chronic problem.  Uncontrolled and worsening.     Discussed sequela of uncontrolled diabetes: blindness, heart attack, stroke, renal failure and death.  She verbalized understanding of uncontrolled diabetes.   Restart Lantus 35 units daily,   Take metformin 1000 mg BID and Jardiance 25 mg daily.    Check blood sugar at home and record readings and follow up in office in 2-3 weeks.  Reschedule appointment with endocrinology    Orders:    POC Glycosylated Hemoglobin (Hb A1C)    Ambulatory Referral to Endocrinology    metFORMIN (GLUCOPHAGE) 1000 MG tablet; Take 1 tablet by mouth 2 (Two) Times a Day With Meals.    empagliflozin (JARDIANCE) 25 MG tablet tablet; Take 1 tablet by mouth Daily.    Insulin Glargine (LANTUS SOLOSTAR) 100 UNIT/ML injection pen; Inject 35 Units under the skin into the appropriate area as directed Daily.

## 2024-11-21 NOTE — ASSESSMENT & PLAN NOTE
She continues to be noncompliant with medications.  Again discussed ways to remember to take her medication.  Discussed sequela of uncontrolled diabetes.

## 2024-11-21 NOTE — ASSESSMENT & PLAN NOTE
Yana Araya  reports that she has been smoking cigarettes. She started smoking about 40 years ago. She has a 40.9 pack-year smoking history. She has been exposed to tobacco smoke. She has never used smokeless tobacco. I have educated her on the risk of diseases from using tobacco products such as cancer, COPD, and heart disease.     I advised her to quit and she is not ready to quit.

## 2024-11-21 NOTE — ASSESSMENT & PLAN NOTE
Established problem.  She does not want to adjust medication.  Continue Paxil 40 mg daily.    Discussed importance of taking routinely.

## 2024-11-21 NOTE — ASSESSMENT & PLAN NOTE
Chronic stable problem.  Continue atorvastatin 80 mg daily.    Recheck fasting lipid panel in October 2025

## 2024-11-21 NOTE — ASSESSMENT & PLAN NOTE
Patient's (Body mass index is 28.36 kg/m².) indicates that they are overweight (BMI 25-29.9) with health related conditions that include hypertension, diabetes mellitus, and dyslipidemias . Weight is unchanged. BMI is is above average; BMI management plan is completed. We discussed low calorie, low carb based diet program, portion control, and increasing exercise.

## 2024-11-21 NOTE — PROGRESS NOTES
Subjective   The ABCs of the Annual Wellness Visit  Medicare Wellness Visit      Yana Araya is a 59 y.o. patient who presents for a Medicare Wellness Visit.    The following portions of the patient's history were reviewed and   updated as appropriate: allergies, current medications, past family history, past medical history, past social history, past surgical history, and problem list.    Compared to one year ago, the patient's physical   health is worse.  Compared to one year ago, the patient's mental   health is worse.    Recent Hospitalizations:  She was not admitted to the hospital during the last year.     Current Medical Providers:  Patient Care Team:  Melania Damico APRN as PCP - General (Family Medicine)  Darian Babcock MD as Cardiologist (Interventional Cardiology)  Rey Shaw MD as Consulting Physician (Pulmonary Disease)  Matt Damian MD as Surgeon (Thoracic Surgery)  Mer Alvarado (Optometry)    Outpatient Medications Prior to Visit   Medication Sig Dispense Refill    acetaminophen (TYLENOL) 500 MG tablet Take 2 tablets by mouth Every 6 (Six) Hours As Needed for Mild Pain.      albuterol sulfate  (90 Base) MCG/ACT inhaler INHALE 1 TO 2 PUFFS BY MOUTH EVERY 4 TO 6 HOURS AS NEEDED FOR COUGH AND FOR SHORTNESS OF BREATH 18 g 1    apixaban (Eliquis) 5 MG tablet tablet Take 1 tablet by mouth Every 12 (Twelve) Hours. PLEASE CALL OFFICE 14 tablet 0    atenolol (TENORMIN) 25 MG tablet Take 1 tablet by mouth Daily.      atorvastatin (LIPITOR) 80 MG tablet Take 1 tablet by mouth Daily. 90 tablet 3    Blood Glucose Monitoring Suppl w/Device kit 1 Device Daily. DX E11.9 1 each 0    Carboxymethylcellul-Glycerin (REFRESH TEARS PF OP) Apply  to eye(s) as directed by provider.      clopidogrel (PLAVIX) 75 MG tablet Take 1 tablet by mouth Daily.      gabapentin (NEURONTIN) 300 MG capsule Take 1 capsule by mouth 3 (Three) Times a Day.      glucose blood test strip Blood sugar twice  daily diabetes E11.9 100 each 12    Lancets misc 1 container 2 (Two) Times a Day. DX E11.9 100 each 2    nystatin (MYCOSTATIN) 600860 UNIT/GM cream Apply 1 application  topically to the appropriate area as directed 2 (Two) Times a Day. (Patient taking differently: Apply 1 Application topically to the appropriate area as directed As Needed.) 60 g 1    PARoxetine (PAXIL) 40 MG tablet Take 1 tablet by mouth Daily. 90 tablet 1    valACYclovir (VALTREX) 500 MG tablet Take 1 tablet by mouth 2 (Two) Times a Day.      empagliflozin (JARDIANCE) 25 MG tablet tablet Take 1 tablet by mouth Daily. 90 tablet 1    carboxymethylcellulose (REFRESH PLUS) 0.5 % solution Administer  to both eyes 3 (Three) Times a Day As Needed for Dry Eyes.      isosorbide mononitrate (IMDUR) 30 MG 24 hr tablet Take 1 tablet by mouth Daily. (Patient not taking: Reported on 11/21/2024)      Insulin Glargine (LANTUS SOLOSTAR) 100 UNIT/ML injection pen Inject 35 Units under the skin into the appropriate area as directed Daily. (Patient taking differently: Inject 35 Units under the skin into the appropriate area as directed Daily. Sometimes)      metFORMIN (GLUCOPHAGE) 1000 MG tablet Take 1 tablet by mouth 2 (Two) Times a Day With Meals. (Patient taking differently: Take 1 tablet by mouth 2 (Two) Times a Day With Meals. Sometimes) 180 tablet 1     No facility-administered medications prior to visit.     No opioid medication identified on active medication list. I have reviewed chart for other potential  high risk medication/s and harmful drug interactions in the elderly.      Aspirin is not on active medication list.  Aspirin use is contraindicated for this patient due to: current use of Eliquis and current use of clopidogrel.  .    Patient Active Problem List   Diagnosis    Non-pressure ulcer of left lower extremity with fat layer exposed    Gastroenteritis presumed infectious    Atrial flutter, paroxysmal    Type 2 diabetes mellitus    Essential  "hypertension    Dyslipidemia    S/P PTCA (percutaneous transluminal coronary angioplasty)    ST elevation myocardial infarction involving right coronary artery    Tobacco user    Wellness examination    PVD (peripheral vascular disease)    Chronic left shoulder pain    Coronary artery disease involving native coronary artery of native heart without angina pectoris    Overweight (BMI 25.0-29.9)    Diabetic retinopathy    Tobacco dependence due to cigarettes    Depression    Multinodular thyroid    Lung nodule    Pulmonary emphysema    Noncompliance with medication regimen    Welcome to Medicare preventive visit     Advance Care Planning Advance Directive is not on file.  ACP discussion was held with the patient during this visit. Patient does not have an advance directive, information provided.        Objective   Vitals:    11/21/24 1025   BP: 128/83   Pulse: 83   Resp: 18   Temp: 97.4 °F (36.3 °C)   TempSrc: Temporal   SpO2: 95%   Weight: 77.3 kg (170 lb 6.4 oz)   Height: 165.1 cm (65\")   PainSc: 0-No pain       Estimated body mass index is 28.36 kg/m² as calculated from the following:    Height as of this encounter: 165.1 cm (65\").    Weight as of this encounter: 77.3 kg (170 lb 6.4 oz).        Gait and Balance Evaluation:  Loss of Balance and Steadying Self on Patino    Does the patient have evidence of cognitive impairment? Yes    Mini-Cog  Word Recall __1__ (0-3 points)    Clock Draw  _1___ (0 or 2 points)    Total Score __2__ (0-5 points)        Lab Results   Component Value Date    CHLPL 137 10/23/2024    TRIG 145 10/23/2024    HDL 53 10/23/2024    LDL 59 10/23/2024    VLDL 25 10/23/2024    HGBA1C 15.0 (A) 11/21/2024                                                                                                Health  Risk Assessment    Smoking Status:  Social History     Tobacco Use   Smoking Status Every Day    Current packs/day: 1.00    Average packs/day: 1 pack/day for 40.9 years (40.9 ttl pk-yrs)    Types: " Cigarettes    Start date: 1984    Passive exposure: Current   Smokeless Tobacco Never     Alcohol Consumption:  Social History     Substance and Sexual Activity   Alcohol Use Never       Fall Risk Screen  STEADI Fall Risk Assessment was completed, and patient is at LOW risk for falls.Assessment completed on:11/21/2024    Depression Screening   Little interest or pleasure in doing things? Several days   Feeling down, depressed, or hopeless? Several days   PHQ-2 Total Score 2   Trouble falling or staying asleep, or sleeping too much? Almost all   Feeling tired or having little energy? Over half   Poor appetite or overeating? Over half   Feeling bad about yourself - or that you are a failure or have let yourself or your family down? Not at all   Trouble concentrating on things, such as reading the newspaper or watching television? Over half   Moving or speaking so slowly that other people could have noticed? Or the opposite - being so fidgety or restless that you have been moving around a lot more than usual? Several days   Thoughts that you would be better off dead, or of hurting yourself in some way? Not at all   PHQ-9 Total Score 12   If you checked off any problems, how difficult have these problems made it for you to do your work, take care of things at home, or get along with other people? Somewhat difficult        Over the last two weeks, how often have you been bothered by the following problems?  Feeling nervous, anxious or on edge: Several days  Not being able to stop or control worrying: Not at all  Worrying too much about different things: Not at all  Trouble Relaxing: Not at all  Being so restless that it is hard to sit still: Not at all  Becoming easily annoyed or irritable: More than half the days  Feeling afraid as if something awful might happen: More than half the days  ROBEL 7 Total Score: 5  If you checked any problems, how difficult have these problems made it for you to do your work, take care of  things at home, or get along with other people: Somewhat difficult    Health Habits and Functional and Cognitive Screenin/21/2024    10:35 AM   Functional & Cognitive Status   Do you have difficulty preparing food and eating? No   Do you have difficulty bathing yourself, getting dressed or grooming yourself? No   Do you have difficulty using the toilet? No   Do you have difficulty moving around from place to place? No   Do you have trouble with steps or getting out of a bed or a chair? No   Current Diet Unhealthy Diet   Dental Exam Not up to date   Eye Exam Up to date   Exercise (times per week) 0 times per week   Current Exercises Include No Regular Exercise   Do you need help using the phone?  No   Are you deaf or do you have serious difficulty hearing?  Yes   Do you need help to go to places out of walking distance? No   Do you need help shopping? No   Do you need help preparing meals?  No   Do you need help with housework?  No   Do you need help with laundry? No   Do you need help taking your medications? No   Do you need help managing money? No   Do you ever drive or ride in a car without wearing a seat belt? No   Have you felt unusual stress, anger or loneliness in the last month? No   Who do you live with? Alone   If you need help, do you have trouble finding someone available to you? No   Have you been bothered in the last four weeks by sexual problems? No   Do you have difficulty concentrating, remembering or making decisions? Yes           Visual Acuity:  Vision Screening    Right eye Left eye Both eyes   Without correction      With correction 20/20 20/25 20/20     Age-appropriate Screening Schedule:  Refer to the list below for future screening recommendations based on patient's age, sex and/or medical conditions. Orders for these recommended tests are listed in the plan section. The patient has been provided with a written plan.    Health Maintenance List  Health Maintenance   Topic Date Due     Hepatitis B (1 of 3 - 19+ 3-dose series) Never done    ZOSTER VACCINE (1 of 2) Never done    COVID-19 Vaccine (1 - 2024-25 season) 11/23/2024 (Originally 9/1/2024)    INFLUENZA VACCINE  03/31/2025 (Originally 8/1/2024)    LUNG CANCER SCREENING  03/12/2025    URINE MICROALBUMIN  03/15/2025    HEMOGLOBIN A1C  05/21/2025    BMI FOLLOWUP  06/28/2025    LIPID PANEL  10/23/2025    DIABETIC EYE EXAM  10/23/2025    ANNUAL WELLNESS VISIT  11/21/2025    MAMMOGRAM  01/31/2026    TDAP/TD VACCINES (2 - Td or Tdap) 03/02/2033    COLORECTAL CANCER SCREENING  12/14/2033    HEPATITIS C SCREENING  Completed    Pneumococcal Vaccine 0-64  Completed                                                                                                                                                CMS Preventative Services Quick Reference  Risk Factors Identified During Encounter  Depression/Dysphoria: Current medication is effective, no change recommended  Immunizations Discussed/Encouraged: Influenza  Dental Screening Recommended  Vision Screening Recommended    The above risks/problems have been discussed with the patient.  Pertinent information has been shared with the patient in the After Visit Summary.  An After Visit Summary and PPPS were made available to the patient.    Follow Up:   Next Medicare Wellness visit to be scheduled in 1 year.       Additional E&M Note during same encounter follows:  Patient has additional, significant, and separately identifiable condition(s)/problem(s) that require work above and beyond the Medicare Wellness Visit     Chief Complaint  Welcome To Medicare, Diabetes, and Hyperlipidemia    Yolande Millan is also being seen today for additional medical problem/s.    Patient presents for follow-up of diabetes  This is an established problem.  Not controlled and worsening.  Interim treatment changes: She is not taking medication routinely.    She was referred to endocrinology and cancelled  appointment on 10/1/2024  She is out of Lantus insulin and states she has been out for a couple months.  She misses doses of metformin and Jardiance  Drinks Big Red daily - up to a 12 pack  per day  Current medications: Jardiance 25 mg daily and Lantus 35 units daily and metformin 1000 mg BID  Checking blood sugar at home?  No.    Hgba1c 15 % on 11/21/2024  Diabetic eye exam: 6/13/2024       ++++++++++++++++++++++++     Patient presents for follow-up of hyperlipidemia  This is an established problem  Interim treatment changes: none  Current medications: atorvastatin 80 mg daily  Lipid panel 10/23/2024     ++++++++++++++++++++++++      Patient presents for follow-up of CAD and PVD  This is an established problem  Interim treatment changes: none  Current medications: Eliquis, Plavix  She was referred to vascular surgeon.  She saw Dr. Damian on 9/16/2024 and will follow up with him in 6 months  Sees cardiology, Dr. Babcock.  Last appointment on 8/13/2024.  She has an appt to see him on 12/9/2024     ++++++++++++++++++++++++     Patient presents for follow-up of depression  This is an established problem  Interim treatment changes: she misses doses of paxil  Current medications: Paxil 40 mg daily    PHQ-9 = 12 on 11/21/2024  Was 10 on 6/28/2024    ROBEL-7 =  5 on 11/21/2024  Was 9 on 6/28/2024        She saw Adan Verdin MD at [facility]  She states she is going to have back surgery    She has been seeing eye doctor, Dr. Alvarado for HSV infection      Review of Systems   Constitutional:  Positive for fatigue. Negative for fever.   HENT:  Positive for trouble swallowing. Negative for ear pain and sore throat.    Respiratory:  Positive for cough. Negative for shortness of breath.    Gastrointestinal:  Positive for abdominal pain and diarrhea. Negative for blood in stool and constipation.        No heartburn   Endocrine: Positive for polydipsia. Negative for polyphagia and polyuria.   Genitourinary:  Negative for  "dysuria and hematuria.   Musculoskeletal:  Positive for back pain.   Neurological:  Positive for dizziness.        + forgetful   Psychiatric/Behavioral:  Positive for dysphoric mood. The patient is nervous/anxious.         Objective   Vital Signs:  /83   Pulse 83   Temp 97.4 °F (36.3 °C) (Temporal)   Resp 18   Ht 165.1 cm (65\")   Wt 77.3 kg (170 lb 6.4 oz)   SpO2 95%   BMI 28.36 kg/m²   Physical Exam  Vitals reviewed.   Constitutional:       General: She is not in acute distress.     Appearance: Normal appearance.   HENT:      Head: Normocephalic and atraumatic.      Right Ear: Tympanic membrane, ear canal and external ear normal.      Left Ear: Tympanic membrane, ear canal and external ear normal.      Mouth/Throat:      Mouth: Mucous membranes are moist.      Pharynx: Oropharynx is clear.   Eyes:      General: No scleral icterus.     Conjunctiva/sclera: Conjunctivae normal.   Neck:      Thyroid: No thyromegaly.   Cardiovascular:      Rate and Rhythm: Normal rate and regular rhythm.      Heart sounds: Normal heart sounds.   Pulmonary:      Effort: Pulmonary effort is normal. No respiratory distress.      Breath sounds: Normal breath sounds.   Musculoskeletal:      Left shoulder: Normal.      Cervical back: Neck supple.      Right lower leg: No edema.      Left lower leg: No edema.   Lymphadenopathy:      Cervical: No cervical adenopathy.   Skin:     General: Skin is warm and dry.   Neurological:      Mental Status: She is alert and oriented to person, place, and time.   Psychiatric:         Mood and Affect: Mood normal.         Behavior: Behavior normal.       The following data was reviewed by: MAGNUS Ross on 11/21/2024:  Common labs          6/28/2024    12:11 10/23/2024    07:55 11/21/2024    10:47   Common Labs   Glucose  304     BUN  12     Creatinine  0.60     Sodium  138     Potassium  4.2     Chloride  98     Calcium  9.1     Total Protein  5.7     Albumin  3.7     Total Bilirubin  " 0.3     Alkaline Phosphatase  133     AST (SGOT)  19     ALT (SGPT)  31     WBC  10.7     Hemoglobin  15.0     Hematocrit  47.9     Platelets  185     Total Cholesterol  137     Triglycerides  145     HDL Cholesterol  53     LDL Cholesterol   59     Hemoglobin A1C 12.6   15.0            Assessment and Plan        Welcome to Medicare preventive visit  Updated annual wellness visit checklist.  Immunizations discussed. Recommended annual eye and dental exams. Recommended use of seatbelts, sunscreen and smoke detectors in the home.        Type 2 diabetes mellitus with diabetic neuropathy, with long-term current use of insulin  Chronic problem.  Uncontrolled and worsening.     Discussed sequela of uncontrolled diabetes: blindness, heart attack, stroke, renal failure and death.  She verbalized understanding of uncontrolled diabetes.   Restart Lantus 35 units daily,   Take metformin 1000 mg BID and Jardiance 25 mg daily.    Check blood sugar at home and record readings and follow up in office in 2-3 weeks.  Reschedule appointment with endocrinology    Orders:    POC Glycosylated Hemoglobin (Hb A1C)    Ambulatory Referral to Endocrinology    metFORMIN (GLUCOPHAGE) 1000 MG tablet; Take 1 tablet by mouth 2 (Two) Times a Day With Meals.    empagliflozin (JARDIANCE) 25 MG tablet tablet; Take 1 tablet by mouth Daily.    Insulin Glargine (LANTUS SOLOSTAR) 100 UNIT/ML injection pen; Inject 35 Units under the skin into the appropriate area as directed Daily.    Dyslipidemia  Chronic stable problem.  Continue atorvastatin 80 mg daily.    Recheck fasting lipid panel in October 2025       PVD (peripheral vascular disease)  Chronic stable problem.  Follow up with vascular surgery, Dr. Damian, as scheduled.         Coronary artery disease involving native coronary artery of native heart without angina pectoris  Chronic stable problem.  Follow up with cardiology as scheduled.         Moderate episode of recurrent major depressive  disorder  Established problem.  She does not want to adjust medication.  Continue Paxil 40 mg daily.    Discussed importance of taking routinely.         Atrial flutter, paroxysmal  Chronic stable problem.   Continue Eliquis 5 mg BID.  Follow up with cardiology as scheduled       Impaired cognition  Ordered MRI brain  Orders:    MRI Brain Without Contrast; Future    Epigastric pain  Begin pantoprazole 40 mg daily       Tobacco dependence due to cigarettes  Yana Araya  reports that she has been smoking cigarettes. She started smoking about 40 years ago. She has a 40.9 pack-year smoking history. She has been exposed to tobacco smoke. She has never used smokeless tobacco. I have educated her on the risk of diseases from using tobacco products such as cancer, COPD, and heart disease.     I advised her to quit and she is not ready to quit.         Influenza vaccination declined  Discussed flu vaccine and she declined.        Overweight (BMI 25.0-29.9)  Patient's (Body mass index is 28.36 kg/m².) indicates that they are overweight (BMI 25-29.9) with health related conditions that include hypertension, diabetes mellitus, and dyslipidemias . Weight is unchanged. BMI is is above average; BMI management plan is completed. We discussed low calorie, low carb based diet program, portion control, and increasing exercise.           Noncompliance with medication regimen  She continues to be noncompliant with medications.  Again discussed ways to remember to take her medication.  Discussed sequela of uncontrolled diabetes.              Follow Up   Return in about 4 weeks (around 12/19/2024) for diabetes.  Patient was given instructions and counseling regarding her condition or for health maintenance advice. Please see specific information pulled into the AVS if appropriate.

## 2024-11-22 ENCOUNTER — TELEPHONE (OUTPATIENT)
Dept: FAMILY MEDICINE CLINIC | Facility: CLINIC | Age: 59
End: 2024-11-22
Payer: MEDICARE

## 2024-11-22 NOTE — TELEPHONE ENCOUNTER
Caller: Yana Araya    Relationship: Self    Best call back number: [phone]     Which medication are you concerned about:   Insulin Glargine (LANTUS SOLOSTAR) 100 UNIT/ML injection pen [[id number]] (Order [id number])     Who prescribed you this medication:   KIMI PASTOR    When did you start taking this medication: NA    What are your concerns:   REFILL REQUEST DID NOT TRANSMIT TO THE PHARMACY.  PLEASE RESENT PRESCRIPTION

## 2024-11-23 PROBLEM — Z00.00 WELCOME TO MEDICARE PREVENTIVE VISIT: Status: ACTIVE | Noted: 2024-11-23

## 2024-11-23 NOTE — ASSESSMENT & PLAN NOTE
Updated annual wellness visit checklist.  Immunizations discussed. Recommended annual eye and dental exams. Recommended use of seatbelts, sunscreen and smoke detectors in the home.

## 2024-12-13 ENCOUNTER — TELEPHONE (OUTPATIENT)
Dept: FAMILY MEDICINE CLINIC | Facility: CLINIC | Age: 59
End: 2024-12-13
Payer: MEDICARE

## 2024-12-13 NOTE — TELEPHONE ENCOUNTER
Caller: Yana Araya    Relationship: Self    Best call back number: [phone]    What medication are you requesting: EXPOSURE TO THE FLU, SOMETHING FOR THAT    What are your current symptoms: CONGESTION, NOT FEELING ANY GOOD ALL OVER     How long have you been experiencing symptoms: 1 DAY    Have you had these symptoms before:    [x] Yes  [] No    Have you been treated for these symptoms before:   [x] Yes  [] No    If a prescription is needed, what is your preferred pharmacy and phone number: [facility], [place] - [address]  - [phone] [facility] [phone] FX     Additional notes:

## 2024-12-13 NOTE — TELEPHONE ENCOUNTER
Called patient and let her know that Provider is out of office and that she would need to be evaluated. Recommended Urgent Care

## 2024-12-18 DIAGNOSIS — R05.9 COUGH, UNSPECIFIED TYPE: ICD-10-CM

## 2024-12-19 RX ORDER — ALBUTEROL SULFATE 90 UG/1
INHALANT RESPIRATORY (INHALATION)
Qty: 9 G | Refills: 0 | Status: SHIPPED | OUTPATIENT
Start: 2024-12-19

## 2025-01-15 ENCOUNTER — HOSPITAL ENCOUNTER (OUTPATIENT)
Dept: MRI IMAGING | Facility: HOSPITAL | Age: 60
Discharge: HOME | End: 2025-01-15
Admitting: NURSE PRACTITIONER
Payer: MEDICARE

## 2025-01-15 DIAGNOSIS — R41.89 IMPAIRED COGNITION: ICD-10-CM

## 2025-01-15 PROCEDURE — 70551 MRI BRAIN STEM W/O DYE: CPT

## 2025-01-17 ENCOUNTER — OFFICE VISIT (OUTPATIENT)
Dept: FAMILY MEDICINE CLINIC | Facility: CLINIC | Age: 60
End: 2025-01-17
Payer: MEDICARE

## 2025-01-17 VITALS
HEART RATE: 93 BPM | HEIGHT: 65 IN | BODY MASS INDEX: 28.09 KG/M2 | OXYGEN SATURATION: 97 % | TEMPERATURE: 97.3 F | DIASTOLIC BLOOD PRESSURE: 73 MMHG | RESPIRATION RATE: 18 BRPM | SYSTOLIC BLOOD PRESSURE: 104 MMHG | WEIGHT: 168.6 LBS

## 2025-01-17 DIAGNOSIS — R90.89 ABNORMAL FINDING ON MRI OF BRAIN: Primary | ICD-10-CM

## 2025-01-17 DIAGNOSIS — G89.29 CHRONIC NECK PAIN: ICD-10-CM

## 2025-01-17 DIAGNOSIS — M54.2 CHRONIC NECK PAIN: ICD-10-CM

## 2025-01-17 DIAGNOSIS — R41.89 IMPAIRED COGNITION: ICD-10-CM

## 2025-01-17 DIAGNOSIS — E78.5 DYSLIPIDEMIA: ICD-10-CM

## 2025-01-17 DIAGNOSIS — R42 DIZZY: ICD-10-CM

## 2025-01-17 DIAGNOSIS — E11.40 TYPE 2 DIABETES MELLITUS WITH DIABETIC NEUROPATHY, WITH LONG-TERM CURRENT USE OF INSULIN: ICD-10-CM

## 2025-01-17 DIAGNOSIS — I25.10 CORONARY ARTERY DISEASE INVOLVING NATIVE CORONARY ARTERY OF NATIVE HEART WITHOUT ANGINA PECTORIS: ICD-10-CM

## 2025-01-17 DIAGNOSIS — M54.12 CERVICAL RADICULOPATHY: ICD-10-CM

## 2025-01-17 DIAGNOSIS — I73.9 PVD (PERIPHERAL VASCULAR DISEASE): ICD-10-CM

## 2025-01-17 DIAGNOSIS — Z79.4 TYPE 2 DIABETES MELLITUS WITH DIABETIC NEUROPATHY, WITH LONG-TERM CURRENT USE OF INSULIN: ICD-10-CM

## 2025-01-17 NOTE — PATIENT INSTRUCTIONS
The [facility] Scheduling Department will contact you to schedule appointment with neurologist.  If you would like to schedule or verify your appointment, you can call [facility] Scheduling at [phone].

## 2025-01-17 NOTE — PROGRESS NOTES
Chief Complaint  Follow-up (On MRI brain w/o done on 1/15/2025), Diabetes, and Hyperlipidemia    Subjective          Yana is a 59 y.o. female  who presents to [facility]     Patient Care Team:  Melania Damico APRN as PCP - General (Family Medicine)  Sadiq, Darian Kerns MD as Cardiologist (Interventional Cardiology)  Rey Shaw MD as Consulting Physician (Pulmonary Disease)  Matt Damian MD as Surgeon (Thoracic Surgery)  Mer Alvarado (Optometry)     History of Present Illness  She is here to follow up on brain MRI w/o on 1/15/2025    ++++++++++++++++++++++++    Patient presents for follow-up of diabetes  This is an established problem.  Not controlled.  Interim treatment changes: She has been trying to take medicines daily and she has been checking her blood sugar.  Spine surgeon would not do surgery since her diabetes was not controlled.   She has cut back on soda Big Red  Current medications: Jardiance 25 mg daily and Lantus 35 units daily and metformin 1000 mg BID  Checking blood sugar at home?  Yes   Hgba1c 15 % on 11/21/2024  Diabetic eye exam: 6/13/2024     ++++++++++++++++++++++++     Patient presents for follow-up of hyperlipidemia  This is an established problem  Interim treatment changes: none  Current medications: atorvastatin 80 mg daily  Lipid panel 10/23/2024     ++++++++++++++++++++++++      Patient presents for follow-up of CAD and PVD  This is an established problem  Interim treatment changes: none  Current medications: Eliquis, Plavix  She was referred to vascular surgeon.  She saw Dr. Damian on 9/16/2024 and will follow up with him in 6 months  Sees cardiology, Dr. Babcock.  Last appointment on 12/9/2024.  She has an appt to see him on 7/7/2025     ++++++++++++++++++++++++    She saw Adan Verdin MD at ortho spine.  She was going to have back surgery but was told she has to get her blood sugars under control.     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  She has a follow up in February 2025    Chronic neck pain and bilateral shoulder pain  She had xrays of cervical spine and left shoulder on 3/2/2023.    She states she had PT and it made her pain worse.      Yana Araya  has a past medical history of Anxiety, CAD (coronary artery disease), Depression, Diabetes, Diabetic retinopathy (08/22/2023), Difficulty walking, Dyslipidemia, Fibromyalgia, History of inferior wall myocardial infarction, Hyperlipidemia, MI (myocardial infarction), Multinodular thyroid (02/01/2024), Neuropathy in diabetes, Orthostatic hypotension, Paroxysmal atrial flutter, Peripheral vascular disease, Pulmonary emphysema (3/17/2024), and Sleep apnea.      Review of Systems   Respiratory:  Negative for cough and shortness of breath.    Cardiovascular:  Negative for chest pain.   Musculoskeletal:  Positive for back pain and neck pain.   Neurological:  Positive for dizziness, weakness (left arm and leg) and numbness.        Family History   Problem Relation Age of Onset    Diabetes Mother     Cancer Mother     No Known Problems Father     Heart disease Sister     Breast cancer Maternal Aunt     Breast cancer Paternal Aunt     Ovarian cancer Cousin         Past Surgical History:   Procedure Laterality Date    CATARACT EXTRACTION Bilateral 10/23/2023    and 10/30/2023    COLONOSCOPY  04/25/2014    polyps; Dr. Welsh    COLONOSCOPY N/A 12/14/2023    Procedure: COLONOSCOPY with cold forcep biopsy x2 areas and hot snare polypectomy x1;  Surgeon: Toribio Welsh MD;  Location: [facility] END[facility];  Service: Gastroenterology;  Laterality: N/A;  Post-colon polyp    CORONARY STENT PLACEMENT  06/09/2021    FOOT SURGERY Left 06/28/2022    Left first MTP J arthroplasty without implant/Ochoa arthroplasty; Plantar left great toe ulceration debridement with skin graft application; Dr. Tyree Velasco at [facility]    HYSTERECTOMY  04/23/2002      Sonja; due to abnormal cervical cells and heavy bleeding    SALIVARY GLAND EXCISION      TONSILLECTOMY          Social History     Socioeconomic History    Marital status:     Number of children: 2    Highest education level: 11th grade   Tobacco Use    Smoking status: Every Day     Current packs/day: 1.00     Average packs/day: 1 pack/day for 41.0 years (41.0 ttl pk-yrs)     Types: Cigarettes     Start date: 1984     Passive exposure: Current    Smokeless tobacco: Never   Vaping Use    Vaping status: Never Used   Substance and Sexual Activity    Alcohol use: Never    Drug use: Never    Sexual activity: Not Currently     Partners: Male     Birth control/protection: Hysterectomy        Immunization History   Administered Date(s) Administered    Pneumococcal Conjugate 20-Valent (PCV20) 08/23/2023    Tdap 03/02/2023       Objective       Current Outpatient Medications:     acetaminophen (TYLENOL) 500 MG tablet, Take 2 tablets by mouth Every 6 (Six) Hours As Needed for Mild Pain., Disp: , Rfl:     albuterol sulfate  (90 Base) MCG/ACT inhaler, INHALE 1 TO 2 PUFFS BY MOUTH EVERY 4 TO 6 HOURS AS NEEDED FOR COUGH AND FOR SHORTNESS OF BREATH, Disp: 9 g, Rfl: 0    apixaban (Eliquis) 5 MG tablet tablet, Take 1 tablet by mouth Every 12 (Twelve) Hours. PLEASE CALL OFFICE (Patient taking differently: Take 1 tablet by mouth Every 12 (Twelve) Hours.), Disp: 14 tablet, Rfl: 0    atenolol (TENORMIN) 25 MG tablet, Take 1 tablet by mouth Daily. (Patient taking differently: Take 1 tablet by mouth Daily. As needed), Disp: , Rfl:     atorvastatin (LIPITOR) 80 MG tablet, Take 1 tablet by mouth Daily., Disp: 90 tablet, Rfl: 3    Blood Glucose Monitoring Suppl w/Device kit, 1 Device Daily. DX E11.9, Disp: 1 each, Rfl: 0    Carboxymethylcellul-Glycerin (REFRESH TEARS PF OP), Apply  to eye(s) as directed by provider., Disp: , Rfl:     carboxymethylcellulose (REFRESH PLUS) 0.5 % solution, Administer  to both eyes 3 (Three) Times  "a Day As Needed for Dry Eyes., Disp: , Rfl:     clopidogrel (PLAVIX) 75 MG tablet, Take 1 tablet by mouth Daily., Disp: , Rfl:     empagliflozin (JARDIANCE) 25 MG tablet tablet, Take 1 tablet by mouth Daily., Disp: 90 tablet, Rfl: 1    glucose blood test strip, Blood sugar twice daily diabetes E11.9, Disp: 100 each, Rfl: 12    Insulin Glargine (LANTUS SOLOSTAR) 100 UNIT/ML injection pen, Inject 35 Units under the skin into the appropriate area as directed Daily. (Patient taking differently: Inject 35 Units under the skin into the appropriate area as directed Daily. Pt taking 30 Units), Disp: 15 mL, Rfl: 1    Lancets misc, 1 container 2 (Two) Times a Day. DX E11.9, Disp: 100 each, Rfl: 2    nystatin (MYCOSTATIN) 921001 UNIT/GM cream, Apply 1 application  topically to the appropriate area as directed 2 (Two) Times a Day. (Patient taking differently: Apply 1 Application topically to the appropriate area as directed As Needed.), Disp: 60 g, Rfl: 1    PARoxetine (PAXIL) 40 MG tablet, Take 1 tablet by mouth Daily., Disp: 90 tablet, Rfl: 1    valACYclovir (VALTREX) 500 MG tablet, Take 1 tablet by mouth 2 (Two) Times a Day., Disp: , Rfl:     gabapentin (NEURONTIN) 300 MG capsule, Take 1 capsule by mouth 3 (Three) Times a Day. (Patient not taking: Reported on 1/17/2025), Disp: , Rfl:     isosorbide mononitrate (IMDUR) 30 MG 24 hr tablet, Take 1 tablet by mouth Daily. (Patient not taking: Reported on 6/28/2024), Disp: , Rfl:     metFORMIN (GLUCOPHAGE) 1000 MG tablet, Take 1 tablet by mouth 2 (Two) Times a Day With Meals. (Patient not taking: Reported on 1/17/2025), Disp: 180 tablet, Rfl: 1    pantoprazole (PROTONIX) 40 MG EC tablet, Take 1 tablet by mouth Daily., Disp: 30 tablet, Rfl: 3    Vital Signs:      /73   Pulse 93   Temp 97.3 °F (36.3 °C) (Temporal)   Resp 18   Ht 165.1 cm (65\")   Wt 76.5 kg (168 lb 9.6 oz)   SpO2 97%   BMI 28.06 kg/m²     Vitals:    01/17/25 1422   BP: 104/73   Pulse: 93   Resp: 18 " "  Temp: 97.3 °F (36.3 °C)   TempSrc: Temporal   SpO2: 97%   Weight: 76.5 kg (168 lb 9.6 oz)   Height: 165.1 cm (65\")      Physical Exam  Vitals reviewed.   Constitutional:       General: She is not in acute distress.     Appearance: Normal appearance.      Comments: Using cane   HENT:      Head: Normocephalic and atraumatic.      Right Ear: Tympanic membrane, ear canal and external ear normal.      Left Ear: Tympanic membrane, ear canal and external ear normal.      Mouth/Throat:      Mouth: Mucous membranes are moist.      Pharynx: Oropharynx is clear.   Eyes:      General: No scleral icterus.     Conjunctiva/sclera: Conjunctivae normal.   Neck:      Thyroid: No thyromegaly.   Cardiovascular:      Rate and Rhythm: Normal rate and regular rhythm.      Heart sounds: Normal heart sounds.   Pulmonary:      Effort: Pulmonary effort is normal. No respiratory distress.      Breath sounds: Normal breath sounds.   Musculoskeletal:      Left shoulder: Normal.      Cervical back: Neck supple.      Right lower leg: No edema.      Left lower leg: No edema.   Lymphadenopathy:      Cervical: No cervical adenopathy.   Skin:     General: Skin is warm and dry.   Neurological:      Mental Status: She is alert and oriented to person, place, and time.   Psychiatric:         Mood and Affect: Mood normal.         Behavior: Behavior normal.        Result Review :   The following data was reviewed by: MAGNUS Ross on 01/17/2025:  Common labs          10/23/2024    07:55 11/21/2024    10:47 1/17/2025    00:00   Common Labs   Glucose 304      BUN 12      Creatinine 0.60      Sodium 138      Potassium 4.2      Chloride 98      Calcium 9.1      Total Protein 5.7      Albumin 3.7      Total Bilirubin 0.3      Alkaline Phosphatase 133      AST (SGOT) 19      ALT (SGPT) 31      WBC 10.7      Hemoglobin 15.0      Hematocrit 47.9      Platelets 185      Total Cholesterol 137      Triglycerides 145      HDL Cholesterol 53      LDL " Cholesterol  59      Hemoglobin A1C  15.0     Microalbumin, Urine   <3.0      ++++++++++++++++++++++++    MRI BRAIN WO CONTRAST     Date of Exam: 1/15/2025 10:36 AM EST     Indication: impaired cognition.     Comparison: None available.     Technique:  Routine multiplanar/multisequence sequence images of the brain were obtained without contrast administration.        Findings: The ventricles are normal in size and midline. There are multiple bilateral periventricular and subcortical T2/FLAIR white matter hyperintensities compatible with chronic microvascular ischemic changes. Other demyelinating condition not   excluded. Also mild hyperintense signal changes in the randy.  There is no diffusion restriction to suggest acute infarct. There is no evidence of acute or chronic intracranial hemorrhage. No mass effect or midline shift. No abnormal extra-axial collections. The major vascular flow voids appear intact. The basal   ganglia, brainstem and cerebellum appear within normal limits. Calvarial and superficial soft tissue signal is within normal limits. Orbits appear unremarkable. The paranasal sinuses and the mastoid air cells appear well aerated. Midline structures are   intact.         IMPRESSION:  Impression:  1.No acute intracranial abnormality.  2.Bilateral periventricular T2/FLAIR white matter hyperintensities most commonly representing chronic microvascular ischemic changes. Cannot exclude other demyelinating condition.     Electronically Signed: Adal Acosta MD    1/15/2025 2:21 PM EST    Workstation ID: [id number]    ++++++++++++++++++++++++              Assessment and Plan    Diagnoses and all orders for this visit:    1. Abnormal finding on MRI of brain (Primary)  Comments:  Brain MRI cannot exclude other demyelinating condition.  Refer to neurology.  Orders:  -     Ambulatory Referral to Neurology    2. Impaired cognition  Comments:  Refer to neurology  Orders:  -     Ambulatory Referral to  Neurology    3. Dizzy  -     Ambulatory Referral to Neurology    4. Type 2 diabetes mellitus with diabetic neuropathy, with long-term current use of insulin  Overview:  Hgba1c 14 % on 8/23/2023  Hgba1c 13.1 % on 12/15/2023  Hgba1c 15 % on 3/15/2024  Hgba1c 12.6 % on 6/28/2024  Hgba1c 15 % on 11/21/2024    Assessment & Plan:  Chronic problem.  Uncontrolled.  She reports trying to take medicines daily but still misses some doses.  Continue monitoring blood sugar.   Continue Lantus 35 units daily, metformin 1000 mg BID and Jardiance 25 mg daily.  She would like to reschedule appointment with endocrinology.  Follow up 1 month to recheck hgba1c    Orders:  -     Microalbumin / Creatinine Urine Ratio - Urine, Clean Catch  -     Ambulatory Referral to Endocrinology    5. Dyslipidemia  Assessment & Plan:  Chronic stable problem.  Continue atorvastatin 80 mg daily.    Recheck fasting lipid panel in October 2025      6. PVD (peripheral vascular disease)  Overview:  Per cardiology note - She had a CTA with run off. This documents CTA with borderline multifocal stenosis in the L common iliac artery, R > L with diffuse SFA disease, three vessel run off bilaterally, severe nolvia superficial varicosites the majority appeared to be arising from the bilateral greater saphenous veins, the right common femoral artery may represent dissection flap vs. Residual chronic thrombus. Dr. Menard spoke with Dr. Babcock. She is suppose to have surgery but Medicaid will not approve.        Seeing vascular surgery, Dr. Damian    Assessment & Plan:  Chronic stable problem.  Follow up with vascular surgery, Dr. Damian, as scheduled.      7. Coronary artery disease involving native coronary artery of native heart without angina pectoris  Overview:  Sees Dr. Darian Babcock    Assessment & Plan:  Chronic stable problem.  Follow up with cardiology as scheduled.      8. Chronic neck pain  -     MRI Cervical Spine Without Contrast; Future    9. Cervical  radiculopathy  Assessment & Plan:  Ordered MRI cervical spine    Orders:  -     MRI Cervical Spine Without Contrast; Future      This medical care serves as the continuing focal point of all needed health care services.         Follow Up   Return in about 4 weeks (around 2/14/2025) for Diabetes.  Patient was given instructions and counseling regarding her condition or for health maintenance advice. Please see specific information pulled into the AVS if appropriate.    Patient Instructions   The [facility] Scheduling Department will contact you to schedule appointment with neurologist.  If you would like to schedule or verify your appointment, you can call [facility] Scheduling at [phone].

## 2025-01-18 LAB
ALBUMIN/CREAT UR: <10 MG/G CREAT (ref 0–29)
CREAT UR-MCNC: 29.3 MG/DL
MICROALBUMIN UR-MCNC: <3 UG/ML

## 2025-01-19 PROBLEM — G89.29 CHRONIC NECK PAIN: Status: ACTIVE | Noted: 2025-01-19

## 2025-01-19 PROBLEM — M54.2 CHRONIC NECK PAIN: Status: ACTIVE | Noted: 2025-01-19

## 2025-01-19 PROBLEM — M54.12 CERVICAL RADICULOPATHY: Status: ACTIVE | Noted: 2025-01-19

## 2025-01-20 NOTE — ASSESSMENT & PLAN NOTE
Chronic problem.  Uncontrolled.  She reports trying to take medicines daily but still misses some doses.  Continue monitoring blood sugar.   Continue Lantus 35 units daily, metformin 1000 mg BID and Jardiance 25 mg daily.  She would like to reschedule appointment with endocrinology.  Follow up 1 month to recheck hgba1c

## 2025-02-10 NOTE — ASSESSMENT & PLAN NOTE
Problem: Diabetes  Goal: Achieves glycemic balance  Description: Goal is to maintain blood sugar within range with no episodes of hypoglycemia  2/10/2025 0329 by Maureen Mancera RN  Outcome: Monitoring/Evaluating progress  2/10/2025 0326 by Maureen Mancera RN  Outcome: Monitoring/Evaluating progress  Goal: Verbalizes/demonstrates understanding of NEW diagnosis of diabetes and management  Description: Document on Patient Education Activity  2/10/2025 0329 by Maureen Mancera RN  Outcome: Monitoring/Evaluating progress  2/10/2025 0326 by Maureen Mancera RN  Outcome: Monitoring/Evaluating progress  Goal: Verbalizes understanding of diabetes management including how to use HbA1C to evaluate status of blood sugar over time (Diabetes is NOT a new diagnosis)  Description: Diabetes Education  2/10/2025 0329 by Maureen Mancera RN  Outcome: Monitoring/Evaluating progress  2/10/2025 0326 by Maureen Mancera RN  Outcome: Monitoring/Evaluating progress  Goal: Demonstrates ability to self-administer insulin  Description: Document on Patient Education Activity  2/10/2025 0329 by Maureen Mancera RN  Outcome: Monitoring/Evaluating progress  2/10/2025 0326 by Maureen Mancera RN  Outcome: Monitoring/Evaluating progress     Problem: Pain  Goal: Acceptable pain level achieved/maintained at rest using appropriate pain scale for the patient  2/10/2025 0329 by Maureen Mancera RN  Outcome: Monitoring/Evaluating progress  2/10/2025 0326 by Maureen Mancera RN  Outcome: Monitoring/Evaluating progress  Goal: Acceptable pain level achieved/maintained with activity using appropriate pain scale for the patient  2/10/2025 0329 by Maureen Mancera RN  Outcome: Monitoring/Evaluating progress  2/10/2025 0326 by Maureen Mancera RN  Outcome: Monitoring/Evaluating progress  Goal: Acceptable pain level achieved/maintained without oversedation  2/10/2025 0329 by Maureen Mancera RN  Outcome: Monitoring/Evaluating progress  2/10/2025  Refer to vascular surgery   0326 by Maureen Mancera, RN  Outcome: Monitoring/Evaluating progress

## 2025-02-16 DIAGNOSIS — F32.A DEPRESSION, UNSPECIFIED DEPRESSION TYPE: ICD-10-CM

## 2025-02-16 RX ORDER — PAROXETINE 40 MG/1
40 TABLET, FILM COATED ORAL DAILY
Qty: 90 TABLET | Refills: 0 | Status: SHIPPED | OUTPATIENT
Start: 2025-02-16

## 2025-02-19 ENCOUNTER — TELEPHONE (OUTPATIENT)
Dept: FAMILY MEDICINE CLINIC | Facility: CLINIC | Age: 60
End: 2025-02-19

## 2025-02-19 DIAGNOSIS — I25.10 CORONARY ARTERY DISEASE INVOLVING NATIVE CORONARY ARTERY OF NATIVE HEART WITHOUT ANGINA PECTORIS: ICD-10-CM

## 2025-02-19 DIAGNOSIS — E04.2 MULTIPLE THYROID NODULES: Primary | ICD-10-CM

## 2025-02-19 DIAGNOSIS — R41.89 IMPAIRED COGNITION: ICD-10-CM

## 2025-02-19 DIAGNOSIS — I65.23 BILATERAL CAROTID ARTERY STENOSIS: ICD-10-CM

## 2025-02-19 DIAGNOSIS — I73.9 PVD (PERIPHERAL VASCULAR DISEASE): ICD-10-CM

## 2025-03-04 ENCOUNTER — TELEPHONE (OUTPATIENT)
Dept: FAMILY MEDICINE CLINIC | Facility: CLINIC | Age: 60
End: 2025-03-04
Payer: MEDICARE

## 2025-03-04 ENCOUNTER — OFFICE VISIT (OUTPATIENT)
Dept: FAMILY MEDICINE CLINIC | Facility: CLINIC | Age: 60
End: 2025-03-04
Payer: MEDICARE

## 2025-03-04 VITALS
WEIGHT: 173.8 LBS | TEMPERATURE: 97.8 F | BODY MASS INDEX: 28.96 KG/M2 | DIASTOLIC BLOOD PRESSURE: 76 MMHG | OXYGEN SATURATION: 99 % | SYSTOLIC BLOOD PRESSURE: 122 MMHG | RESPIRATION RATE: 18 BRPM | HEART RATE: 95 BPM | HEIGHT: 65 IN

## 2025-03-04 DIAGNOSIS — Z79.4 TYPE 2 DIABETES MELLITUS WITH HYPERGLYCEMIA, WITH LONG-TERM CURRENT USE OF INSULIN: Primary | ICD-10-CM

## 2025-03-04 DIAGNOSIS — Z72.0 TOBACCO USER: ICD-10-CM

## 2025-03-04 DIAGNOSIS — J06.9 VIRAL UPPER RESPIRATORY TRACT INFECTION: ICD-10-CM

## 2025-03-04 DIAGNOSIS — I10 ESSENTIAL HYPERTENSION: ICD-10-CM

## 2025-03-04 DIAGNOSIS — J30.1 SEASONAL ALLERGIC RHINITIS DUE TO POLLEN: ICD-10-CM

## 2025-03-04 DIAGNOSIS — E11.65 TYPE 2 DIABETES MELLITUS WITH HYPERGLYCEMIA, WITH LONG-TERM CURRENT USE OF INSULIN: Primary | ICD-10-CM

## 2025-03-04 DIAGNOSIS — E66.3 OVERWEIGHT (BMI 25.0-29.9): ICD-10-CM

## 2025-03-04 LAB
EXPIRATION DATE: ABNORMAL
EXPIRATION DATE: NORMAL
FLUAV AG UPPER RESP QL IA.RAPID: NOT DETECTED
FLUBV AG UPPER RESP QL IA.RAPID: NOT DETECTED
HBA1C MFR BLD: 13.3 % (ref 4.5–5.7)
INTERNAL CONTROL: NORMAL
Lab: ABNORMAL
Lab: NORMAL
SARS-COV-2 AG UPPER RESP QL IA.RAPID: NOT DETECTED

## 2025-03-04 RX ORDER — ATENOLOL 25 MG/1
25 TABLET ORAL DAILY
Qty: 90 TABLET | Refills: 3 | Status: SHIPPED | OUTPATIENT
Start: 2025-03-04

## 2025-03-04 RX ORDER — MONTELUKAST SODIUM 10 MG/1
10 TABLET ORAL NIGHTLY
Qty: 90 TABLET | Refills: 3 | Status: SHIPPED | OUTPATIENT
Start: 2025-03-04

## 2025-03-04 RX ORDER — LORATADINE 10 MG/1
10 TABLET ORAL DAILY
Qty: 90 TABLET | Refills: 3 | Status: SHIPPED | OUTPATIENT
Start: 2025-03-04

## 2025-03-04 NOTE — ASSESSMENT & PLAN NOTE
Patient's (Body mass index is 28.92 kg/m².) indicates that they are overweight with health conditions that include hypertension, coronary heart disease, diabetes mellitus, and dyslipidemias . Weight is unchanged. BMI is above average; BMI management plan is completed. We discussed portion control and increasing exercise.

## 2025-03-04 NOTE — ASSESSMENT & PLAN NOTE
She has pending apt with Endo 05/2025  She is not taking her Lantus dose is lowered to 30 units given her concern for hypoglycemia  Monitor sugars and follow  She declines a CGM she is encouraged to consider.   Consequences of poor glucose control discussed and understood.

## 2025-03-04 NOTE — PROGRESS NOTES
Chief Complaint  URI, Hypertension, and Diabetes    Subjective     CC  Problem List  Visit Diagnosis   Encounters  Notes  Medications  Labs  Result Review Imaging  Media    Yana Araya presents to [facility] for   URI   This is a new problem. The current episode started in the past 7 days. The problem has been unchanged. There has been no fever. Associated symptoms include coughing, rhinorrhea, sneezing and a sore throat. Pertinent negatives include no abdominal pain, chest pain, congestion, diarrhea, dysuria, ear pain, headaches, joint pain, joint swelling, nausea, neck pain, plugged ear sensation, rash, sinus pain or vomiting. She has tried nothing for the symptoms.   Hypertension  This is a chronic problem. The current episode started more than 1 year ago. The problem has been stable since onset. Pertinent negatives include no anxiety, blurred vision, chest pain, headaches, malaise/fatigue, neck pain, palpitations, peripheral edema, shortness of breath or sweats. Risk factors for coronary artery disease include dyslipidemia, diabetes mellitus and post-menopausal state. Past treatments include beta blockers. Current antihypertension treatment includes beta blockers. The current treatment provides significant improvement. There are no compliance problems.  Hypertensive end-organ damage includes CAD/MI and retinopathy.   Diabetes  She presents for her follow-up diabetic visit. She has type 2 diabetes mellitus. Pertinent negatives for hypoglycemia include no confusion, dizziness, headaches, hunger, mood changes, nervousness/anxiousness, sleepiness, speech difficulty or sweats. Associated symptoms include fatigue. Pertinent negatives for diabetes include no blurred vision, no chest pain, no foot ulcerations, no polydipsia, no polyuria, no visual change, no weakness and no weight loss. Diabetic complications include retinopathy. Risk factors for coronary artery disease  "include diabetes mellitus, dyslipidemia, hypertension and post-menopausal. Current diabetic treatment includes oral agent (dual therapy) and insulin injections. She is following a generally healthy diet. (Patient does not check glucose at home)       Review of Systems   Constitutional:  Positive for fatigue. Negative for fever, malaise/fatigue and unexpected weight loss.   HENT:  Positive for rhinorrhea, sneezing and sore throat. Negative for congestion and ear pain.    Eyes:  Negative for blurred vision.   Respiratory:  Positive for cough. Negative for shortness of breath.    Cardiovascular:  Negative for chest pain, palpitations and leg swelling.   Gastrointestinal:  Negative for abdominal pain, constipation, diarrhea, nausea and vomiting.   Endocrine: Negative for cold intolerance, heat intolerance, polydipsia and polyuria.   Genitourinary:  Negative for dysuria.   Musculoskeletal:  Negative for joint pain and neck pain.   Skin:  Negative for rash.   Neurological:  Negative for dizziness, speech difficulty, weakness and confusion.   Hematological:  Negative for adenopathy. Does not bruise/bleed easily.   Psychiatric/Behavioral:  The patient is not nervous/anxious.         Objective   Vital Signs:   /76 (BP Location: Right arm, Patient Position: Sitting, Cuff Size: Adult)   Pulse 95   Temp 97.8 °F (36.6 °C) (Temporal)   Resp 18   Ht 165.1 cm (65\")   Wt 78.8 kg (173 lb 12.8 oz)   SpO2 99%   BMI 28.92 kg/m²     Physical Exam  Constitutional:       General: She is not in acute distress.  Cardiovascular:      Rate and Rhythm: Normal rate and regular rhythm.      Heart sounds: No murmur heard.  Pulmonary:      Effort: Pulmonary effort is normal.      Breath sounds: Normal breath sounds. No wheezing.   Abdominal:      General: Abdomen is flat.      Palpations: Abdomen is soft. There is no mass.      Tenderness: There is no abdominal tenderness.   Musculoskeletal:      Cervical back: Neck supple.      Right " lower leg: No edema.      Left lower leg: No edema.   Lymphadenopathy:      Cervical: No cervical adenopathy.   Skin:     Findings: No rash.   Neurological:      Mental Status: She is alert.        Result Review :Labs  Result Review  Imaging  Med Tab  Media                 Assessment and Plan CC Problem List  Visit Diagnosis  ROS  Review (Popup)  Health Maintenance  Quality  BestPractice  Medications  SmartSets  SnapShot Encounters  Media  Problem List Items Addressed This Visit          Unprioritized    Type 2 diabetes mellitus with hyperglycemia, with long-term current use of insulin - Primary    Overview     A1c        13,3 % 03/04/2025  Hgba1c 14 % on 8/23/2023  Hgba1c 13.1 % on 12/15/2023  Hgba1c 15 % on 3/15/2024  Hgba1c 12.6 % on 6/28/2024  Hgba1c 15 % on 11/21/2024         Current Assessment & Plan       She has pending apt with Endo 05/2025  She is not taking her Lantus dose is lowered to 30 units given her concern for hypoglycemia  Monitor sugars and follow  She declines a CGM she is encouraged to consider.   Consequences of poor glucose control discussed and understood.          Relevant Medications    Insulin Glargine (LANTUS SOLOSTAR) 100 UNIT/ML injection pen    Other Relevant Orders    POC Glycosylated Hemoglobin (Hb A1C) (Completed)    Essential hypertension    Current Assessment & Plan     Hypertension is stable and controlled  Continue current treatment regimen.  Blood pressure will be reassessed in 3 months.         Relevant Medications    atenolol (TENORMIN) 25 MG tablet    Tobacco user    Overview     1 ppd x 30 yrs         Current Assessment & Plan     Cessation encouraged techniques discussed, consequences of continued use discussed.   She is minimally motivated at present.          Overweight (BMI 25.0-29.9)    Current Assessment & Plan     Patient's (Body mass index is 28.92 kg/m².) indicates that they are overweight with health conditions that include hypertension, coronary  heart disease, diabetes mellitus, and dyslipidemias . Weight is unchanged. BMI is above average; BMI management plan is completed. We discussed portion control and increasing exercise.          Seasonal allergic rhinitis due to pollen    Current Assessment & Plan     Exacerbation of sxs.   Begin max Rx and hopefully sxs  improve         Relevant Medications    montelukast (SINGULAIR) 10 MG tablet    loratadine (CLARITIN) 10 MG tablet     Other Visit Diagnoses       Viral upper respiratory tract infection        reasurring exam, neg flu, neg covid    Relevant Orders    POCT SARS-CoV-2 + Flu Antigen KARLEE (Completed)            Follow Up Instructions Charge Capture  Follow-up Communications  Return if symptoms worsen or fail to improve.  Patient was given instructions and counseling regarding her condition or for health maintenance advice. Please see specific information pulled into the AVS if appropriate.

## 2025-03-04 NOTE — ASSESSMENT & PLAN NOTE
Cessation encouraged techniques discussed, consequences of continued use discussed.   She is minimally motivated at present.

## 2025-03-04 NOTE — TELEPHONE ENCOUNTER
Caller: Yana Araya    Relationship: Self    Best call back number: [phone]    What medication are you requesting: PLEASE ADVISE    What are your current symptoms: COUGH, RUNNY NOSE, SNEEZING     How long have you been experiencing symptoms: 3 DAYS    Have you had these symptoms before:    [] Yes  [x] No    Have you been treated for these symptoms before:   [] Yes  [x] No    If a prescription is needed, what is your preferred pharmacy and phone number: [facility] [address] [place], [place] [address], [place] - [address] [facility] [address]  - [phone] [facility] [phone] FX     Additional notes:  PLEASE CALL TO ADVISE OR DISCUSS.

## 2025-03-07 ENCOUNTER — TELEPHONE (OUTPATIENT)
Dept: FAMILY MEDICINE CLINIC | Facility: CLINIC | Age: 60
End: 2025-03-07
Payer: MEDICARE

## 2025-03-20 ENCOUNTER — HOSPITAL ENCOUNTER (OUTPATIENT)
Dept: CARDIOLOGY | Facility: HOSPITAL | Age: 60
Discharge: HOME | End: 2025-03-20
Payer: MEDICARE

## 2025-03-20 ENCOUNTER — HOSPITAL ENCOUNTER (OUTPATIENT)
Dept: ULTRASOUND IMAGING | Facility: HOSPITAL | Age: 60
Discharge: HOME | End: 2025-03-20
Payer: MEDICARE

## 2025-03-20 DIAGNOSIS — I65.23 BILATERAL CAROTID ARTERY STENOSIS: ICD-10-CM

## 2025-03-20 DIAGNOSIS — R41.89 IMPAIRED COGNITION: ICD-10-CM

## 2025-03-20 DIAGNOSIS — E04.2 MULTIPLE THYROID NODULES: ICD-10-CM

## 2025-03-20 DIAGNOSIS — I25.10 CORONARY ARTERY DISEASE INVOLVING NATIVE CORONARY ARTERY OF NATIVE HEART WITHOUT ANGINA PECTORIS: ICD-10-CM

## 2025-03-20 DIAGNOSIS — I73.9 PVD (PERIPHERAL VASCULAR DISEASE): ICD-10-CM

## 2025-03-20 LAB
BH CV XLRA MEAS LEFT CAROTID BULB PSV: -78.9 CM/SEC
BH CV XLRA MEAS LEFT DIST CCA EDV: -23 CM/SEC
BH CV XLRA MEAS LEFT DIST CCA PSV: -64.6 CM/SEC
BH CV XLRA MEAS LEFT DIST ICA EDV: -37.3 CM/SEC
BH CV XLRA MEAS LEFT DIST ICA PSV: -83.9 CM/SEC
BH CV XLRA MEAS LEFT ICA/CCA RATIO: 1.1
BH CV XLRA MEAS LEFT PROX CCA EDV: 23 CM/SEC
BH CV XLRA MEAS LEFT PROX CCA PSV: 76.4 CM/SEC
BH CV XLRA MEAS LEFT PROX ECA PSV: -125 CM/SEC
BH CV XLRA MEAS LEFT PROX ICA EDV: -28 CM/SEC
BH CV XLRA MEAS LEFT PROX ICA PSV: -66.5 CM/SEC
BH CV XLRA MEAS LEFT PROX SCLA PSV: 88.8 CM/SEC
BH CV XLRA MEAS LEFT VERTEBRAL A PSV: -58.4 CM/SEC
BH CV XLRA MEAS RIGHT CAROTID BULB PSV: -63.4 CM/SEC
BH CV XLRA MEAS RIGHT DIST CCA EDV: 25.5 CM/SEC
BH CV XLRA MEAS RIGHT DIST CCA PSV: 66.5 CM/SEC
BH CV XLRA MEAS RIGHT DIST ICA EDV: -46 CM/SEC
BH CV XLRA MEAS RIGHT DIST ICA PSV: -107 CM/SEC
BH CV XLRA MEAS RIGHT ICA/CCA RATIO: 1.61
BH CV XLRA MEAS RIGHT PROX CCA EDV: 21.1 CM/SEC
BH CV XLRA MEAS RIGHT PROX CCA PSV: 64.6 CM/SEC
BH CV XLRA MEAS RIGHT PROX ECA PSV: -102 CM/SEC
BH CV XLRA MEAS RIGHT PROX ICA EDV: -30.4 CM/SEC
BH CV XLRA MEAS RIGHT PROX ICA PSV: -77 CM/SEC
BH CV XLRA MEAS RIGHT PROX SCLA PSV: 80.1 CM/SEC
BH CV XLRA MEAS RIGHT VERTEBRAL A PSV: 52.8 CM/SEC

## 2025-03-20 PROCEDURE — 76536 US EXAM OF HEAD AND NECK: CPT

## 2025-03-20 PROCEDURE — 93880 EXTRACRANIAL BILAT STUDY: CPT

## 2025-03-24 ENCOUNTER — TELEPHONE (OUTPATIENT)
Dept: FAMILY MEDICINE CLINIC | Facility: CLINIC | Age: 60
End: 2025-03-24
Payer: MEDICARE

## 2025-03-24 NOTE — TELEPHONE ENCOUNTER
Called and spoke to pt about MRI order.  Pt states she is going to call [facility] and schedule appointment for MRI

## 2025-03-26 DIAGNOSIS — G89.29 CHRONIC NECK PAIN: ICD-10-CM

## 2025-03-26 DIAGNOSIS — M50.30 DDD (DEGENERATIVE DISC DISEASE), CERVICAL: Primary | ICD-10-CM

## 2025-03-26 DIAGNOSIS — M54.12 CERVICAL RADICULOPATHY: ICD-10-CM

## 2025-03-26 DIAGNOSIS — M54.2 CHRONIC NECK PAIN: ICD-10-CM

## 2025-03-26 DIAGNOSIS — M48.00 CENTRAL STENOSIS OF SPINAL CANAL: ICD-10-CM

## 2025-04-04 NOTE — PROGRESS NOTES
Subjective     Chief Complaint   Patient presents with    Neck Pain    Arm Pain         Previous Treatment: PT    HPI: Yana Araya is a 59 y.o. female who presents to clinic with chronic neck pain.  Patient states that she is been having neck pain for the past 10 years.  Patient states that her pain has not gone away and she is ready for evaluation and treatment.  Patient states that she has pain when turning her head to the left.  She also states that she has pain that radiates down her elbows with numbness and tingling that radiates into her fingers, with her index finger being the most affected.  She also reports weakness in her upper extremities.  She states that her left arm is worse than her right arm.  She also admits that she has been having issues with dropping things, picking up objects and her balance.  Patient states that she has had falls but has had not had recent falls within this year.  Patient admits that she does Stiger quite a bit.  Patient states that she has lost control of her bowels, 2 weeks ago being the most recent.  She has addressed it with her primary care provider who has sent her for a colonoscopy.  She denies having any urinary incontinence at this time.  Patient has been taking Tylenol and using heating pad to help relieve her symptoms.  She states that it does help relieve the pain.  She reports no saddle anesthesia at this time.        PMH:  Past Medical History:   Diagnosis Date    Anxiety     Arthritis     CAD (coronary artery disease)     Cataract     Colon polyp     Depression     Diabetes     Diabetic retinopathy 08/22/2023    Difficulty walking     Dyslipidemia     Fibromyalgia     History of inferior wall myocardial infarction     Hyperlipidemia     MI (myocardial infarction)     Multinodular thyroid 02/01/2024    Neuropathy in diabetes     Orthostatic hypotension     Paroxysmal atrial flutter     Peripheral vascular disease     Pulmonary emphysema 03/17/2024    Sleep  apnea          Current Outpatient Medications:     acetaminophen (TYLENOL) 500 MG tablet, Take 2 tablets by mouth Every 6 (Six) Hours As Needed for Mild Pain., Disp: , Rfl:     albuterol sulfate  (90 Base) MCG/ACT inhaler, INHALE 1 TO 2 PUFFS BY MOUTH EVERY 4 TO 6 HOURS AS NEEDED FOR COUGH AND FOR SHORTNESS OF BREATH, Disp: 9 g, Rfl: 0    apixaban (Eliquis) 5 MG tablet tablet, Take 1 tablet by mouth Every 12 (Twelve) Hours. PLEASE CALL OFFICE, Disp: 60 tablet, Rfl: 12    atenolol (TENORMIN) 25 MG tablet, Take 1 tablet by mouth Daily., Disp: 90 tablet, Rfl: 3    atorvastatin (LIPITOR) 80 MG tablet, Take 1 tablet by mouth Daily., Disp: 90 tablet, Rfl: 3    Blood Glucose Monitoring Suppl w/Device kit, 1 Device Daily. DX E11.9, Disp: 1 each, Rfl: 0    Carboxymethylcellul-Glycerin (REFRESH TEARS PF OP), Apply  to eye(s) as directed by provider., Disp: , Rfl:     carboxymethylcellulose (REFRESH PLUS) 0.5 % solution, Administer  to both eyes 3 (Three) Times a Day As Needed for Dry Eyes., Disp: , Rfl:     clopidogrel (PLAVIX) 75 MG tablet, Take 1 tablet by mouth Daily., Disp: , Rfl:     empagliflozin (JARDIANCE) 25 MG tablet tablet, Take 1 tablet by mouth Daily., Disp: 90 tablet, Rfl: 1    glucose blood test strip, Blood sugar twice daily diabetes E11.9, Disp: 100 each, Rfl: 12    Insulin Glargine (LANTUS SOLOSTAR) 100 UNIT/ML injection pen, Inject 30 Units under the skin into the appropriate area as directed Every Night., Disp: 15 mL, Rfl: 12    isosorbide mononitrate (IMDUR) 30 MG 24 hr tablet, Take 1 tablet by mouth Daily., Disp: , Rfl:     Lancets misc, 1 container 2 (Two) Times a Day. DX E11.9, Disp: 100 each, Rfl: 2    loratadine (CLARITIN) 10 MG tablet, Take 1 tablet by mouth Daily., Disp: 90 tablet, Rfl: 3    metFORMIN (GLUCOPHAGE) 1000 MG tablet, Take 1 tablet by mouth 2 (Two) Times a Day With Meals., Disp: 180 tablet, Rfl: 1    montelukast (SINGULAIR) 10 MG tablet, Take 1 tablet by mouth Every Night., Disp:  90 tablet, Rfl: 3    nystatin (MYCOSTATIN) 416519 UNIT/GM cream, Apply 1 application  topically to the appropriate area as directed 2 (Two) Times a Day. (Patient taking differently: Apply 1 Application topically to the appropriate area as directed As Needed.), Disp: 60 g, Rfl: 1    pantoprazole (PROTONIX) 40 MG EC tablet, Take 1 tablet by mouth Daily., Disp: 30 tablet, Rfl: 3    PARoxetine (PAXIL) 40 MG tablet, Take 1 tablet by mouth once daily, Disp: 90 tablet, Rfl: 0    valACYclovir (VALTREX) 500 MG tablet, Take 1 tablet by mouth 2 (Two) Times a Day., Disp: , Rfl:      Allergies   Allergen Reactions    Penicillins Unknown - High Severity and Confusion     Other reaction(s): feel funny    Sulfa Antibiotics Myalgia        Past Surgical History:   Procedure Laterality Date    CARDIAC SURGERY      CATARACT EXTRACTION Bilateral 10/23/2023    and 10/30/2023    COLONOSCOPY  04/25/2014    polyps; Dr. Welsh    COLONOSCOPY N/A 12/14/2023    Procedure: COLONOSCOPY with cold forcep biopsy x2 areas and hot snare polypectomy x1;  Surgeon: Toribio Welsh MD;  Location: [facility];  Service: Gastroenterology;  Laterality: N/A;  Post-colon polyp    CORONARY STENT PLACEMENT  06/09/2021    FOOT SURGERY Left 06/28/2022    Left first MTP J arthroplasty without implant/Ochoa arthroplasty; Plantar left great toe ulceration debridement with skin graft application; Dr. Tyree Velasco at [facility]    HYSTERECTOMY  04/23/2002    Dr. Casillas; due to abnormal cervical cells and heavy bleeding    SALIVARY GLAND EXCISION      TONSILLECTOMY          Family History   Problem Relation Age of Onset    Diabetes Mother     Cancer Mother     Anxiety disorder Mother     Depression Mother     No Known Problems Father     Heart disease Sister     Arthritis Sister     Breast cancer Maternal Aunt     Breast cancer Paternal Aunt     Ovarian cancer Cousin          Social Hx:  Social History     Tobacco Use   Smoking Status Every Day     "Current packs/day: 1.00    Average packs/day: 1 pack/day for 41.3 years (41.3 ttl pk-yrs)    Types: Cigarettes    Start date: 1/1/1984    Passive exposure: Current   Smokeless Tobacco Never      Alcohol Use: Not on file      Social History     Substance and Sexual Activity   Drug Use Never          Review of Systems   Constitutional:  Positive for activity change.   HENT: Negative.     Eyes: Negative.    Respiratory:  Negative for chest tightness and shortness of breath.    Cardiovascular: Negative.    Gastrointestinal: Negative.    Endocrine: Negative.    Genitourinary: Negative.    Musculoskeletal:  Positive for myalgias, neck pain and neck stiffness.        + arm pain   Skin: Negative.    Allergic/Immunologic: Negative.    Neurological:  Positive for numbness.   Hematological: Negative.    Psychiatric/Behavioral: Negative.           Objective     /75   Pulse 83   Resp 18   Ht 165.1 cm (65\")   Wt 76.2 kg (168 lb)   SpO2 97%   BMI 27.96 kg/m²    Body mass index is 27.96 kg/m².      Physical Exam  Vitals reviewed.   Eyes:      Extraocular Movements: Extraocular movements intact.      Conjunctiva/sclera: Conjunctivae normal.      Pupils: Pupils are equal, round, and reactive to light.   Musculoskeletal:         General: Normal range of motion.      Cervical back: Normal range of motion.   Skin:     General: Skin is warm and dry.   Neurological:      General: No focal deficit present.   Psychiatric:         Mood and Affect: Mood normal.         Speech: Speech normal.          Neurological Exam  Mental Status  Awake, alert and oriented to person, place and time. Oriented to person, place and time. Speech is normal. Language is fluent with no aphasia.    Cranial Nerves  CN III, IV, VI: Extraocular movements intact bilaterally. Pupils equal round and reactive to light bilaterally.    Motor  Normal muscle bulk throughout.                                               Right                     Left  Rhomboids   "                          5                           Infraspinatus                          5                           Supraspinatus                       5                           Deltoid                                   5                            Biceps                                   5                           Brachioradialis                      5                            Triceps                                  5                            Wrist flexor                            5                            Wrist extensor                       5                           5 out of 5 left arm.    Sensory  Decree sensation in left arm.    Reflexes    Right pathological reflexes: Felicia's absent.  Left pathological reflexes: Felicia's absent.          Results Review  I personally reviewed and interpreted the images from the following studies:    MRI cervical spine: Patient has moderate canal stenosis at C5-6 with moderate to severe right neuroforaminal stenosis in addition to moderate to severe canal stenosis at C6-7.               Assessment & Plan     MDM: Yana Araya is a 59 y.o. female who presents to clinic for chronic neck pain.  Patient states that she has been having chronic neck pain for the last 10 years and it just has not improved.  Patient states that she has radicular symptoms in her upper extremities including pain and numbness and tingling.  She said her left arm is worse than her right arm.    Patient had an MRI of her cervical spine performed at [facility] that shows moderate canal stenosis at C5-6 and moderate severe canal stenosis at C6-7.  She also has severe right neuroforaminal stenosis at C5-6.    On physical examination patient does have good strength in her upper extremities, she does admit to decree sensation in her right arm.  I did not appreciate a Felicia sign.    At this time I am sending patient to physical therapy in addition to getting an injection at C6 -7.   When she completes these modalities patient is to return back to clinic.  I told her that should she have any new or worsening symptoms she is to call the office right away.  Patient is taking Tylenol for her symptoms.  Due to her heart attack she is unable to take any NSAIDs at this time.  I did not prescribe her any medications during his visit.    I told her that should she have any severe weakness or lack of sensation to go straight to the hospital.  Patient is agreeable to this plan knows to call with any questions or concerns.       Diagnosis Plan   1. Spinal stenosis in cervical region  Ambulatory Referral to Physical Therapy for Evaluation & Treatment    Epidural Block      2. Cervical radiculopathy  Ambulatory Referral to Physical Therapy for Evaluation & Treatment    Epidural Block      3. Neck pain  Ambulatory Referral to Physical Therapy for Evaluation & Treatment    Epidural Block          Return After PT and injection, for Recheck.      Yana Araya  reports that she has been smoking cigarettes. She started smoking about 41 years ago. She has a 41.3 pack-year smoking history. She has been exposed to tobacco smoke. She has never used smokeless tobacco.          This patient was examined wearing appropriate personal protective equipment.            Zaida Ricks PA-C    04/08/25  12:36 EDT      Part of this note may be an electronic transcription/translation of spoken language to printed text using the Dragon Dictation System.

## 2025-04-08 ENCOUNTER — ANCILLARY ORDERS (OUTPATIENT)
Dept: NEUROSURGERY | Facility: CLINIC | Age: 60
End: 2025-04-08

## 2025-04-08 ENCOUNTER — OFFICE VISIT (OUTPATIENT)
Dept: NEUROSURGERY | Facility: CLINIC | Age: 60
End: 2025-04-08
Payer: MEDICARE

## 2025-04-08 ENCOUNTER — HOSPITAL ENCOUNTER (OUTPATIENT)
Dept: CT IMAGING | Facility: HOSPITAL | Age: 60
Discharge: HOME | End: 2025-04-08
Admitting: INTERNAL MEDICINE
Payer: MEDICARE

## 2025-04-08 VITALS
RESPIRATION RATE: 18 BRPM | HEART RATE: 83 BPM | HEIGHT: 65 IN | DIASTOLIC BLOOD PRESSURE: 75 MMHG | OXYGEN SATURATION: 97 % | SYSTOLIC BLOOD PRESSURE: 107 MMHG | BODY MASS INDEX: 27.99 KG/M2 | WEIGHT: 168 LBS

## 2025-04-08 DIAGNOSIS — M54.12 CERVICAL RADICULOPATHY: ICD-10-CM

## 2025-04-08 DIAGNOSIS — J44.9 CHRONIC OBSTRUCTIVE PULMONARY DISEASE, UNSPECIFIED COPD TYPE: ICD-10-CM

## 2025-04-08 DIAGNOSIS — M48.02 SPINAL STENOSIS IN CERVICAL REGION: Primary | ICD-10-CM

## 2025-04-08 DIAGNOSIS — M54.2 NECK PAIN: ICD-10-CM

## 2025-04-08 PROCEDURE — 71250 CT THORAX DX C-: CPT

## 2025-04-09 ENCOUNTER — PATIENT ROUNDING (BHMG ONLY) (OUTPATIENT)
Dept: NEUROSURGERY | Facility: CLINIC | Age: 60
End: 2025-04-09
Payer: MEDICARE

## 2025-04-24 ENCOUNTER — TELEPHONE (OUTPATIENT)
Dept: FAMILY MEDICINE CLINIC | Facility: CLINIC | Age: 60
End: 2025-04-24
Payer: MEDICARE

## 2025-04-24 NOTE — TELEPHONE ENCOUNTER
Caller: Yana Araya    Relationship: Self    Best call back number: [phone]    What are your current symptoms: DIARRHEA - BOWEL INCONTINENCE     Have you had these symptoms before:    [] Yes  [x] No    Have you been treated for these symptoms before:   [] Yes  [x] No    If a prescription is needed, what is your preferred pharmacy and phone number: [facility] [address]  [place], [place] - [address] [facility] [address]  - [phone] [facility] [phone] FX     Additional notes: PATIENT STATES THAT SHE IS UNSURE AS TO WHAT MEDICATION SHE COULD TAKE TO HELP WITH HER SYMPTOMS, BUT SHE WOULD LIKE TO TRY SOMETHING.

## 2025-04-25 ENCOUNTER — OFFICE VISIT (OUTPATIENT)
Dept: FAMILY MEDICINE CLINIC | Facility: CLINIC | Age: 60
End: 2025-04-25
Payer: MEDICARE

## 2025-04-25 VITALS
WEIGHT: 169.4 LBS | RESPIRATION RATE: 20 BRPM | OXYGEN SATURATION: 98 % | SYSTOLIC BLOOD PRESSURE: 121 MMHG | HEIGHT: 65 IN | TEMPERATURE: 97.8 F | BODY MASS INDEX: 28.22 KG/M2 | HEART RATE: 73 BPM | DIASTOLIC BLOOD PRESSURE: 78 MMHG

## 2025-04-25 DIAGNOSIS — K52.9 CHRONIC DIARRHEA: Primary | ICD-10-CM

## 2025-04-25 DIAGNOSIS — Z91.148 NONCOMPLIANCE WITH MEDICATION REGIMEN: ICD-10-CM

## 2025-04-25 DIAGNOSIS — E11.65 TYPE 2 DIABETES MELLITUS WITH HYPERGLYCEMIA, WITH LONG-TERM CURRENT USE OF INSULIN: ICD-10-CM

## 2025-04-25 DIAGNOSIS — R15.9 INCONTINENCE OF FECES, UNSPECIFIED FECAL INCONTINENCE TYPE: ICD-10-CM

## 2025-04-25 DIAGNOSIS — Z79.4 TYPE 2 DIABETES MELLITUS WITH HYPERGLYCEMIA, WITH LONG-TERM CURRENT USE OF INSULIN: ICD-10-CM

## 2025-04-25 DIAGNOSIS — E11.43 DIABETIC AUTONOMIC NEUROPATHY ASSOCIATED WITH TYPE 2 DIABETES MELLITUS: ICD-10-CM

## 2025-04-25 RX ORDER — COLESEVELAM 180 1/1
1250 TABLET ORAL 2 TIMES DAILY WITH MEALS
Qty: 120 TABLET | Refills: 5 | Status: SHIPPED | OUTPATIENT
Start: 2025-04-25

## 2025-04-25 RX ORDER — WHEAT DEXTRIN 3 G/3.8 G
POWDER (GRAM) ORAL
Qty: 730 G | Refills: 5 | Status: SHIPPED | OUTPATIENT
Start: 2025-04-25

## 2025-04-25 RX ORDER — LOPERAMIDE HYDROCHLORIDE 2 MG/1
2 TABLET ORAL 4 TIMES DAILY PRN
Qty: 60 TABLET | Refills: 1 | Status: SHIPPED | OUTPATIENT
Start: 2025-04-25

## 2025-04-25 NOTE — PROGRESS NOTES
Chief Complaint  Diarrhea and Nausea    Subjective          Yana is a 59 y.o. female  who presents to [facility]     Patient Care Team:  Melania Damico APRN as PCP - General (Family Medicine)  Darian Babcock MD as Cardiologist (Interventional Cardiology)  Rey Shaw MD as Consulting Physician (Pulmonary Disease)  Matt Damian MD as Surgeon (Thoracic Surgery)  Mer Alvarado (Optometry)     History of Present Illness  Chronic diarrhea    Location: chronic diarrhea  Quality: stools are loose, between [place] stool 6 and 7  Severity: severe  Duration: for about 2 years, getting worse  Timing: daily  Context: colonoscopy 12/14/2023 and biopsy negative for colitis  She has uncontrolled diabetes.  She has an appointment next week to see endocrinology.  Alleviating factors: nothing makes better (she has not tried anything)  Aggravating factors: nothing makes worse  Associated Symptoms: + incontience stool    Yana Araya  has a past medical history of Anxiety, Arthritis, CAD (coronary artery disease), Cataract, Colon polyp, Depression, Diabetes, Diabetic retinopathy (08/22/2023), Difficulty walking, Dyslipidemia, Fibromyalgia, History of inferior wall myocardial infarction, Hyperlipidemia, MI (myocardial infarction), Multinodular thyroid (02/01/2024), Neuropathy in diabetes, Orthostatic hypotension, Paroxysmal atrial flutter, Peripheral vascular disease, Pulmonary emphysema (03/17/2024), and Sleep apnea.      Review of Systems   Gastrointestinal:  Positive for abdominal pain, diarrhea and nausea. Negative for blood in stool and vomiting.   Genitourinary:  Negative for dysuria and hematuria.   Musculoskeletal:  Positive for back pain.        Family History   Problem Relation Age of Onset    Diabetes Mother     Cancer Mother     Anxiety disorder Mother     Depression Mother     No Known Problems Father     Heart disease Sister     Arthritis Sister     Breast  cancer Maternal Aunt     Breast cancer Paternal Aunt     Ovarian cancer Cousin         Past Surgical History:   Procedure Laterality Date    CARDIAC SURGERY      CATARACT EXTRACTION Bilateral 10/23/2023    and 10/30/2023    COLONOSCOPY  04/25/2014    polyps; Dr. Welsh    COLONOSCOPY N/A 12/14/2023    Procedure: COLONOSCOPY with cold forcep biopsy x2 areas and hot snare polypectomy x1;  Surgeon: Toribio Welsh MD;  Location: [facility] ENDOSCOPY;  Service: Gastroenterology;  Laterality: N/A;  Post-colon polyp    CORONARY STENT PLACEMENT  06/09/2021    FOOT SURGERY Left 06/28/2022    Left first MTP J arthroplasty without implant/Ochoa arthroplasty; Plantar left great toe ulceration debridement with skin graft application; Dr. Tyree Velasco at [facility]    HYSTERECTOMY  04/23/2002    Dr. Casillas; due to abnormal cervical cells and heavy bleeding    SALIVARY GLAND EXCISION      TONSILLECTOMY          Social History     Socioeconomic History    Marital status:     Number of children: 2    Highest education level: 11th grade   Tobacco Use    Smoking status: Every Day     Current packs/day: 1.00     Average packs/day: 1 pack/day for 41.3 years (41.3 ttl pk-yrs)     Types: Cigarettes     Start date: 1/1/1984     Passive exposure: Current    Smokeless tobacco: Never   Vaping Use    Vaping status: Never Used   Substance and Sexual Activity    Alcohol use: Never    Drug use: Never    Sexual activity: Not Currently     Partners: Male     Birth control/protection: Hysterectomy        Immunization History   Administered Date(s) Administered    Pneumococcal Conjugate 20-Valent (PCV20) 08/23/2023    Tdap 03/02/2023       Objective       Current Outpatient Medications:     acetaminophen (TYLENOL) 500 MG tablet, Take 2 tablets by mouth Every 6 (Six) Hours As Needed for Mild Pain., Disp: , Rfl:     albuterol sulfate  (90 Base) MCG/ACT inhaler, INHALE 1 TO 2 PUFFS BY MOUTH EVERY 4 TO 6 HOURS AS NEEDED FOR COUGH  AND FOR SHORTNESS OF BREATH, Disp: 9 g, Rfl: 0    apixaban (Eliquis) 5 MG tablet tablet, Take 1 tablet by mouth Every 12 (Twelve) Hours. PLEASE CALL OFFICE, Disp: 60 tablet, Rfl: 12    atenolol (TENORMIN) 25 MG tablet, Take 1 tablet by mouth Daily. (Patient taking differently: Take 1 tablet by mouth Daily. As needed), Disp: 90 tablet, Rfl: 3    atorvastatin (LIPITOR) 80 MG tablet, Take 1 tablet by mouth Daily., Disp: 90 tablet, Rfl: 3    Blood Glucose Monitoring Suppl w/Device kit, 1 Device Daily. DX E11.9, Disp: 1 each, Rfl: 0    clopidogrel (PLAVIX) 75 MG tablet, Take 1 tablet by mouth Daily., Disp: , Rfl:     empagliflozin (JARDIANCE) 25 MG tablet tablet, Take 1 tablet by mouth Daily., Disp: 90 tablet, Rfl: 1    glucose blood test strip, Blood sugar twice daily diabetes E11.9, Disp: 100 each, Rfl: 12    Lancets misc, 1 container 2 (Two) Times a Day. DX E11.9, Disp: 100 each, Rfl: 2    metFORMIN (GLUCOPHAGE) 1000 MG tablet, Take 1 tablet by mouth 2 (Two) Times a Day With Meals., Disp: 180 tablet, Rfl: 1    nystatin (MYCOSTATIN) 402945 UNIT/GM cream, Apply 1 application  topically to the appropriate area as directed 2 (Two) Times a Day. (Patient taking differently: Apply 1 Application topically to the appropriate area as directed As Needed.), Disp: 60 g, Rfl: 1    pantoprazole (PROTONIX) 40 MG EC tablet, Take 1 tablet by mouth Daily., Disp: 30 tablet, Rfl: 3    PARoxetine (PAXIL) 40 MG tablet, Take 1 tablet by mouth once daily, Disp: 90 tablet, Rfl: 0    valACYclovir (VALTREX) 500 MG tablet, Take 1 tablet by mouth 2 (Two) Times a Day., Disp: , Rfl:     carboxymethylcellulose (REFRESH PLUS) 0.5 % solution, Administer  to both eyes 3 (Three) Times a Day As Needed for Dry Eyes. (Patient not taking: Reported on 4/25/2025), Disp: , Rfl:     colesevelam (Welchol) 625 MG tablet, Take 2 tablets by mouth 2 (Two) Times a Day With Meals., Disp: 120 tablet, Rfl: 5    Insulin Glargine (LANTUS SOLOSTAR) 100 UNIT/ML injection  "pen, Inject 30 Units under the skin into the appropriate area as directed Every Night. (Patient not taking: Reported on 4/25/2025), Disp: 15 mL, Rfl: 12    isosorbide mononitrate (IMDUR) 30 MG 24 hr tablet, Take 1 tablet by mouth Daily., Disp: , Rfl:     loperamide (IMODIUM A-D) 2 MG tablet, Take 1 tablet by mouth 4 (Four) Times a Day As Needed for Diarrhea., Disp: 60 tablet, Rfl: 1    loratadine (CLARITIN) 10 MG tablet, Take 1 tablet by mouth Daily. (Patient not taking: Reported on 4/25/2025), Disp: 90 tablet, Rfl: 3    montelukast (SINGULAIR) 10 MG tablet, Take 1 tablet by mouth Every Night. (Patient not taking: Reported on 4/25/2025), Disp: 90 tablet, Rfl: 3    Wheat Dextrin (Benefiber) powder, 2 tsp by mouth three times a day, Disp: 730 g, Rfl: 5    Vital Signs:      /78   Pulse 73   Temp 97.8 °F (36.6 °C) (Temporal)   Resp 20   Ht 165.1 cm (65\")   Wt 76.8 kg (169 lb 6.4 oz)   SpO2 98%   BMI 28.19 kg/m²     Vitals:    04/25/25 1555   BP: 121/78   Pulse: 73   Resp: 20   Temp: 97.8 °F (36.6 °C)   TempSrc: Temporal   SpO2: 98%   Weight: 76.8 kg (169 lb 6.4 oz)   Height: 165.1 cm (65\")      Physical Exam  Vitals reviewed.   Constitutional:       General: She is not in acute distress.     Appearance: Normal appearance.      Comments: Using cane   HENT:      Head: Normocephalic and atraumatic.      Right Ear: Tympanic membrane, ear canal and external ear normal.      Left Ear: Tympanic membrane, ear canal and external ear normal.      Mouth/Throat:      Mouth: Mucous membranes are moist.      Pharynx: Oropharynx is clear.   Eyes:      General: No scleral icterus.     Conjunctiva/sclera: Conjunctivae normal.   Cardiovascular:      Rate and Rhythm: Normal rate and regular rhythm.      Heart sounds: Normal heart sounds.   Pulmonary:      Effort: Pulmonary effort is normal. No respiratory distress.      Breath sounds: Normal breath sounds.   Abdominal:      Palpations: Abdomen is soft.      Tenderness: There " is no abdominal tenderness.   Musculoskeletal:      Left shoulder: Normal.      Cervical back: Neck supple.      Right lower leg: No edema.      Left lower leg: No edema.   Lymphadenopathy:      Cervical: No cervical adenopathy.   Skin:     General: Skin is warm and dry.   Neurological:      Mental Status: She is alert and oriented to person, place, and time.   Psychiatric:         Mood and Affect: Mood normal.         Behavior: Behavior normal.        Result Review :   The following data was reviewed by: MAGNUS Ross on 04/25/2025:  Common labs          11/21/2024    10:47 1/17/2025    00:00 3/4/2025    16:05   Common Labs   Hemoglobin A1C 15.0   13.3    Microalbumin, Urine  <3.0       TSH          10/23/2024    07:55   TSH   TSH 1.200         Assessment and Plan    Diagnoses and all orders for this visit:    1. Chronic diarrhea (Primary)  -     colesevelam (Welchol) 625 MG tablet; Take 2 tablets by mouth 2 (Two) Times a Day With Meals.  Dispense: 120 tablet; Refill: 5  -     loperamide (IMODIUM A-D) 2 MG tablet; Take 1 tablet by mouth 4 (Four) Times a Day As Needed for Diarrhea.  Dispense: 60 tablet; Refill: 1  -     Wheat Dextrin (Benefiber) powder; 2 tsp by mouth three times a day  Dispense: 730 g; Refill: 5    2. Diabetic autonomic neuropathy associated with type 2 diabetes mellitus    3. Incontinence of feces, unspecified fecal incontinence type    4. Type 2 diabetes mellitus with hyperglycemia, with long-term current use of insulin  Overview:  Hgba1c 14 % on 8/23/2023  Hgba1c 13.1 % on 12/15/2023  Hgba1c 15 % on 3/15/2024  Hgba1c 12.6 % on 6/28/2024  Hgba1c 15 % on 11/21/2024  Hgba1c 13.3 % 03/04/2025    Assessment & Plan:  Chronic problem.  Uncontrolled.  She is not taking Lantus insulin  Keep appointment with endocrinology as scheduled on 5/1/2025      5. Noncompliance with medication regimen       Begin fiber supplement and Welchol  Begin loperamide as needed  Discussed concern her diarrhea is due  to her uncontrolled diabetes and she needs to work to get her diabetes under control.      This medical care serves as the continuing focal point of all needed health care services.        Follow Up   Return in about 7 months (around 11/25/2025) for Annual physical.  Patient was given instructions and counseling regarding her condition or for health maintenance advice. Please see specific information pulled into the AVS if appropriate.    There are no Patient Instructions on file for this visit.

## 2025-04-26 PROBLEM — E11.43 DIABETIC AUTONOMIC NEUROPATHY ASSOCIATED WITH TYPE 2 DIABETES MELLITUS: Status: ACTIVE | Noted: 2025-04-26

## 2025-04-26 NOTE — ASSESSMENT & PLAN NOTE
Chronic problem.  Uncontrolled.  She is not taking Lantus insulin  Keep appointment with endocrinology as scheduled on 5/1/2025

## 2025-04-28 ENCOUNTER — TELEPHONE (OUTPATIENT)
Dept: FAMILY MEDICINE CLINIC | Facility: CLINIC | Age: 60
End: 2025-04-28
Payer: MEDICARE

## 2025-04-28 NOTE — TELEPHONE ENCOUNTER
Pt's insurance denied Colesevelam    You asked for the drug above for your Noninfective gastroenteritis and colitis, unspecified and chronic diarrhea. This is an off-label use that is not medically accepted. The Medicare rule in the Prescription Drug Benefit Manual (Chapter 6, Section 10.6) says a drug must be used for a medically accepted indication (covered use). Off-label use is medically accepted when there is proof in one or more of the drug guides that the drug works for your condition.

## 2025-05-01 ENCOUNTER — OFFICE VISIT (OUTPATIENT)
Dept: ENDOCRINOLOGY | Facility: CLINIC | Age: 60
End: 2025-05-01
Payer: MEDICARE

## 2025-05-01 VITALS
DIASTOLIC BLOOD PRESSURE: 75 MMHG | BODY MASS INDEX: 28.32 KG/M2 | HEART RATE: 79 BPM | SYSTOLIC BLOOD PRESSURE: 125 MMHG | WEIGHT: 170 LBS | OXYGEN SATURATION: 97 % | HEIGHT: 65 IN

## 2025-05-01 DIAGNOSIS — E11.65 TYPE 2 DIABETES MELLITUS WITH HYPERGLYCEMIA, WITH LONG-TERM CURRENT USE OF INSULIN: Primary | ICD-10-CM

## 2025-05-01 DIAGNOSIS — E11.42 DIABETIC PERIPHERAL NEUROPATHY: ICD-10-CM

## 2025-05-01 DIAGNOSIS — I25.10 CORONARY ARTERY DISEASE INVOLVING NATIVE CORONARY ARTERY OF NATIVE HEART WITHOUT ANGINA PECTORIS: ICD-10-CM

## 2025-05-01 DIAGNOSIS — Z79.4 TYPE 2 DIABETES MELLITUS WITH HYPERGLYCEMIA, WITH LONG-TERM CURRENT USE OF INSULIN: Primary | ICD-10-CM

## 2025-05-01 DIAGNOSIS — E66.3 OVERWEIGHT: ICD-10-CM

## 2025-05-01 RX ORDER — INSULIN LISPRO 100 [IU]/ML
3 INJECTION, SOLUTION INTRAVENOUS; SUBCUTANEOUS
Qty: 15 ML | Refills: 1 | Status: SHIPPED | OUTPATIENT
Start: 2025-05-01

## 2025-05-01 RX ORDER — PEN NEEDLE, DIABETIC 30 GX3/16"
1 NEEDLE, DISPOSABLE MISCELLANEOUS
Qty: 400 EACH | Refills: 5 | Status: SHIPPED | OUTPATIENT
Start: 2025-05-01

## 2025-05-01 RX ORDER — AVOBENZONE, HOMOSALATE, OCTISALATE, OCTOCRYLENE 30; 40; 45; 26 MG/ML; MG/ML; MG/ML; MG/ML
1 CREAM TOPICAL
Qty: 400 EACH | Refills: 5 | Status: SHIPPED | OUTPATIENT
Start: 2025-05-01

## 2025-05-01 NOTE — PATIENT INSTRUCTIONS
# Diabetes Management:    Please start insulin therapy as:    - Insulin Glargine (Slow acting insulin) 15 Units in the evening.  - Insulin lispro / aspart (Fast acting / meal time insulin): 3 Units with each meal.    Meal time insulin need to be taken 15 mins before meal. You can bring your insulin with you if you are planning to eat out.    If you plan to miss the meal please miss the meal time insulin as well.    Also take following non-insulin therapies:  - Jardiance 25 mgs once a day    Check your blood glucose four times a day: before breakfast, before lunch, before dinner and before bedtime. Maintain blood glucose logs.    Low Blood Glucose:    - If you feel sweaty, anxious, shakiness, feel weak, trouble walking, feels like passing out or trouble seeing thing, please check your blood glucose and if its less than 70 or if your feel symptoms of low blood glucose then take one of the following:    *3 or 4 glucose tablets  *½ cup of juice or regular soda (not sugar-free)  *2 tablespoons of raisins  *4 or 5 saltine crackers  *1 tablespoon of sugar  *1 tablespoon of honey or corn syrup  *6 to 8 hard candies     Follow up in 2-3 months time.    Thank you for your visit today.    If you have any questions or concerns please feel free to reach out of the office.

## 2025-05-01 NOTE — PROGRESS NOTES
-----------------------------------------------------------------  ENDOCRINE CLINIC NOTE  -----------------------------------------------------------------        PATIENT NAME: Yana Araya  PATIENT : 1965 AGE: 59 y.o.  MRN NUMBER: [id number]  PRIMARY CARE: Melania Damico APRN    ==========================================================================    CHIEF COMPLAINT: Type 2 diabetes  DATE OF SERVICE: 25    ==========================================================================    HPI / SUBJECTIVE    59 y.o. female is seen in the clinic today for type 2 diabetes mellitus.    -Diagnosis Date:   -Last known A1c: 13.3% in 2025    -Current Therapy / Medications:  Jardiance 25 mgs once a day    -Past tried medications: (Not currently using)  Glargine 30 units - took for 3-4 days not taking after that she had one episode of low BG  Metformin - stopped because she was concerned about side-effects    -Home BG logs / monitoring: None    -Meals / Dietary Habits:  One meals a day, with 2-3 snacks a day    -Last eye exam: 6 months ago at Dr. 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 macular edema,    Difficulty walking     Dyslipidemia     Fibromyalgia     History of inferior wall myocardial infarction     Hyperlipidemia     MI (myocardial infarction)     s/p STEMI 2021    Multinodular thyroid 2024    Neuropathy in diabetes     Orthostatic hypotension     Paroxysmal  atrial flutter     Peripheral vascular disease     Pulmonary emphysema 03/17/2024    Sleep apnea        ==========================================================================    PAST SURGICAL HISTORY    Past Surgical History:   Procedure Laterality Date    CARDIAC SURGERY      CATARACT EXTRACTION Bilateral 10/23/2023    and 10/30/2023    COLONOSCOPY  04/25/2014    polyps; Dr. Welsh    COLONOSCOPY N/A 12/14/2023    Procedure: COLONOSCOPY with cold forcep biopsy x2 areas and hot snare polypectomy x1;  Surgeon: Toribio Welsh MD;  Location: [facility] ENDOSCOPY;  Service: Gastroenterology;  Laterality: N/A;  Post-colon polyp    CORONARY STENT PLACEMENT  06/09/2021    FOOT SURGERY Left 06/28/2022    Left first MTP J arthroplasty without implant/Ochoa arthroplasty; Plantar left great toe ulceration debridement with skin graft application; Dr. Tyree Velasco at [facility]    HYSTERECTOMY  04/23/2002    Dr. Casillas; due to abnormal cervical cells and heavy bleeding    SALIVARY GLAND EXCISION      TONSILLECTOMY         ==========================================================================    FAMILY HISTORY    Family History   Problem Relation Age of Onset    Diabetes Mother     Cancer Mother     Anxiety disorder Mother     Depression Mother     No Known Problems Father     Heart disease Sister     Arthritis Sister     Breast cancer Maternal Aunt     Breast cancer Paternal Aunt     Ovarian cancer Cousin        ==========================================================================    SOCIAL HISTORY    Social History     Socioeconomic History    Marital status:     Number of children: 2    Years of education: 11    Highest education level: 11th grade   Tobacco Use    Smoking status: Every Day     Current packs/day: 1.00     Average packs/day: 1 pack/day for 41.3 years (41.3 ttl pk-yrs)     Types: Cigarettes     Start date: 1/1/1984     Passive exposure: Current    Smokeless tobacco: Never   Vaping  Use    Vaping status: Never Used   Substance and Sexual Activity    Alcohol use: Never    Drug use: Never    Sexual activity: Not Currently     Partners: Male     Birth control/protection: Hysterectomy       ==========================================================================    MEDICATIONS      Current Outpatient Medications:     acetaminophen (TYLENOL) 500 MG tablet, Take 2 tablets by mouth Every 6 (Six) Hours As Needed for Mild Pain., Disp: , Rfl:     albuterol sulfate  (90 Base) MCG/ACT inhaler, INHALE 1 TO 2 PUFFS BY MOUTH EVERY 4 TO 6 HOURS AS NEEDED FOR COUGH AND FOR SHORTNESS OF BREATH, Disp: 9 g, Rfl: 0    apixaban (Eliquis) 5 MG tablet tablet, Take 1 tablet by mouth Every 12 (Twelve) Hours. PLEASE CALL OFFICE, Disp: 60 tablet, Rfl: 12    atenolol (TENORMIN) 25 MG tablet, Take 1 tablet by mouth Daily. (Patient taking differently: Take 1 tablet by mouth Daily. As needed), Disp: 90 tablet, Rfl: 3    atorvastatin (LIPITOR) 80 MG tablet, Take 1 tablet by mouth Daily., Disp: 90 tablet, Rfl: 3    Blood Glucose Monitoring Suppl w/Device kit, 1 Device Daily. DX E11.9, Disp: 1 each, Rfl: 0    carboxymethylcellulose (REFRESH PLUS) 0.5 % solution, Administer  to both eyes 3 (Three) Times a Day As Needed for Dry Eyes., Disp: , Rfl:     clopidogrel (PLAVIX) 75 MG tablet, Take 1 tablet by mouth Daily., Disp: , Rfl:     colesevelam (Welchol) 625 MG tablet, Take 2 tablets by mouth 2 (Two) Times a Day With Meals., Disp: 120 tablet, Rfl: 5    empagliflozin (JARDIANCE) 25 MG tablet tablet, Take 1 tablet by mouth Daily., Disp: 90 tablet, Rfl: 1    Insulin Glargine (LANTUS SOLOSTAR) 100 UNIT/ML injection pen, Inject 30 Units under the skin into the appropriate area as directed Every Night., Disp: 15 mL, Rfl: 12    isosorbide mononitrate (IMDUR) 30 MG 24 hr tablet, Take 1 tablet by mouth Daily., Disp: , Rfl:     loperamide (IMODIUM A-D) 2 MG tablet, Take 1 tablet by mouth 4 (Four) Times a Day As Needed for  Diarrhea., Disp: 60 tablet, Rfl: 1    loratadine (CLARITIN) 10 MG tablet, Take 1 tablet by mouth Daily., Disp: 90 tablet, Rfl: 3    montelukast (SINGULAIR) 10 MG tablet, Take 1 tablet by mouth Every Night., Disp: 90 tablet, Rfl: 3    nystatin (MYCOSTATIN) 442813 UNIT/GM cream, Apply 1 application  topically to the appropriate area as directed 2 (Two) Times a Day. (Patient taking differently: Apply 1 Application topically to the appropriate area as directed As Needed.), Disp: 60 g, Rfl: 1    pantoprazole (PROTONIX) 40 MG EC tablet, Take 1 tablet by mouth Daily., Disp: 30 tablet, Rfl: 3    PARoxetine (PAXIL) 40 MG tablet, Take 1 tablet by mouth once daily, Disp: 90 tablet, Rfl: 0    valACYclovir (VALTREX) 500 MG tablet, Take 1 tablet by mouth 2 (Two) Times a Day., Disp: , Rfl:     Wheat Dextrin (Benefiber) powder, 2 tsp by mouth three times a day, Disp: 730 g, Rfl: 5    ==========================================================================    ALLERGIES    Allergies   Allergen Reactions    Penicillins Unknown - High Severity and Confusion     Other reaction(s): feel funny    Sulfa Antibiotics Myalgia       ==========================================================================    OBJECTIVE    Vitals:    05/01/25 1206   BP: 125/75   Pulse: 79   SpO2: 97%     Body mass index is 28.29 kg/m².     General: Alert, cooperative, no acute distress  Thyroid:  no enlargement/tenderness/palpable nodules  Lungs: Clear to auscultation bilaterally, respirations unlabored  Heart: Regular rate and rhythm, S1 and S2 normal, no murmur, rub or gallop  Abdomen: Soft, NT, ND and Bowel sounds Positive  Extremities:  Extremities normal, atraumatic, no cyanosis or edema    ==========================================================================    LAB EVALUATION    Lab Results   Component Value Date    GLUCOSE 304 (H) 10/23/2024    BUN 12 10/23/2024    CREATININE 0.60 10/23/2024    EGFRIFNONA >150 01/27/2022    EGFRIFAFRI >60  06/14/2021    BCR 20 10/23/2024    K 4.2 10/23/2024    CO2 30 (H) 10/23/2024    CALCIUM 9.1 10/23/2024    ALBUMIN 3.7 (L) 10/23/2024    AST 19 10/23/2024    ALT 31 10/23/2024    TRIG 145 10/23/2024    HDL 53 10/23/2024    LDL 59 10/23/2024     Lab Results   Component Value Date    HGBA1C 13.3 (A) 03/04/2025    HGBA1C 15.0 (A) 11/21/2024    HGBA1C 12.6 (A) 06/28/2024     Lab Results   Component Value Date    MICROALBUR <3.0 01/17/2025    CREATININE 0.60 10/23/2024     Lab Results   Component Value Date    TSH 1.200 10/23/2024       ==========================================================================    ASSESSMENT AND PLAN    # Type 2 diabetes with hyperglycemia  # Diabetic peripheral neuropathy  # Overweight with BMI of 28.29  # CAD s/p PCI    - Discussed with patient in detail about pathophysiology of type 2 diabetes  - Counseled patient about importance of checking blood sugar and offered patient CGM versus fingersticks patient wants to do fingersticks  - Patient to start checking blood sugar ACHS with fingersticks  - Regarding meals counseled patient about having 3 meals a day and avoid any snacking  - Patient have A1c persistently above 9% she will benefit from introducing insulin therapy  - Patient had previously taking Tresiba 30 units leading to 1 episode of hypoglycemia  - Given patient concern of hypoglycemia we will start patient more conservatively  - Continue Jardiance therapy given underlying history of coronary artery disease  - New adjusted regimen:  Jardiance 25 mg p.o. daily  Insulin Glargine 15 units nightly  Insulin lispro 3 units with each meal  - Patient to follow-up clinic back again in 2 to 3 months time with blood sugar readings  Thank you for courtesy of consultation.    Return to clinic: 2-3 months    Entire assessment and plan was discussed and counseled the patient in detail to which patient verbalized understanding and agreed with care.  Answered all queries and concerns.    Part of  this note may be an electronic transcription/translation of spoken language to printed text using the Dragon Dictation System.     Note: Portions of this note may have been copied from previous notes but documentation have been reviewed and edited as necessary to support clinical decision making for today's visit.    ==========================================================================    INFORMATION PROVIDED TO PATIENT    Patient Instructions   # Diabetes Management:    Please start insulin therapy as:    - Insulin Glargine (Slow acting insulin) 15 Units in the evening.  - Insulin lispro / aspart (Fast acting / meal time insulin): 3 Units with each meal.    Meal time insulin need to be taken 15 mins before meal. You can bring your insulin with you if you are planning to eat out.    If you plan to miss the meal please miss the meal time insulin as well.    Also take following non-insulin therapies:  - Jardiance 25 mgs once a day    Check your blood glucose four times a day: before breakfast, before lunch, before dinner and before bedtime. Maintain blood glucose logs.    Low Blood Glucose:    - If you feel sweaty, anxious, shakiness, feel weak, trouble walking, feels like passing out or trouble seeing thing, please check your blood glucose and if its less than 70 or if your feel symptoms of low blood glucose then take one of the following:    *3 or 4 glucose tablets  *½ cup of juice or regular soda (not sugar-free)  *2 tablespoons of raisins  *4 or 5 saltine crackers  *1 tablespoon of sugar  *1 tablespoon of honey or corn syrup  *6 to 8 hard candies     Follow up in 2-3 months time.    Thank you for your visit today.    If you have any questions or concerns please feel free to reach out of the office.       ==========================================================================  Fabian Wolfe MD  Department of Endocrine, Diabetes and Metabolism  [facility],  [place]  ==========================================================================

## 2025-05-09 ENCOUNTER — EXTERNAL PBMM DATA (OUTPATIENT)
Dept: PHARMACY | Facility: OTHER | Age: 60
End: 2025-05-09
Payer: MEDICARE

## 2025-05-12 DIAGNOSIS — R05.9 COUGH, UNSPECIFIED TYPE: ICD-10-CM

## 2025-05-13 RX ORDER — ALBUTEROL SULFATE 90 UG/1
INHALANT RESPIRATORY (INHALATION)
Qty: 9 G | Refills: 3 | Status: SHIPPED | OUTPATIENT
Start: 2025-05-13

## 2025-05-16 DIAGNOSIS — F32.A DEPRESSION, UNSPECIFIED DEPRESSION TYPE: ICD-10-CM

## 2025-05-16 RX ORDER — PAROXETINE 40 MG/1
40 TABLET, FILM COATED ORAL DAILY
Qty: 90 TABLET | Refills: 0 | Status: SHIPPED | OUTPATIENT
Start: 2025-05-16

## 2025-05-27 NOTE — PROGRESS NOTES
"Chief Complaint  NEW PATIENT (MEMORY/)    Subjective            Yana Araya presents to [facility] for MEMORY  History of Present Illness  New patient referred by Melania AGUDELO for memory,  ... a past medical history of Anxiety, CAD (coronary artery disease), Depression, Diabetes, Diabetic retinopathy (08/22/2023), Difficulty walking, Dyslipidemia, Fibromyalgia, History of inferior wall myocardial infarction, Hyperlipidemia, MI (myocardial infarction), Multinodular thyroid (02/01/2024), Neuropathy in diabetes, Orthostatic hypotension, Paroxysmal atrial flutter, Peripheral vascular disease, Pulmonary emphysema (3/17/2024), and Sleep apnea.  ...     patient states her memory problems started about 2 yrs ago and progressively getting worse,   she states she has good and bad days, example: she forgets what she is talking about mid conversations or she doesn't recall what she done a few days before,  patient states she has not noticed if anything make her memory worse   patient has a family h/o dementia/alzheimer's (grandmothers).   She is currently not on any medications for memory    Repeats self  Has not gotten lost, and no car accidents.  Lives alone, has one son 15 min away    Pt buys groceries,   Have not forgotten to take medications, has a pill box to help    Pt had sleep apnea was up to 336 lbs, now 168 , no longer uses CPAP  Had difficulty using CPAP, took off during sleep   Pt is tired all the time, lives alone, so not sure about snoring.           .      Maximum   Score Patient's   Score Questions   5 4 \"What is the year?Season?Date?Day of the week?Month?\"   5 5 \"Where are we now: State?County?Town/city?Hospital?Floor?\"   3 3 3 Unrelated objects Number of trials:___   5 5 Count backward from 100 by sevens or spell WORLD backwards   3 1 Name 3 things from above   2 2 Identify 2 objects   1 1 Repeat the phrase: No ifs, ands,or buts.   3 3 Take paper in right hand, fold it " "in half, and put it on the floor.   1 1  Please read this and do what it says. \"Close your eyes\"   1 1 Make up and write a sentence about anything. Noun and verb   1 1 Copy this picture 10 angles must be present.   30 27 Total MMSE     ======MRI BRAIN WO CONTRAST 1/15/25======  Impression:  1.No acute intracranial abnormality.  2.Bilateral periventricular T2/FLAIR white matter hyperintensities most commonly representing chronic microvascular ischemic changes. Cannot exclude other demyelinating condition.       Family History   Problem Relation Age of Onset    Diabetes Mother     Cancer Mother     Anxiety disorder Mother     Depression Mother     Dementia Father     Heart disease Sister     Arthritis Sister     Dementia Maternal Aunt     Breast cancer Maternal Aunt     Dementia Maternal Uncle     Breast cancer Paternal Aunt     Ovarian cancer Cousin        Past Medical History:   Diagnosis Date    Anxiety     Arthritis     CAD (coronary artery disease)     Cataract     Colon polyp     Depression     Diabetes     Diabetic retinopathy 08/22/2023    Type 2 diabetes mellitus with right eye affected by mild nonproliferative retinopathy without macular edema,    Difficulty walking     Dyslipidemia     Fibromyalgia     History of inferior wall myocardial infarction     Hyperlipidemia     MI (myocardial infarction)     s/p STEMI 6/2021    Multinodular thyroid 02/01/2024    Neuropathy in diabetes     Orthostatic hypotension     Paroxysmal atrial flutter     Peripheral vascular disease     Pulmonary emphysema 03/17/2024    Sleep apnea        Social History     Socioeconomic History    Marital status:     Number of children: 2    Years of education: 11    Highest education level: 11th grade   Tobacco Use    Smoking status: Every Day     Current packs/day: 1.00     Average packs/day: 1 pack/day for 41.4 years (41.4 ttl pk-yrs)     Types: Cigarettes     Start date: 1/1/1984     Passive exposure: Current    Smokeless " tobacco: Never   Vaping Use    Vaping status: Never Used   Substance and Sexual Activity    Alcohol use: Never    Drug use: Never    Sexual activity: Not Currently     Partners: Male     Birth control/protection: Hysterectomy         Current Outpatient Medications:     acetaminophen (TYLENOL) 500 MG tablet, Take 2 tablets by mouth Every 6 (Six) Hours As Needed for Mild Pain., Disp: , Rfl:     albuterol sulfate  (90 Base) MCG/ACT inhaler, INHALE 1 TO 2 PUFFS BY MOUTH EVERY 4 TO 6 HOURS AS NEEDED FOR COUGH AND FOR SHORTNESS OF BREATH, Disp: 9 g, Rfl: 3    apixaban (Eliquis) 5 MG tablet tablet, Take 1 tablet by mouth Every 12 (Twelve) Hours. PLEASE CALL OFFICE, Disp: 60 tablet, Rfl: 12    atenolol (TENORMIN) 25 MG tablet, Take 1 tablet by mouth Daily. (Patient taking differently: Take 1 tablet by mouth Daily. As needed), Disp: 90 tablet, Rfl: 3    atorvastatin (LIPITOR) 80 MG tablet, Take 1 tablet by mouth Daily., Disp: 90 tablet, Rfl: 3    Blood Glucose Monitoring Suppl w/Device kit, 1 Device Daily. DX E11.9, Disp: 1 each, Rfl: 0    carboxymethylcellulose (REFRESH PLUS) 0.5 % solution, Administer  to both eyes 3 (Three) Times a Day As Needed for Dry Eyes., Disp: , Rfl:     clopidogrel (PLAVIX) 75 MG tablet, Take 1 tablet by mouth Daily., Disp: , Rfl:     colesevelam (Welchol) 625 MG tablet, Take 2 tablets by mouth 2 (Two) Times a Day With Meals., Disp: 120 tablet, Rfl: 5    empagliflozin (JARDIANCE) 25 MG tablet tablet, Take 1 tablet by mouth Daily., Disp: 90 tablet, Rfl: 1    glucose blood test strip, 1 each by Other route 4 (Four) Times a Day Before Meals & at Bedtime. Accu-Chek, Disp: 400 each, Rfl: 5    Insulin Glargine (LANTUS SOLOSTAR) 100 UNIT/ML injection pen, Inject 15 Units under the skin into the appropriate area as directed Every Night., Disp: 15 mL, Rfl: 12    Insulin Lispro, 1 Unit Dial, (HumaLOG KwikPen) 100 UNIT/ML solution pen-injector, Inject 3 Units under the skin into the appropriate area as  "directed 3 (Three) Times a Day With Meals., Disp: 15 mL, Rfl: 1    Insulin Pen Needle (Pen Needles) 32G X 4 MM misc, Use 1 each 4 (Four) Times a Day Before Meals & at Bedtime., Disp: 400 each, Rfl: 5    isosorbide mononitrate (IMDUR) 30 MG 24 hr tablet, Take 1 tablet by mouth Daily., Disp: , Rfl:     Lancets misc, Use 1 each 4 (Four) Times a Day Before Meals & at Bedtime. Accu-Chek, Disp: 400 each, Rfl: 5    loperamide (IMODIUM A-D) 2 MG tablet, Take 1 tablet by mouth 4 (Four) Times a Day As Needed for Diarrhea., Disp: 60 tablet, Rfl: 1    loratadine (CLARITIN) 10 MG tablet, Take 1 tablet by mouth Daily., Disp: 90 tablet, Rfl: 3    montelukast (SINGULAIR) 10 MG tablet, Take 1 tablet by mouth Every Night., Disp: 90 tablet, Rfl: 3    nystatin (MYCOSTATIN) 994200 UNIT/GM cream, Apply 1 application  topically to the appropriate area as directed 2 (Two) Times a Day. (Patient taking differently: Apply 1 Application topically to the appropriate area as directed As Needed.), Disp: 60 g, Rfl: 1    pantoprazole (PROTONIX) 40 MG EC tablet, Take 1 tablet by mouth Daily., Disp: 30 tablet, Rfl: 3    PARoxetine (PAXIL) 40 MG tablet, Take 1 tablet by mouth once daily, Disp: 90 tablet, Rfl: 0    valACYclovir (VALTREX) 500 MG tablet, Take 1 tablet by mouth 2 (Two) Times a Day., Disp: , Rfl:     Wheat Dextrin (Benefiber) powder, 2 tsp by mouth three times a day, Disp: 730 g, Rfl: 5    Review of Systems   Constitutional:  Positive for fatigue.   Eyes:  Positive for pain and itching.   Endocrine: Positive for cold intolerance.   Musculoskeletal:  Positive for back pain, joint swelling and neck pain.   Allergic/Immunologic: Positive for environmental allergies.   Neurological:  Positive for dizziness.   Psychiatric/Behavioral:  Positive for sleep disturbance. The patient is nervous/anxious.    All other systems reviewed and are negative.           Objective   Vital Signs:   BP (!) 88/63   Pulse 79   Ht 165.1 cm (65\")   Wt 76.2 kg (168 " lb)   BMI 27.96 kg/m²     Physical Exam  Vitals reviewed.   Constitutional:       Appearance: Normal appearance.   HENT:      Nose: Nose normal.   Eyes:      General: Lids are normal.      Extraocular Movements: Extraocular movements intact.      Pupils: Pupils are equal, round, and reactive to light.   Cardiovascular:      Rate and Rhythm: Normal rate.   Pulmonary:      Effort: Pulmonary effort is normal.   Musculoskeletal:         General: Normal range of motion.   Neurological:      Mental Status: She is oriented to person, place, and time.      Motor: Motor strength is normal.     Coordination: Romberg sign positive.      Deep Tendon Reflexes:      Reflex Scores:       Bicep reflexes are 2+ on the right side and 2+ on the left side.       Patellar reflexes are 2+ on the right side and 2+ on the left side.  Psychiatric:         Mood and Affect: Mood normal.         Speech: Speech normal.        Result Review :                Neurological Exam  Mental Status  Awake, alert and oriented to person, place and time. Oriented to person, place and time. Oriented to person, place, and time. Recalls 1 of 3 objects immediately. Speech is normal. Attention and concentration are normal.    Cranial Nerves  CN II: Visual acuity is normal. Visual fields full to confrontation.  CN III, IV, VI: Extraocular movements intact bilaterally. Normal lids and orbits bilaterally. Pupils equal round and reactive to light bilaterally.  CN V: Facial sensation is normal.  CN VII: Full and symmetric facial movement.  CN IX, X: Palate elevates symmetrically. Normal gag reflex.  CN XI: Shoulder shrug strength is normal.  CN XII: Tongue midline without atrophy or fasciculations.    Motor  Normal muscle bulk throughout. Normal muscle tone. Strength is 5/5 throughout all four extremities.    Sensory  Decreased sensation to pain, vibration in feet .    Reflexes                                            Right                      Left  Biceps                                  2+                         2+  Patellar                                2+                         2+  Achilles                                Tr                         Tr    Coordination  Right: Finger-to-nose normal.Left: Finger-to-nose normal.    Gait   Romberg is present.  Wide based gait.             Assessment and Plan    Diagnoses and all orders for this visit:    1. Memory loss (Primary)  -     Calcitriol (1,25 di-OH Vitamin D); Future  -     Vitamin B6; Future  -     Vitamin E; Future  -     Copper, Serum; Future  -     Treponema pallidum AB w/Reflex RPR; Future    2. Obstructive sleep apnea  -     Home Sleep Study; Future    3. Diabetic peripheral neuropathy      This patient has multiple risk factors for developing dementia.  Risk factors include family history of dementia in her mother's sister and grandmothers both maternal and paternal.  She has poorly controlled diabetes which is a major risk factor for the development of Alzheimer's.  She reports she had treatment for obstructive sleep apnea with CPAP but gave up several years ago using CPAP.  She has lost significant weight she continues to have daily chronic fatigue but does not know about snoring as she lives alone.  Other risk factors include history of hypertension diabetes and smoking which leads to vascular disease.  Her MRI scan of the brain was personally reviewed and has white matter changes quite consistent with microvascular disease.  The distribution of the white matter changes in her clinical history does not suggest demyelinating disease.    She was encouraged to work on controlling her blood sugar.  Given the history of sleep apnea and the continued history of fatigue a home sleep test is ordered and we will initiate treatment with CPAP if indicated.  Treating sleep apnea can help lower the A1c without changes in diet or medications.  She was strongly encouraged to discontinue smoking.  Given all her health  conditions she was encouraged to obtain a first alert or similar device.  She is at risk for falls given her peripheral neuropathy and poor balance.  Her B12 folate and thyroid test have been normal in the past.  I have ordered some additional labs for possible contributing factors to memory impairment.    Follow Up   Return for Follow Up visit 30 to 90 days after obtaining PAP.  Patient was given instructions and counseling regarding her condition or for health maintenance advice. Please see specific information pulled into the AVS if appropriate.         This document has been electronically signed by Joseph Seipel, MD on May 29, 2025 10:29 EDT

## 2025-05-29 ENCOUNTER — OFFICE VISIT (OUTPATIENT)
Dept: NEUROLOGY | Facility: CLINIC | Age: 60
End: 2025-05-29
Payer: MEDICARE

## 2025-05-29 VITALS
HEIGHT: 65 IN | DIASTOLIC BLOOD PRESSURE: 63 MMHG | HEART RATE: 79 BPM | BODY MASS INDEX: 27.99 KG/M2 | WEIGHT: 168 LBS | SYSTOLIC BLOOD PRESSURE: 88 MMHG

## 2025-05-29 DIAGNOSIS — E11.42 DIABETIC PERIPHERAL NEUROPATHY: ICD-10-CM

## 2025-05-29 DIAGNOSIS — G47.33 OBSTRUCTIVE SLEEP APNEA: ICD-10-CM

## 2025-05-29 DIAGNOSIS — R41.3 MEMORY LOSS: Primary | ICD-10-CM

## 2025-05-29 PROCEDURE — 1160F RVW MEDS BY RX/DR IN RCRD: CPT | Performed by: PSYCHIATRY & NEUROLOGY

## 2025-05-29 PROCEDURE — 3074F SYST BP LT 130 MM HG: CPT | Performed by: PSYCHIATRY & NEUROLOGY

## 2025-05-29 PROCEDURE — 1159F MED LIST DOCD IN RCRD: CPT | Performed by: PSYCHIATRY & NEUROLOGY

## 2025-05-29 PROCEDURE — 3078F DIAST BP <80 MM HG: CPT | Performed by: PSYCHIATRY & NEUROLOGY

## 2025-05-29 PROCEDURE — 99204 OFFICE O/P NEW MOD 45 MIN: CPT | Performed by: PSYCHIATRY & NEUROLOGY

## 2025-06-06 DIAGNOSIS — E78.5 DYSLIPIDEMIA: ICD-10-CM

## 2025-06-07 RX ORDER — ATORVASTATIN CALCIUM 80 MG/1
80 TABLET, FILM COATED ORAL DAILY
Qty: 90 TABLET | Refills: 0 | Status: SHIPPED | OUTPATIENT
Start: 2025-06-07

## 2025-06-09 ENCOUNTER — LAB (OUTPATIENT)
Dept: LAB | Facility: HOSPITAL | Age: 60
End: 2025-06-09
Payer: MEDICARE

## 2025-06-09 ENCOUNTER — HOSPITAL ENCOUNTER (OUTPATIENT)
Dept: SLEEP MEDICINE | Facility: HOSPITAL | Age: 60
Discharge: HOME | End: 2025-06-09
Admitting: PSYCHIATRY & NEUROLOGY
Payer: MEDICARE

## 2025-06-09 DIAGNOSIS — G47.33 OBSTRUCTIVE SLEEP APNEA: ICD-10-CM

## 2025-06-09 DIAGNOSIS — R41.3 MEMORY LOSS: ICD-10-CM

## 2025-06-09 LAB — TREPONEMA PALLIDUM IGG+IGM AB [PRESENCE] IN SERUM OR PLASMA BY IMMUNOASSAY: NORMAL

## 2025-06-09 PROCEDURE — 86780 TREPONEMA PALLIDUM: CPT

## 2025-06-09 PROCEDURE — 82525 ASSAY OF COPPER: CPT

## 2025-06-09 PROCEDURE — 82652 VIT D 1 25-DIHYDROXY: CPT

## 2025-06-09 PROCEDURE — 84207 ASSAY OF VITAMIN B-6: CPT

## 2025-06-09 PROCEDURE — 36415 COLL VENOUS BLD VENIPUNCTURE: CPT

## 2025-06-09 PROCEDURE — G0399 HOME SLEEP TEST/TYPE 3 PORTA: HCPCS

## 2025-06-09 PROCEDURE — 84446 ASSAY OF VITAMIN E: CPT

## 2025-06-12 LAB
1,25(OH)2D SERPL-MCNC: 29.2 PG/ML (ref 24.8–81.5)
COPPER SERPL-MCNC: 28 UG/DL (ref 80–158)

## 2025-06-15 LAB
A-TOCOPHEROL VIT E SERPL-MCNC: 8.4 MG/L (ref 7–25.1)
GAMMA TOCOPHEROL SERPL-MCNC: 1.4 MG/L (ref 0.5–5.5)

## 2025-06-16 ENCOUNTER — TREATMENT (OUTPATIENT)
Dept: PHYSICAL THERAPY | Facility: CLINIC | Age: 60
End: 2025-06-16
Payer: MEDICARE

## 2025-06-16 DIAGNOSIS — M54.2 PAIN, NECK: Primary | ICD-10-CM

## 2025-06-16 DIAGNOSIS — M43.6 NECK STIFFNESS: ICD-10-CM

## 2025-06-16 PROCEDURE — 97161 PT EVAL LOW COMPLEX 20 MIN: CPT

## 2025-06-16 PROCEDURE — 97012 MECHANICAL TRACTION THERAPY: CPT

## 2025-06-16 PROCEDURE — 97110 THERAPEUTIC EXERCISES: CPT

## 2025-06-16 NOTE — PROGRESS NOTES
Physical Therapy Initial Evaluation and Plan of Care                          [address] [place] [address] [address] [place] [address]    Patient: Yana Araya   : 1965  Diagnosis/ICD-10 Code:  Pain, neck [M54.2]  Referring practitioner: Zaida Ricks PA-C  Date of Initial Visit: 2025  Today's Date: 2025  Patient seen for 1 sessions           Subjective Questionnaire: NDI:42% limitation      Subjective Evaluation    History of Present Illness  Mechanism of injury: Pt reports pain for several years in both upper traps. She then started having neck pain about 5 years ago. She tried therapy a while back with no relief.   Pain is equal between right and left sides. Pain also radiates down both arms to just above elbow. Pain gets some paresthesia in left UE. Pt also has neuropathy with paresthesia in both hands from diabetes. Pain increases with activity with neck flexion. Also increased pain with lifting.   Some relief with taking Tylenol. Pt lays back on couch in order to get some relief.      Pain  Current pain ratin  At worst pain ratin  Quality: dull ache and throbbing  Relieving factors: change in position    Patient Goals  Patient goals for therapy: decreased pain, independence with ADLs/IADLs and increased motion             Objective          Postural Observations    Additional Postural Observation Details  Pt with moderate forward head and shoulder posture.     Palpation   Left   Hypertonic in the cervical paraspinals, middle trapezius and upper trapezius.   Tenderness of the cervical paraspinals, middle trapezius and upper trapezius.     Right   Hypertonic in the cervical paraspinals. Tenderness of the cervical paraspinals.     Active Range of Motion   Cervical/Thoracic Spine   Cervical    Flexion: 45 degrees with pain  Extension: 21 degrees   Left lateral flexion: 18 degrees   Right lateral flexion: 20 degrees   Left rotation: 68 degrees with pain  Right rotation: 62 degrees      Tests     Additional Tests Details  Pt with relief of symptoms with manual cervical distraction       See Exercise, Manual, and Modality Logs for complete treatment.     Assessment & Plan       Assessment  Impairments: abnormal muscle firing, abnormal muscle tone, abnormal or restricted ROM, activity intolerance, impaired physical strength, lacks appropriate home exercise program, pain with function and safety issue   Functional limitations: carrying objects, lifting, pulling, pushing, uncomfortable because of pain and unable to perform repetitive tasks   Assessment details: Pt presents with chronic neck and radiating upper trap and upper arm pain. Pain in neck has worsened in last several months. MRI shows moderate central canal stenosis and foraminal stenosis. Pt also supposed to seek help with pain management for possible epidural but she has not done that yet. Pt has some limited cervical ROM and very limited tolerance to prolonged activity involving neck flexion.   Pt will benefit from skilled PT to address these issues.   Prognosis: good    Goals  Plan Goals: Short Term Goals: 4 weeks  1.    Pt will be independent with all exercises assigned to HEP.   2.   The patient will report 6/10 pain.    Long Term Goals: 12 weeks  1.    The patient will report 4/10 pain in order to more easily tolerate activities of daily living and improve sleep quality.    2.   The patient will demonstrate 75° of  cervical spine rotation,  50° of cervical flexion, and 35° of cervical extension in order to adequately monitor blind spots while driving and improve ability to perform activities of daily living.  3.   The patient will report a decrease in upper extremity radicular symptoms  by 75%.  4.   The patient will demonstrate 25% limitation as scored on the Neck Disability Index.        Plan  Therapy options: will be seen for skilled therapy services  Planned modality interventions: cryotherapy, electrical stimulation/Tajik  stimulation, TENS, traction, ultrasound and dry needling  Planned therapy interventions: ADL retraining, soft tissue mobilization, strengthening, stretching, therapeutic activities, joint mobilization, home exercise program, functional ROM exercises, flexibility, body mechanics training, postural training, neuromuscular re-education, manual therapy, motor coordination training, spinal/joint mobilization and IADL retraining  Frequency: 2x week  Duration in weeks: 12  Treatment plan discussed with: patient        Visit Diagnoses:    ICD-10-CM ICD-9-CM   1. Pain, neck  M54.2 723.1   2. Neck stiffness  M43.6 723.5       History # of Personal Factors and/or Comorbidities: LOW (0)  Examination of Body System(s): # of elements: LOW (1-2)  Clinical Presentation: STABLE   Clinical Decision Making: LOW       Timed:           Therapeutic Exercise:    10     mins  49191;     Neuromuscular Zuleyma:    0    mins  21652;  Manual Therapy:    0     mins  07204;    Therapeutic Activity:     0     mins  34646;     Gait Trainin     mins  91791;     Ultrasound:     0     mins  90759;    Ionto                               0    mins   82047  Self Care                       0     mins   33113        Un-Timed:  Electrical Stimulation:    0     mins  02284 ( );  Dry Needling     0     mins self-pay  Traction     15     mins 94644    Low Eval     25     Mins  39078  Mod Eval     0     Mins  43779  High Eval                       0     Mins  18757  Re-Eval                           0    mins  15938    Timed Treatment:   10   mins   Total Treatment:     50   mins    PT SIGNATURE: Hussein Mckeon PT    Electronically signed 2025    [place] License: PT - [id number]  [place] License: PT - [id number]      Medicare Initial Certification  Certification Period: 2025 thru 2025  I certify that the therapy services are furnished while this patient is under my care.  The services outlined above are required by this patient, and will be reviewed  every 90 days.     PHYSICIAN: Zaida Ricks PA-C  NPI: [id number]      DATE:     Please sign and return via fax to [phone]. Thank you, [facility].

## 2025-06-17 LAB — PYRIDOXAL PHOS SERPL-MCNC: 4.3 UG/L (ref 3.4–65.2)

## 2025-06-23 ENCOUNTER — TREATMENT (OUTPATIENT)
Dept: PHYSICAL THERAPY | Facility: CLINIC | Age: 60
End: 2025-06-23
Payer: MEDICARE

## 2025-06-23 DIAGNOSIS — M43.6 NECK STIFFNESS: Primary | ICD-10-CM

## 2025-06-23 DIAGNOSIS — M54.2 PAIN, NECK: ICD-10-CM

## 2025-06-23 PROCEDURE — 97035 APP MDLTY 1+ULTRASOUND EA 15: CPT

## 2025-06-23 PROCEDURE — 97110 THERAPEUTIC EXERCISES: CPT

## 2025-06-23 PROCEDURE — 97012 MECHANICAL TRACTION THERAPY: CPT

## 2025-06-23 NOTE — PROGRESS NOTES
Physical Therapy Daily Treatment Note                        [address] [place] [address]   [address] [place] [address]    Patient: Yana Araya   : 1965  Diagnosis/ICD-10 Code:  Neck stiffness [M43.6]  Referring practitioner: Zaida Ricks PA-C  Date of Initial Visit: Type: THERAPY  Noted: 2025  Today's Date: 2025  Patient seen for 2 sessions           Subjective     Pt reports her neck is hurting in different places than it has been. Pt asking again what is causing  her pain. I reiterated the MRI findings as I did last week.     Objective   See Exercise, Manual, and Modality Logs for complete treatment.   Adding US to lower c-spine bilaterally.     Assessment/Plan     Pt performed all therapeutic exercises with good technique. Pt continues to have limitations with strength and flexibility. Pt will benefit from continued PT to address these limitations.       Timed:  Therapeutic Exercise:    14     mins  54892;     Neuromuscular Zuleyma:   0    mins  89525;  Manual Therapy:    0     mins  39716;    Gait Trainin     mins  55627;     Ultrasound:                    10     mins  27886  Therapeutic Activity:     0     mins  90197;  Self Care                       0     mins   87835    Un-timed:  Electrical Stimulation:    0     mins  75902 ( );  Mechanical Traction      15     mins  94620  Dry Needling     0     mins self-pay  Re-Eval                          0     mins  53287         Timed Treatment:   24   mins   Total Treatment:     39   mins    Hussein Mckeon PT  Physical Therapist    Electronically signed 2025    [place] License:  PT - [id number]  [place] License: PT - [id number]

## 2025-06-25 ENCOUNTER — TREATMENT (OUTPATIENT)
Dept: PHYSICAL THERAPY | Facility: CLINIC | Age: 60
End: 2025-06-25
Payer: MEDICARE

## 2025-06-25 DIAGNOSIS — M43.6 NECK STIFFNESS: Primary | ICD-10-CM

## 2025-06-25 DIAGNOSIS — M54.2 PAIN, NECK: ICD-10-CM

## 2025-06-25 NOTE — PROGRESS NOTES
Physical Therapy Daily Treatment Note      Patient: Yana Araya   : 1965  Diagnosis/ICD-10 Code:  Neck stiffness [M43.6]  Referring practitioner: Zaida Ricks PA-C  Date of Initial Visit: Type: THERAPY  Noted: 2025  Today's Date: 2025  Patient seen for 3 sessions         Yana Araya reports: she responded very well to ultrasound at the last visit and she felt it really held out for her and gave good relief.    Objective   See Exercise, Manual, and Modality Logs for complete treatment.     Assessment/Plan  Pt. Tolerates treatment well this visit and continues to benefit from increasing mobility exercises and is gaining better movement for driving and will continue to benefit from this progression.    Goals  Plan Goals: Short Term Goals: 4 weeks  1.    Pt will be independent with all exercises assigned to HEP.   2.   The patient will report 6/10 pain.     Long Term Goals: 12 weeks  1.    The patient will report 4/10 pain in order to more easily tolerate activities of daily living and improve sleep quality.    2.   The patient will demonstrate 75° of  cervical spine rotation,  50° of cervical flexion, and 35° of cervical extension in order to adequately monitor blind spots while driving and improve ability to perform activities of daily living.  3.   The patient will report a decrease in upper extremity radicular symptoms  by 75%.  4.   The patient will demonstrate 25% limitation as scored on the Neck Disability Index.    Progress strengthening /stabilization /functional activity         Timed:           Therapeutic Exercise:    15     mins  16236;     Ultrasound:     10     mins  74580;      Un-Timed:  Traction     15     mins 73322;      Timed Treatment:   25   mins   Total Treatment:     40   mins    Cindy Meyers PTA  Physical Therapist Assistant License #[id number]

## 2025-06-30 ENCOUNTER — TREATMENT (OUTPATIENT)
Dept: PHYSICAL THERAPY | Facility: CLINIC | Age: 60
End: 2025-06-30
Payer: MEDICARE

## 2025-06-30 DIAGNOSIS — M43.6 NECK STIFFNESS: ICD-10-CM

## 2025-06-30 DIAGNOSIS — M54.2 PAIN, NECK: Primary | ICD-10-CM

## 2025-06-30 NOTE — PROGRESS NOTES
Physical Therapy Daily Treatment Note                        [address] [place] [address] .  [address] [place] [address]    Patient: Yana Araya   : 1965  Diagnosis/ICD-10 Code:  Pain, neck [M54.2]  Referring practitioner: Zaida Ricks PA-C  Date of Initial Visit: Type: THERAPY  Noted: 2025  Today's Date: 2025  Patient seen for 4 sessions           Subjective   Pt has been hurting more the past couple of days for no reason.     Objective   See Exercise, Manual, and Modality Logs for complete treatment.     Assessment/Plan     Pt performed all therapeutic exercises with good technique. Pt continues to have limitations with strength and flexibility. Pt will benefit from continued PT to address these limitations.       Timed:  Therapeutic Exercise:    15     mins  27939;     Neuromuscular Zuleyma:   0    mins  54071;  Manual Therapy:    0     mins  72796;    Gait Trainin     mins  95632;     Ultrasound:                    10     mins  64154  Therapeutic Activity:     0     mins  90072;  Self Care                       0     mins   00156    Un-timed:  Electrical Stimulation:    0     mins  76446 ( );  Mechanical Traction      15     mins  87850  Dry Needling     0     mins self-pay  Re-Eval                          0     mins  26744         Timed Treatment:   25   mins   Total Treatment:     40   mins    Hussein Mckeon PT  Physical Therapist    Electronically signed 2025    [place] License:  PT - [id number]  [place] License: PT - [id number]

## 2025-07-01 ENCOUNTER — TELEPHONE (OUTPATIENT)
Dept: NEUROLOGY | Facility: CLINIC | Age: 60
End: 2025-07-01
Payer: MEDICARE

## 2025-07-01 DIAGNOSIS — G47.33 OBSTRUCTIVE SLEEP APNEA: Primary | ICD-10-CM

## 2025-07-01 NOTE — TELEPHONE ENCOUNTER
----- Message from Joseph Seipel sent at 6/10/2025  5:47 PM EDT -----  Regarding: Sleep  Set up on auto CPAP 5-15

## 2025-07-02 ENCOUNTER — TREATMENT (OUTPATIENT)
Dept: PHYSICAL THERAPY | Facility: CLINIC | Age: 60
End: 2025-07-02
Payer: MEDICARE

## 2025-07-02 DIAGNOSIS — M43.6 NECK STIFFNESS: ICD-10-CM

## 2025-07-02 DIAGNOSIS — M54.2 PAIN, NECK: Primary | ICD-10-CM

## 2025-07-02 NOTE — PROGRESS NOTES
Physical Therapy Daily Treatment Note      Patient: Yana Araya   : 1965  Diagnosis/ICD-10 Code:  Pain, neck [M54.2]  Referring practitioner: Zaida Ricks PA-C  Date of Initial Visit: Type: THERAPY  Noted: 2025  Today's Date: 2025  Patient seen for 5 sessions         Yana Araya reports: her neck and shoulder have been doing a bit better but she struggled a bit Monday due to tightness and soreness she felt maybe the weather was effecting her.    Objective   See Exercise, Manual, and Modality Logs for complete treatment.     Manual Blood Pressure 86/60 mmHg  Heart Rate 82 BPM  SpO2 96%      Assessment/Plan  Pt. performed seated exercise and stretches well and when attempt was made to add mid rows in standing Pt. became dizzy and asked for a seat no sweating or Palor was noted only reported symptoms. Pt. Was tested digitally and manually with BP being very low this date. Pt. Was encouraged to reach out to either PCP or Heart doctor regarding today's readings and symptoms.    Goals  Plan Goals: Short Term Goals: 4 weeks  1.    Pt will be independent with all exercises assigned to HEP.   2.   The patient will report 6/10 pain.     Long Term Goals: 12 weeks  1.    The patient will report 4/10 pain in order to more easily tolerate activities of daily living and improve sleep quality.    2.   The patient will demonstrate 75° of  cervical spine rotation,  50° of cervical flexion, and 35° of cervical extension in order to adequately monitor blind spots while driving and improve ability to perform activities of daily living.  3.   The patient will report a decrease in upper extremity radicular symptoms  by 75%.  4.   The patient will demonstrate 25% limitation as scored on the Neck Disability Index.    Progress strengthening /stabilization /functional activity       Timed:           Therapeutic Exercise:    20     mins  27185;     Ultrasound:     10     mins  [id number];  (with setup and clean  up)      Timed Treatment:   30   mins   Total Treatment:     30   mins    Cindy Meyers PTA  Physical Therapist Assistant License #[id number]

## 2025-07-07 ENCOUNTER — TREATMENT (OUTPATIENT)
Dept: PHYSICAL THERAPY | Facility: CLINIC | Age: 60
End: 2025-07-07
Payer: MEDICARE

## 2025-07-07 DIAGNOSIS — M54.2 PAIN, NECK: Primary | ICD-10-CM

## 2025-07-07 DIAGNOSIS — M43.6 NECK STIFFNESS: ICD-10-CM

## 2025-07-07 PROCEDURE — 97035 APP MDLTY 1+ULTRASOUND EA 15: CPT

## 2025-07-07 PROCEDURE — 97110 THERAPEUTIC EXERCISES: CPT

## 2025-07-07 NOTE — PROGRESS NOTES
Physical Therapy Daily Treatment Note      Patient: Yana Araya   : 1965  Diagnosis/ICD-10 Code:  Pain, neck [M54.2]  Referring practitioner: Zaida Ricks PA-C  Date of Initial Visit: Type: THERAPY  Noted: 2025  Today's Date: 2025  Patient seen for 6 sessions         Yana Araya reports: she went to after hours care following her last visit and the sent her to the hospital where they diagnosed her with  UTI and dehydration as well as low protein. Pt. Was given fluids and antibiotics while at the hospital and then prescribed a home regimen. Pt. States when she left the hospital she felt like a million bucks but today she is feeling more like she had been and is very fatigued and dizzy today.    Objective   See Exercise, Manual, and Modality Logs for complete treatment.     BP 80/63 mmHg  HR 89 BPM  SpO2 97%      Assessment/Plan  Pt. was educated on fluid intake and potential effects of dehydration on balance and fatigue. Pt. Denies drinking water but states she has been drinking Gatorade instead. Pt. BP is still measuring very low at this time and continues to appear sluggish no signs of Pallor however. Pt. Has a follow up with PCP on Wednesday and will follow up with us after.    Goals  Plan Goals: Short Term Goals: 4 weeks  1.    Pt will be independent with all exercises assigned to HEP.   2.   The patient will report 6/10 pain.     Long Term Goals: 12 weeks  1.    The patient will report 4/10 pain in order to more easily tolerate activities of daily living and improve sleep quality.    2.   The patient will demonstrate 75° of  cervical spine rotation,  50° of cervical flexion, and 35° of cervical extension in order to adequately monitor blind spots while driving and improve ability to perform activities of daily living.  3.   The patient will report a decrease in upper extremity radicular symptoms  by 75%.  4.   The patient will demonstrate 25% limitation as scored on the Neck  Disability Index.    Progress strengthening /stabilization /functional activity       Timed:           Therapeutic Exercise:    20     mins  12827;     Ultrasound:                     10     mins  [id number];  (with setup and clean up)       Timed Treatment:   30   mins   Total Treatment:     30   mins    Cindy Meyers PTA  Physical Therapist Assistant License #[id number]

## 2025-07-08 NOTE — PROGRESS NOTES
Chief Complaint  Annual Exam and Follow-up (From ER visit at [facility] on 7/4/2025)    Yolande Millan is a 60 y.o. female  who presents to [facility]     Patient Care Team:  Melania Damico APRN as PCP - General (Family Medicine)  Darian Babcock MD as Cardiologist (Interventional Cardiology)  Rey Shaw MD as Consulting Physician (Pulmonary Disease)  Matt Damian MD as Surgeon (Thoracic Surgery)  Mer Alvarado (Optometry)     History of Present Illness  Follow up from ER visit at [facility] on 7/4/2025    She went to After Hours Care on 7/4/2025 due to dizziness.  She was taken by EMS to [facility]    In the ER, she was orthostatic  - she had labs (CBC, CMP, Troponin, BNP) and imaging (chest xray, CT head)  - Given 2 L of LR and IV Cipro    Diagnosed with UTI  Prescribed Cipro 500 mg  BID for 10 days.  She is still taking antibiotic.  Discharged to home to follow up with PCP    Still has some dizziness.  Location: dizziness  Quality: feels off balance, no spinning  Severity: mild to severe  Duration: for last 2 weeks  Timing: intermittent  Context: Seen in the ER on 7/4/2025  Alleviating factors: sitting still  Aggravating factors: changing positions, moving head  Associated Symptoms: no tinnitus    She is going to PT for her neck     ++++++++++++++++++++++++    Adult Annual Wellness    Social History    Tobacco Use      Smoking status: Every Day        Packs/day: 1.00        Years: 1 pack/day for 41.5 years (41.5 ttl pk-yrs)        Types: Cigarettes        Start date: 1/1/1984        Passive exposure: Current      Smokeless tobacco: Never    Vaping Use      Vaping status: Never Used    Alcohol use: Never    Drug use: Never     Caffeine use -  coffee, tea, soda    General health habits  Last eye exam - last month; she has retinopathy  Last dental exam - > 3 years ago    Diet - Regular diet    Weight /  BMI - overweight,  BMI 28.8    Exercise - no    Hours of sleep per night ? 4-5  Just diagnosed with sleep apnea and will be starting a cpap    Safety -   Do you use seat belts consistently ? Yes   Working smoke detector in home ? Yes   Do you use sunscreen when outdoors ? No     Reproductive health -  Sexually active ? No   Birth control - hysterectomy  History of abnormal pap smear ? No  History of sexually transmitted infection ? Yes     Screening/prevention  Last mammogram/ breast cancer screening - 1/31/2024  Last pap smear/ cervical cancer screening - several years ago  Last DEXA scan - never  Colon cancer screening - colonoscopy 12/14/2023  CT low dose lung cancer screening - 4/8/2025    Immunizations -   Tdap - 3/2/2023  Pneumococcal vaccine - 8/23/2023  Shingles (Shingrix) - no      Yana Araya  has a past medical history of Anxiety, Arthritis, CAD (coronary artery disease), Cataract, Colon polyp, Depression, Diabetes, Diabetic retinopathy (08/22/2023), Difficulty walking, Dyslipidemia, Fibromyalgia, History of inferior wall myocardial infarction, Hyperlipidemia, MI (myocardial infarction), Multinodular thyroid (02/01/2024), Neuropathy in diabetes, Orthostatic hypotension, Paroxysmal atrial flutter, Peripheral vascular disease, Pulmonary emphysema (03/17/2024), and Sleep apnea.      Review of Systems   Constitutional:  Positive for fatigue. Negative for fever.   HENT:  Positive for congestion and ear pain (pressure). Negative for sore throat and tinnitus.    Eyes:  Negative for visual disturbance.   Respiratory:  Positive for cough. Negative for shortness of breath.    Cardiovascular:  Positive for palpitations (on occasion) and leg swelling (left leg). Negative for chest pain.   Gastrointestinal:  Positive for diarrhea (chronic). Negative for abdominal pain.   Endocrine: Positive for polydipsia. Negative for polyuria.   Genitourinary:  Negative for dysuria and hematuria.   Allergic/Immunologic: Positive  for environmental allergies.   Neurological:  Positive for dizziness and headaches.        Family History   Problem Relation Age of Onset    Diabetes Mother     Cancer Mother     Anxiety disorder Mother     Depression Mother     Dementia Father     Heart disease Sister     Arthritis Sister     Dementia Maternal Aunt     Breast cancer Maternal Aunt     Dementia Maternal Uncle     Breast cancer Paternal Aunt     Ovarian cancer Cousin         Past Surgical History:   Procedure Laterality Date    CARDIAC SURGERY      CATARACT EXTRACTION Bilateral 10/23/2023    and 10/30/2023    COLONOSCOPY  04/25/2014    polyps; Dr. Welsh    COLONOSCOPY N/A 12/14/2023    Procedure: COLONOSCOPY with cold forcep biopsy x2 areas and hot snare polypectomy x1;  Surgeon: Toribio Welsh MD;  Location: [facility] ENDOSCOPY;  Service: Gastroenterology;  Laterality: N/A;  Post-colon polyp    CORONARY STENT PLACEMENT  06/09/2021    FOOT SURGERY Left 06/28/2022    Left first MTP J arthroplasty without implant/Ochoa arthroplasty; Plantar left great toe ulceration debridement with skin graft application; Dr. Tyree Velasco at [facility]    HYSTERECTOMY  04/23/2002    Dr. Casillas; due to abnormal cervical cells and heavy bleeding    SALIVARY GLAND EXCISION      TONSILLECTOMY          Social History     Socioeconomic History    Marital status:     Number of children: 2    Years of education: 11    Highest education level: 11th grade   Tobacco Use    Smoking status: Every Day     Current packs/day: 1.00     Average packs/day: 1 pack/day for 41.5 years (41.5 ttl pk-yrs)     Types: Cigarettes     Start date: 1/1/1984     Passive exposure: Current    Smokeless tobacco: Never   Vaping Use    Vaping status: Never Used   Substance and Sexual Activity    Alcohol use: Never    Drug use: Never    Sexual activity: Not Currently     Partners: Male     Birth control/protection: Hysterectomy        Immunization History   Administered Date(s)  Administered    Pneumococcal Conjugate 20-Valent (PCV20) 2023    Tdap 2023     Menstrual History:  OB History          2    Para   2    Term   2            AB   0    Living   0         SAB        IAB        Ectopic        Molar        Multiple        Live Births   0          Obstetric Comments   LMP   Menarche 13              No LMP recorded. Patient has had a hysterectomy.        Objective       Current Outpatient Medications:     acetaminophen (TYLENOL) 500 MG tablet, Take 2 tablets by mouth Every 6 (Six) Hours As Needed for Mild Pain., Disp: , Rfl:     albuterol sulfate  (90 Base) MCG/ACT inhaler, INHALE 1 TO 2 PUFFS BY MOUTH EVERY 4 TO 6 HOURS AS NEEDED FOR COUGH AND FOR SHORTNESS OF BREATH, Disp: 9 g, Rfl: 3    apixaban (Eliquis) 5 MG tablet tablet, Take 1 tablet by mouth Every 12 (Twelve) Hours. PLEASE CALL OFFICE, Disp: 60 tablet, Rfl: 12    atenolol (TENORMIN) 25 MG tablet, Take 1 tablet by mouth Daily. (Patient taking differently: Take 1 tablet by mouth Daily. As needed), Disp: 90 tablet, Rfl: 3    atorvastatin (LIPITOR) 80 MG tablet, Take 1 tablet by mouth once daily, Disp: 90 tablet, Rfl: 0    Blood Glucose Monitoring Suppl w/Device kit, 1 Device Daily. DX E11.9, Disp: 1 each, Rfl: 0    carboxymethylcellulose (REFRESH PLUS) 0.5 % solution, Administer  to both eyes 3 (Three) Times a Day As Needed for Dry Eyes., Disp: , Rfl:     ciprofloxacin (CIPRO) 500 MG tablet, Take 1 tablet by mouth Every 12 (Twelve) Hours., Disp: , Rfl:     clopidogrel (PLAVIX) 75 MG tablet, Take 1 tablet by mouth Daily., Disp: , Rfl:     empagliflozin (JARDIANCE) 25 MG tablet tablet, Take 1 tablet by mouth Daily., Disp: 90 tablet, Rfl: 1    glucose blood test strip, 1 each by Other route 4 (Four) Times a Day Before Meals & at Bedtime. Accu-Chek, Disp: 400 each, Rfl: 5    Insulin Glargine (LANTUS SOLOSTAR) 100 UNIT/ML injection pen, Inject 15 Units under the skin into the appropriate area as  "directed Every Night., Disp: 15 mL, Rfl: 12    Insulin Lispro, 1 Unit Dial, (HumaLOG KwikPen) 100 UNIT/ML solution pen-injector, Inject 3 Units under the skin into the appropriate area as directed 3 (Three) Times a Day With Meals., Disp: 15 mL, Rfl: 1    Insulin Pen Needle (Pen Needles) 32G X 4 MM misc, Use 1 each 4 (Four) Times a Day Before Meals & at Bedtime., Disp: 400 each, Rfl: 5    isosorbide mononitrate (IMDUR) 30 MG 24 hr tablet, Take 1 tablet by mouth Daily., Disp: , Rfl:     Lancets misc, Use 1 each 4 (Four) Times a Day Before Meals & at Bedtime. Accu-Chek, Disp: 400 each, Rfl: 5    loperamide (IMODIUM A-D) 2 MG tablet, Take 1 tablet by mouth 4 (Four) Times a Day As Needed for Diarrhea., Disp: 60 tablet, Rfl: 1    loratadine (CLARITIN) 10 MG tablet, Take 1 tablet by mouth Daily., Disp: 90 tablet, Rfl: 3    montelukast (SINGULAIR) 10 MG tablet, Take 1 tablet by mouth Every Night., Disp: 90 tablet, Rfl: 3    nystatin (MYCOSTATIN) 921190 UNIT/GM cream, Apply 1 application  topically to the appropriate area as directed 2 (Two) Times a Day. (Patient taking differently: Apply 1 Application topically to the appropriate area as directed As Needed.), Disp: 60 g, Rfl: 1    pantoprazole (PROTONIX) 40 MG EC tablet, Take 1 tablet by mouth Daily., Disp: 30 tablet, Rfl: 3    PARoxetine (PAXIL) 40 MG tablet, Take 1 tablet by mouth once daily, Disp: 90 tablet, Rfl: 0    valACYclovir (VALTREX) 500 MG tablet, Take 1 tablet by mouth 2 (Two) Times a Day., Disp: , Rfl:     Wheat Dextrin (Benefiber) powder, 2 tsp by mouth three times a day, Disp: 730 g, Rfl: 5    fluticasone (FLONASE) 50 MCG/ACT nasal spray, 1-2 sprays in each nostril once daily for allergies, Disp: 16 g, Rfl: 5    Vital Signs:      /71   Pulse 79   Temp 97.4 °F (36.3 °C) (Temporal)   Resp 18   Ht 165.1 cm (65\")   Wt 78.6 kg (173 lb 3.2 oz)   SpO2 98%   BMI 28.82 kg/m²     Vitals:    07/09/25 1020   BP: 104/71   Pulse: 79   Resp: 18   Temp: 97.4 °F " "(36.3 °C)   TempSrc: Temporal   SpO2: 98%   Weight: 78.6 kg (173 lb 3.2 oz)   Height: 165.1 cm (65\")      Physical Exam  Vitals reviewed.   Constitutional:       General: She is not in acute distress.     Appearance: Normal appearance. She is overweight.      Comments: Using cane   HENT:      Head: Normocephalic and atraumatic.      Right Ear: Ear canal and external ear normal. A middle ear effusion is present. Tympanic membrane is retracted.      Left Ear: Ear canal and external ear normal. A middle ear effusion is present. Tympanic membrane is retracted.      Nose: Congestion present.      Mouth/Throat:      Mouth: Mucous membranes are moist.      Pharynx: Oropharynx is clear. No oropharyngeal exudate.   Eyes:      General: No scleral icterus.     Conjunctiva/sclera: Conjunctivae normal.   Neck:      Thyroid: No thyromegaly.   Cardiovascular:      Rate and Rhythm: Normal rate and regular rhythm.      Heart sounds: Normal heart sounds.   Pulmonary:      Effort: Pulmonary effort is normal. No respiratory distress.      Breath sounds: Normal breath sounds.   Abdominal:      General: Bowel sounds are normal. There is no distension.      Palpations: Abdomen is soft.      Tenderness: There is no abdominal tenderness. There is no right CVA tenderness or left CVA tenderness.   Musculoskeletal:      Left shoulder: Normal.      Cervical back: Neck supple.      Right lower leg: No edema.      Left lower leg: No edema.   Lymphadenopathy:      Cervical: No cervical adenopathy.   Skin:     General: Skin is warm and dry.   Neurological:      Mental Status: She is alert and oriented to person, place, and time.   Psychiatric:         Mood and Affect: Mood normal.         Behavior: Behavior normal.        Result Review :   The following data was reviewed by: MAGNUS Ross on 07/09/2025:  CMP          10/23/2024    07:55   CMP   Glucose 304    BUN 12    Creatinine 0.60    EGFR 104    Sodium 138    Potassium 4.2    Chloride " 98    Calcium 9.1    Total Protein 5.7    Albumin 3.7    Globulin 2.0    Total Bilirubin 0.3    Alkaline Phosphatase 133    AST (SGOT) 19    ALT (SGPT) 31    BUN/Creatinine Ratio 20      CBC w/diff          10/23/2024    07:55   CBC w/Diff   WBC 10.7    RBC 5.53    Hemoglobin 15.0    Hematocrit 47.9    MCV 87    MCH 27.1    MCHC 31.3    RDW 12.7    Platelets 185    Neutrophil Rel % 55    Lymphocyte Rel % 38    Monocyte Rel % 5    Eosinophil Rel % 1    Basophil Rel % 1      Lipid Panel          10/23/2024    07:55   Lipid Panel   Total Cholesterol 137    Triglycerides 145    HDL Cholesterol 53    VLDL Cholesterol 25    LDL Cholesterol  59      TSH          10/23/2024    07:55   TSH   TSH 1.200      A1C Last 3 Results          11/21/2024    10:47 3/4/2025    16:05   HGBA1C Last 3 Results   Hemoglobin A1C 15.0  13.3      Microalbumin          1/17/2025    00:00   Microalbumin   Microalbumin, Urine <3.0      Data reviewed : ER note, labs, imaging from [facility] on 7/4/2025            Assessment and Plan    Diagnoses and all orders for this visit:    1. Wellness examination (Primary)  Assessment & Plan:  Discussed preventative healthcare.  Labs reviewed. Recommended annual eye and dental exams. Recommended use of seatbelts, sunscreen and smoke detectors in the home.  Tdap up to date.  Breast cancer screening (mammogram) up to date. Colon cancer screening (colonoscopy) up to date. Lung cancer screening up to date.         2. Follow-up from ER visit  Comments:  [facility] ER on 7/4/2025    3. Urinary tract infection without hematuria, site unspecified  Comments:  Finish Cipro as prescribed    4. ETD (Eustachian tube dysfunction), bilateral  Comments:  Begin Flonase nasal spray  Orders:  -     fluticasone (FLONASE) 50 MCG/ACT nasal spray; 1-2 sprays in each nostril once daily for allergies  Dispense: 16 g; Refill: 5    5. Dizzy  Comments:  Refer to PT  Orders:  -     Ambulatory Referral to  Physical Therapy for Evaluation & Treatment    6. Type 2 diabetes mellitus with hyperglycemia, with long-term current use of insulin  Overview:  Hgba1c 14 % on 8/23/2023  Hgba1c 13.1 % on 12/15/2023  Hgba1c 15 % on 3/15/2024  Hgba1c 12.6 % on 6/28/2024  Hgba1c 15 % on 11/21/2024  Hgba1c 13.3 % 03/04/2025    Assessment & Plan:  Chronic problem.  Uncontrolled.  She is not taking Lantus and Humalog as prescribed.  States she takes it maybe once or twice a week.  Keep appointment with endocrinology as scheduled on 8/4/2025      7. Essential hypertension  Assessment & Plan:  Hypertension is stable and controlled  Continue current treatment regimen.      8. Dyslipidemia  Assessment & Plan:  Chronic stable problem.  Continue atorvastatin 80 mg daily.    Recheck fasting lipid panel in October 2025      9. Chronic diarrhea  Comments:  Insurance would not approve Welchol  Refer to GI  Orders:  -     Ambulatory Referral to Gastroenterology    10. Coronary artery disease involving native coronary artery of native heart without angina pectoris  Overview:  Sees Dr. Darian Babcock    Assessment & Plan:  Chronic stable problem.  Follow up with cardiology as scheduled on 7/15/2025      11. Diabetic retinopathy of both eyes associated with type 2 diabetes mellitus, macular edema presence unspecified, unspecified retinopathy severity  Overview:  Saw Dr. Alvarado 8/22/2023  - Type 2 diabetes mellitus with right eye affected by mild nonproliferative retinopathy without macular edema.  Saw Dr. Alvarado 6/14/2024 - Type 2 diabetes mellitus with left eye affected by mild nonproliferative retinopathy without macular edema and right eye affected by moderate nonproliferative retinopathy without macular edema    Assessment & Plan:  Established problem  Follow up with eye doctor as scheduled.      12. Multinodular thyroid  Overview:  Thyroid US 2/1/2024       13. Tobacco dependence due to cigarettes  Assessment & Plan:  Yana Araya  reports  that she has been smoking cigarettes. She started smoking about 41 years ago. She has a 41.5 pack-year smoking history. She has been exposed to tobacco smoke. She has never used smokeless tobacco. I have educated her on the risk of diseases from using tobacco products such as cancer, COPD, and heart disease.     I advised her to quit and she is not ready to quit.        14. Noncompliance with medication regimen  Assessment & Plan:  She continues to be noncompliant with medications.  Again discussed ways to remember to take her medication.    Discussed diabetes is not under control. Discussed sequela of uncontrolled diabetes such as blindness, renal failure, heart attack, stroke and/or death. Discussed importance of taking medications as prescribed and communicating any side effects or problems with treatment plan.           Advised to complete antibiotic.    Recheck urinalysis after completion of antibiotic.  Keep appointment with cardiology as scheduled on 7/15/2025      Follow Up   Return in about 4 months (around 11/9/2025) for Medicare Wellness.  Patient was given instructions and counseling regarding her condition or for health maintenance advice. Please see specific information pulled into the AVS if appropriate.    Patient Instructions   Follow up with cardiology on 7/15/2025 as scheduled.  Follow up with endocrinology (diabetes), Dr. Wolfe, on 8/4/2025 as scheduled    Call [facility] (gastroenterology) for an appointment for chronic diarrhea. Phone [phone].  You saw Dr. Welsh for your colonoscopy.

## 2025-07-09 ENCOUNTER — OFFICE VISIT (OUTPATIENT)
Dept: FAMILY MEDICINE CLINIC | Facility: CLINIC | Age: 60
End: 2025-07-09
Payer: MEDICARE

## 2025-07-09 VITALS
HEART RATE: 79 BPM | OXYGEN SATURATION: 98 % | BODY MASS INDEX: 28.86 KG/M2 | TEMPERATURE: 97.4 F | RESPIRATION RATE: 18 BRPM | WEIGHT: 173.2 LBS | DIASTOLIC BLOOD PRESSURE: 71 MMHG | SYSTOLIC BLOOD PRESSURE: 104 MMHG | HEIGHT: 65 IN

## 2025-07-09 DIAGNOSIS — Z79.4 TYPE 2 DIABETES MELLITUS WITH HYPERGLYCEMIA, WITH LONG-TERM CURRENT USE OF INSULIN: ICD-10-CM

## 2025-07-09 DIAGNOSIS — E04.2 MULTINODULAR THYROID: ICD-10-CM

## 2025-07-09 DIAGNOSIS — Z09 FOLLOW-UP EXAM: ICD-10-CM

## 2025-07-09 DIAGNOSIS — H69.93 ETD (EUSTACHIAN TUBE DYSFUNCTION), BILATERAL: ICD-10-CM

## 2025-07-09 DIAGNOSIS — E11.319 DIABETIC RETINOPATHY OF BOTH EYES ASSOCIATED WITH TYPE 2 DIABETES MELLITUS, MACULAR EDEMA PRESENCE UNSPECIFIED, UNSPECIFIED RETINOPATHY SEVERITY: ICD-10-CM

## 2025-07-09 DIAGNOSIS — F17.210 TOBACCO DEPENDENCE DUE TO CIGARETTES: ICD-10-CM

## 2025-07-09 DIAGNOSIS — R42 DIZZY: ICD-10-CM

## 2025-07-09 DIAGNOSIS — E11.65 TYPE 2 DIABETES MELLITUS WITH HYPERGLYCEMIA, WITH LONG-TERM CURRENT USE OF INSULIN: ICD-10-CM

## 2025-07-09 DIAGNOSIS — K52.9 CHRONIC DIARRHEA: ICD-10-CM

## 2025-07-09 DIAGNOSIS — Z91.148 NONCOMPLIANCE WITH MEDICATION REGIMEN: ICD-10-CM

## 2025-07-09 DIAGNOSIS — I25.10 CORONARY ARTERY DISEASE INVOLVING NATIVE CORONARY ARTERY OF NATIVE HEART WITHOUT ANGINA PECTORIS: ICD-10-CM

## 2025-07-09 DIAGNOSIS — E78.5 DYSLIPIDEMIA: ICD-10-CM

## 2025-07-09 DIAGNOSIS — N39.0 URINARY TRACT INFECTION WITHOUT HEMATURIA, SITE UNSPECIFIED: ICD-10-CM

## 2025-07-09 DIAGNOSIS — I10 ESSENTIAL HYPERTENSION: ICD-10-CM

## 2025-07-09 DIAGNOSIS — Z00.00 WELLNESS EXAMINATION: Primary | ICD-10-CM

## 2025-07-09 RX ORDER — FLUTICASONE PROPIONATE 50 MCG
SPRAY, SUSPENSION (ML) NASAL
Qty: 16 G | Refills: 5 | Status: SHIPPED | OUTPATIENT
Start: 2025-07-09

## 2025-07-09 RX ORDER — CIPROFLOXACIN 500 MG/1
1 TABLET, FILM COATED ORAL EVERY 12 HOURS SCHEDULED
COMMUNITY
Start: 2025-07-04

## 2025-07-09 NOTE — PATIENT INSTRUCTIONS
Follow up with cardiology on 7/15/2025 as scheduled.  Follow up with endocrinology (diabetes), Dr. Wolfe, on 8/4/2025 as scheduled    Call [facility] (gastroenterology) for an appointment for chronic diarrhea. Phone [phone].  You saw Dr. Welsh for your colonoscopy.

## 2025-07-12 NOTE — ASSESSMENT & PLAN NOTE
Chronic problem.  Uncontrolled.  She is not taking Lantus and Humalog as prescribed.  States she takes it maybe once or twice a week.  Keep appointment with endocrinology as scheduled on 8/4/2025

## 2025-07-12 NOTE — ASSESSMENT & PLAN NOTE
Discussed preventative healthcare.  Labs reviewed. Recommended annual eye and dental exams. Recommended use of seatbelts, sunscreen and smoke detectors in the home.  Tdap up to date.  Breast cancer screening (mammogram) up to date. Colon cancer screening (colonoscopy) up to date. Lung cancer screening up to date.

## 2025-07-12 NOTE — ASSESSMENT & PLAN NOTE
She continues to be noncompliant with medications.  Again discussed ways to remember to take her medication.    Discussed diabetes is not under control. Discussed sequela of uncontrolled diabetes such as blindness, renal failure, heart attack, stroke and/or death. Discussed importance of taking medications as prescribed and communicating any side effects or problems with treatment plan.

## 2025-07-12 NOTE — ASSESSMENT & PLAN NOTE
Yana Araya  reports that she has been smoking cigarettes. She started smoking about 41 years ago. She has a 41.5 pack-year smoking history. She has been exposed to tobacco smoke. She has never used smokeless tobacco. I have educated her on the risk of diseases from using tobacco products such as cancer, COPD, and heart disease.     I advised her to quit and she is not ready to quit.

## 2025-07-14 ENCOUNTER — TELEPHONE (OUTPATIENT)
Dept: FAMILY MEDICINE CLINIC | Facility: CLINIC | Age: 60
End: 2025-07-14
Payer: MEDICARE

## 2025-07-15 ENCOUNTER — OFFICE VISIT (OUTPATIENT)
Dept: PAIN MEDICINE | Facility: CLINIC | Age: 60
End: 2025-07-15
Payer: MEDICARE

## 2025-07-15 VITALS
WEIGHT: 17.7 LBS | HEART RATE: 84 BPM | OXYGEN SATURATION: 96 % | DIASTOLIC BLOOD PRESSURE: 65 MMHG | SYSTOLIC BLOOD PRESSURE: 97 MMHG | BODY MASS INDEX: 2.95 KG/M2 | RESPIRATION RATE: 16 BRPM

## 2025-07-15 DIAGNOSIS — M54.12 CERVICAL RADICULITIS: ICD-10-CM

## 2025-07-15 DIAGNOSIS — M79.18 MYOFASCIAL PAIN SYNDROME: ICD-10-CM

## 2025-07-15 DIAGNOSIS — M47.812 CERVICAL SPONDYLOSIS WITHOUT MYELOPATHY: Primary | ICD-10-CM

## 2025-07-15 NOTE — PROGRESS NOTES
Subjective   CC neck pain, bilateral arm pain  Yana Araya is a 60 y.o. female with chronic neck pain here for initial valuation.  Referred by neurosurgery  Complains of several months of worsening neck pain radiating to both arms and worse on the right, constant worse with neck movement activity and at night.  Denies weakness, bladder or bowel incontinence or balance issues.  Tried physical therapy with marginal relief.  Tried anti-inflammatory muscle relaxers without relief.  Pain is interfering with ADL and sleep.  Seen neurosurgery, referred to try injections..     Imaging reviewed  C-spine MRI : Multilevel advanced degenerative changes, moderate to severe spinal and foraminal stenosis at C5-C6.    Pain Assessment   Location of Pain: Lower Back,  neck pain, joint  Description of Pain: Dull/Aching, Throbbing, Stabbing  Previous Pain Rating :4  Current Pain Ratin  Aggravating Factors: Activity  Alleviating Factors: Rest, Medication  Pain onset over 12 weeks ago  Interferes with ADL's.   [place] back pain disability scale scanned in chart    PEG Assessment   What number best describes your pain on average in the past week?4  What number best describes how, during the past week, pain has interfered with your enjoyment of life?4  What number best describes how, during the past week, pain has interfered with your general activity?9      The following portions of the patient's history were reviewed and updated as appropriate: allergies, current medications, past family history, past medical history, past social history, past surgical history and problem list.     has a past medical history of Anxiety, Arthritis, CAD (coronary artery disease), Cataract, Colon polyp, Depression, Diabetes, Diabetic retinopathy (2023), Difficulty walking, Dyslipidemia, Fibromyalgia, History of inferior wall myocardial infarction, Hyperlipidemia, MI (myocardial infarction), Multinodular thyroid (2024), Neuropathy in  diabetes, Orthostatic hypotension, Paroxysmal atrial flutter, Peripheral vascular disease, Pulmonary emphysema (03/17/2024), and Sleep apnea.   has a past surgical history that includes Coronary stent placement (06/09/2021); Salivary Gland Excision; Tonsillectomy; Hysterectomy (04/23/2002); Foot surgery (Left, 06/28/2022); Colonoscopy (04/25/2014); Cataract extraction (Bilateral, 10/23/2023); Colonoscopy (N/A, 12/14/2023); and Cardiac surgery.  family history includes Anxiety disorder in her mother; Arthritis in her sister; Breast cancer in her maternal aunt and paternal aunt; Cancer in her mother; Dementia in her father, maternal aunt, and maternal uncle; Depression in her mother; Diabetes in her mother; Heart disease in her sister; Ovarian cancer in her cousin.  Social History     Tobacco Use    Smoking status: Every Day     Current packs/day: 1.00     Average packs/day: 1 pack/day for 41.5 years (41.5 ttl pk-yrs)     Types: Cigarettes     Start date: 1/1/1984     Passive exposure: Current    Smokeless tobacco: Never   Substance Use Topics    Alcohol use: Never       Review of Systems   Musculoskeletal:  Positive for neck pain.   All other systems reviewed and are negative.      Objective   Physical Exam  Vitals reviewed.   Constitutional:       General: She is not in acute distress.  Pulmonary:      Effort: Pulmonary effort is normal.   Musculoskeletal:      Cervical back: Tenderness present. Decreased range of motion.      Comments: Positive Spurling's       BP 97/65   Pulse 84   Resp 16   Wt 8.029 kg (17 lb 11.2 oz)   SpO2 96%   BMI 2.95 kg/m²     PHQ 9 on chart  Opioid risk tool low risk      Assessment & Plan   Diagnoses and all orders for this visit:    1. Cervical spondylosis without myelopathy (Primary)    2. Cervical radiculitis  -     Epidural Block    3. Myofascial pain syndrome      Summary  Yana Araya is a 60 y.o. female with chronic neck pain here for initial valuation.  Referred by  neurosurgery  Complains of several months of worsening neck pain radiating to both arms and worse on the right, constant worse with neck movement activity and at night.  Denies weakness, bladder or bowel incontinence or balance issues.  Tried physical therapy with marginal relief.  Tried anti-inflammatory muscle relaxers without relief.  Pain is interfering with ADL and sleep.  Seen neurosurgery, referred to try injections..     Worsening neck pain with bilateral upper extremity radicular pain.  C-spine MRI with advanced degenerative changes, left foraminal stenosis.  Marginal relief with medication or physical therapy.  Pain is significantly interfering with ADL and sleep.  Will schedule for cervical AZALIA.  Risk and benefits discussed    RTC for procedure        Note is dictated utilizing voice recognition software. Unfortunately this leads to occasional typographical errors. I apologize in advance if the situation occurs. If questions occur please do not hesitate to call our office.

## 2025-07-17 ENCOUNTER — NURSE TRIAGE (OUTPATIENT)
Dept: CALL CENTER | Facility: HOSPITAL | Age: 60
End: 2025-07-17
Payer: MEDICARE

## 2025-07-17 NOTE — TELEPHONE ENCOUNTER
"Reason for Disposition   [1] MODERATE dizziness (e.g., interferes with normal activities) AND [2] has been evaluated by doctor (or NP/PA) for this    Additional Information   Negative: SEVERE difficulty breathing (e.g., struggling for each breath, speaks in single words)   Negative: [1] Difficulty breathing or swallowing AND [2] started suddenly after medicine, an allergic food or bee sting   Negative: Shock suspected (e.g., cold/pale/clammy skin, too weak to stand, low BP, rapid pulse)   Negative: Difficult to awaken or acting confused (e.g., disoriented, slurred speech)   Negative: [1] Weakness (i.e., paralysis, loss of muscle strength) of the face, arm or leg on one side of the body AND [2] sudden onset AND [3] present now   Negative: [1] Numbness (i.e., loss of sensation) of the face, arm or leg on one side of the body AND [2] sudden onset AND [3] present now   Negative: [1] Loss of speech or garbled speech AND [2] sudden onset AND [3] present now   Negative: Overdose (accidental or intentional) of medications   Negative: [1] Fainted > 15 minutes ago AND [2] still feels too weak or dizzy to stand   Negative: Heart beating < 50 beats per minute OR > 140 beats per minute   Negative: Sounds like a life-threatening emergency to the triager   Negative: Chest pain   Negative: Rectal bleeding, bloody stool, or tarry-black stool   Negative: [1] Vomiting AND [2] contains red blood or black (\"coffee ground\") material   Negative: Vomiting is main symptom   Negative: Diarrhea is main symptom   Negative: Headache is main symptom   Negative: Patient states that they are having an anxiety or panic attack   Negative: Dizziness from low blood sugar (i.e., < 60 mg/dl or 3.5 mmol/l)   Negative: Dizziness is described as a spinning sensation (i.e., vertigo)   Negative: Heat exhaustion suspected (i.e., dehydration from heat exposure)   Negative: Difficulty breathing   Negative: SEVERE dizziness (e.g., unable to stand, requires " "support to walk, feels like passing out now)   Negative: Extra heartbeats, irregular heart beating, or heart is beating very fast  (i.e., \"palpitations\")   Negative: [1] Drinking very little AND [2] dehydration suspected (e.g., no urine > 12 hours, very dry mouth, very lightheaded)   Negative: [1] Weakness (i.e., paralysis, loss of muscle strength) of the face, arm / hand, or leg / foot on one side of the body AND [2] sudden onset AND [3] brief (now gone)   Negative: [1] Numbness (i.e., loss of sensation) of the face, arm / hand, or leg / foot on one side of the body AND [2] sudden onset AND [3] brief (now gone)   Negative: [1] Loss of speech or garbled speech AND [2] sudden onset AND [3] brief (now gone)   Negative: Loss of vision or double vision  (Exception: Similar to previous migraines.)   Negative: Patient sounds very sick or weak to the triager   Negative: [1] Dizziness caused by heat exposure, sudden standing, or poor fluid intake AND [2] no improvement after 2 hours of rest and fluids   Negative: [1] Fever > 103 F (39.4 C) AND [2] not able to get the fever down using Fever Care Advice   Negative: [1] Fever > 101 F (38.3 C) AND [2] age > 60 years   Negative: [1] Fever > 100.0 F (37.8 C) AND [2] bedridden (e.g., CVA, chronic illness, recovering from surgery)   Negative: [1] Fever > 100.0 F (37.8 C) AND [2] diabetes mellitus or weak immune system (e.g., HIV positive, cancer chemo, splenectomy, organ transplant, chronic steroids)   Negative: [1] MODERATE dizziness (e.g., interferes with normal activities) AND [2] has NOT been evaluated by doctor (or NP/PA) for this  (Exception: Dizziness caused by heat exposure, sudden standing, or poor fluid intake.)   Negative: Fever present > 3 days (72 hours)   Negative: Taking a medicine that could cause dizziness (e.g., blood pressure medications, diuretics)   Negative: [1] MILD dizziness (e.g., walking normally) AND [2] has NOT been evaluated by doctor (or NP/PA) for " "this  (Exception: Dizziness caused by heat exposure, sudden standing, or poor fluid intake.)   Negative: Substance use (drug use) or unhealthy alcohol use, known or suspected   Negative: [1] MILD dizziness (e.g., walking normally) AND [2] has been evaluated by doctor (or NP/PA) for this   Negative: Dizziness is a chronic symptom (recurrent or ongoing AND present > 4 weeks)   Negative: Dizziness caused by not drinking enough fluids   Negative: Dizziness caused by sudden or prolonged standing   Negative: Dizziness caused by recent heat exposure    Answer Assessment - Initial Assessment Questions  1. DESCRIPTION: \"Describe your dizziness.\"      Only when she goes to stand up. Reports tinnitus.    2. LIGHTHEADED: \"Do you feel lightheaded?\" (e.g., somewhat faint, woozy, weak upon standing)      yes  3. VERTIGO: \"Do you feel like either you or the room is spinning or tilting?\" (i.e. vertigo)      No  4. SEVERITY: \"How bad is it?\"  \"Do you feel like you are going to faint?\" \"Can you stand and walk?\"    - MILD: Feels slightly dizzy, but walking normally.    - MODERATE: Feels unsteady when walking, but not falling; interferes with normal activities (e.g., school, work).    - SEVERE: Unable to walk without falling, or requires assistance to walk without falling; feels like passing out now.       Moderate feels like she needs assistance  5. ONSET:  \"When did the dizziness begin?\"      3 weeks  6. AGGRAVATING FACTORS: \"Does anything make it worse?\" (e.g., standing, change in head position)      Standing up  7. HEART RATE: \"Can you tell me your heart rate?\" \"How many beats in 15 seconds?\"  (Note: not all patients can do this)        Fast when feeling dizzy  8. CAUSE: \"What do you think is causing the dizziness?\"      Was referred to a doctor for her memory issues.  Scans showed she had \"some places on her brain\".  Should she call him about this?   9. RECURRENT SYMPTOM: \"Have you had dizziness before?\" If Yes, ask: \"When was the " "last time?\" \"What happened that time?\"      Yesterday. She was at the eye doctor.  [place] way the entire she was there.  The eye doctor assures her that the shots to her eyes have nothing to do with this.  She has diabetes and getting treatment.    10. OTHER SYMPTOMS: \"Do you have any other symptoms?\" (e.g., fever, chest pain, vomiting, diarrhea, bleeding)        No fever, CP or vomiting  11. PREGNANCY: \"Is there any chance you are pregnant?\" \"When was your last menstrual period?\"        na    Protocols used: Dizziness - Lightheadedness-ADULT-AH    "

## 2025-07-18 ENCOUNTER — OFFICE VISIT (OUTPATIENT)
Dept: FAMILY MEDICINE CLINIC | Facility: CLINIC | Age: 60
End: 2025-07-18
Payer: MEDICARE

## 2025-07-18 VITALS
OXYGEN SATURATION: 93 % | WEIGHT: 171.8 LBS | TEMPERATURE: 97.1 F | RESPIRATION RATE: 18 BRPM | HEIGHT: 65 IN | BODY MASS INDEX: 28.62 KG/M2

## 2025-07-18 DIAGNOSIS — I10 ESSENTIAL HYPERTENSION: ICD-10-CM

## 2025-07-18 DIAGNOSIS — K52.9 CHRONIC DIARRHEA: ICD-10-CM

## 2025-07-18 DIAGNOSIS — R26.81 UNSTEADY GAIT: ICD-10-CM

## 2025-07-18 DIAGNOSIS — E11.65 TYPE 2 DIABETES MELLITUS WITH HYPERGLYCEMIA, WITH LONG-TERM CURRENT USE OF INSULIN: ICD-10-CM

## 2025-07-18 DIAGNOSIS — R42 DIZZY: Primary | ICD-10-CM

## 2025-07-18 DIAGNOSIS — Z79.4 TYPE 2 DIABETES MELLITUS WITH HYPERGLYCEMIA, WITH LONG-TERM CURRENT USE OF INSULIN: ICD-10-CM

## 2025-07-18 DIAGNOSIS — R27.0 ATAXIA: ICD-10-CM

## 2025-07-18 RX ORDER — COLESTIPOL HYDROCHLORIDE 1 G/1
1 TABLET ORAL 2 TIMES DAILY
Qty: 60 TABLET | Refills: 2 | Status: SHIPPED | OUTPATIENT
Start: 2025-07-18

## 2025-07-18 RX ORDER — COLESEVELAM 180 1/1
TABLET ORAL
Qty: 120 TABLET | Refills: 5 | Status: SHIPPED | OUTPATIENT
Start: 2025-07-18 | End: 2025-07-18

## 2025-07-18 NOTE — PROGRESS NOTES
"Chief Complaint  Dizziness    Subjective          Yana is a 60 y.o. female  who presents to [facility]     Patient Care Team:  Melania Damico APRN as PCP - General (Family Medicine)  Darian Babcock MD as Cardiologist (Interventional Cardiology)  Rey Shaw MD as Consulting Physician (Pulmonary Disease)  Matt Damian MD as Surgeon (Thoracic Surgery)  Mer Alvarado (Optometry)     History of Present Illness  Yana is here today for dizziness and unsteady on feet    States it was really bad on Wednesday when she was at the eye doctor.  Having difficulty walking  Speech was good.    Symptoms were similar as to when she went to [facility] the ER at [facility] on 7/4/2025    She is afraid she is having a stroke.      Location: dizziness  Quality: feels off balance, no spinning  Severity: mild to severe  Duration: for 3 weeks  Timing: intermittent  Context: Seen in the ER on 7/4/2025  Had \"bad\" episode on 7/16/2025  She thinks this may have started after doing physical therapy for neck  Alleviating factors: sitting still  Aggravating factors: changing positions, moving head  Associated Symptoms: no tinnitus    Insurance denied Welchol for chronic diarrhea.        Yana Araya  has a past medical history of Anxiety, Arthritis, CAD (coronary artery disease), Cataract, Colon polyp, Depression, Diabetes, Diabetic retinopathy (08/22/2023), Difficulty walking, Dyslipidemia, Fibromyalgia, History of inferior wall myocardial infarction, Hyperlipidemia, MI (myocardial infarction), Multinodular thyroid (02/01/2024), Neuropathy in diabetes, Orthostatic hypotension, Paroxysmal atrial flutter, Peripheral vascular disease, Pulmonary emphysema (03/17/2024), and Sleep apnea.      Review of Systems   Musculoskeletal:  Positive for neck pain.   Neurological:  Positive for dizziness, weakness and headaches.   Psychiatric/Behavioral:  Positive for sleep " disturbance.         Family History   Problem Relation Age of Onset    Diabetes Mother     Cancer Mother     Anxiety disorder Mother     Depression Mother     Dementia Father     Heart disease Sister     Arthritis Sister     Dementia Maternal Aunt     Breast cancer Maternal Aunt     Dementia Maternal Uncle     Breast cancer Paternal Aunt     Ovarian cancer Cousin         Past Surgical History:   Procedure Laterality Date    CARDIAC SURGERY      CATARACT EXTRACTION Bilateral 10/23/2023    and 10/30/2023    COLONOSCOPY  04/25/2014    polyps; Dr. Welsh    COLONOSCOPY N/A 12/14/2023    Procedure: COLONOSCOPY with cold forcep biopsy x2 areas and hot snare polypectomy x1;  Surgeon: Toribio Welsh MD;  Location: [facility] ENDOSCOPY;  Service: Gastroenterology;  Laterality: N/A;  Post-colon polyp    CORONARY STENT PLACEMENT  06/09/2021    FOOT SURGERY Left 06/28/2022    Left first MTP J arthroplasty without implant/Ochoa arthroplasty; Plantar left great toe ulceration debridement with skin graft application; Dr. Tyree Velasco at [facility]    HYSTERECTOMY  04/23/2002    Dr. Casillas; due to abnormal cervical cells and heavy bleeding    SALIVARY GLAND EXCISION      TONSILLECTOMY          Social History     Socioeconomic History    Marital status:     Number of children: 2    Years of education: 11    Highest education level: 11th grade   Tobacco Use    Smoking status: Every Day     Current packs/day: 1.00     Average packs/day: 1 pack/day for 41.5 years (41.5 ttl pk-yrs)     Types: Cigarettes     Start date: 1/1/1984     Passive exposure: Current    Smokeless tobacco: Never   Vaping Use    Vaping status: Never Used   Substance and Sexual Activity    Alcohol use: Never    Drug use: Never    Sexual activity: Not Currently     Partners: Male     Birth control/protection: Hysterectomy        Immunization History   Administered Date(s) Administered    Pneumococcal Conjugate 20-Valent (PCV20) 08/23/2023    Tdap  03/02/2023       Objective       Current Outpatient Medications:     acetaminophen (TYLENOL) 500 MG tablet, Take 2 tablets by mouth Every 6 (Six) Hours As Needed for Mild Pain., Disp: , Rfl:     albuterol sulfate  (90 Base) MCG/ACT inhaler, INHALE 1 TO 2 PUFFS BY MOUTH EVERY 4 TO 6 HOURS AS NEEDED FOR COUGH AND FOR SHORTNESS OF BREATH, Disp: 9 g, Rfl: 3    apixaban (Eliquis) 5 MG tablet tablet, Take 1 tablet by mouth Every 12 (Twelve) Hours. PLEASE CALL OFFICE, Disp: 60 tablet, Rfl: 12    atenolol (TENORMIN) 25 MG tablet, Take 1 tablet by mouth Daily. (Patient taking differently: Take 1 tablet by mouth Daily. As needed), Disp: 90 tablet, Rfl: 3    atorvastatin (LIPITOR) 80 MG tablet, Take 1 tablet by mouth once daily, Disp: 90 tablet, Rfl: 0    Blood Glucose Monitoring Suppl w/Device kit, 1 Device Daily. DX E11.9, Disp: 1 each, Rfl: 0    carboxymethylcellulose (REFRESH PLUS) 0.5 % solution, Administer  to both eyes 3 (Three) Times a Day As Needed for Dry Eyes., Disp: , Rfl:     ciprofloxacin (CIPRO) 500 MG tablet, Take 1 tablet by mouth Every 12 (Twelve) Hours., Disp: , Rfl:     clopidogrel (PLAVIX) 75 MG tablet, Take 1 tablet by mouth Daily., Disp: , Rfl:     empagliflozin (JARDIANCE) 25 MG tablet tablet, Take 1 tablet by mouth Daily., Disp: 90 tablet, Rfl: 1    fluticasone (FLONASE) 50 MCG/ACT nasal spray, 1-2 sprays in each nostril once daily for allergies, Disp: 16 g, Rfl: 5    glucose blood test strip, 1 each by Other route 4 (Four) Times a Day Before Meals & at Bedtime. Accu-Chek, Disp: 400 each, Rfl: 5    Insulin Glargine (LANTUS SOLOSTAR) 100 UNIT/ML injection pen, Inject 15 Units under the skin into the appropriate area as directed Every Night., Disp: 15 mL, Rfl: 12    Insulin Lispro, 1 Unit Dial, (HumaLOG KwikPen) 100 UNIT/ML solution pen-injector, Inject 3 Units under the skin into the appropriate area as directed 3 (Three) Times a Day With Meals., Disp: 15 mL, Rfl: 1    Insulin Pen Needle (Pen  "Needles) 32G X 4 MM misc, Use 1 each 4 (Four) Times a Day Before Meals & at Bedtime., Disp: 400 each, Rfl: 5    isosorbide mononitrate (IMDUR) 30 MG 24 hr tablet, Take 1 tablet by mouth Daily., Disp: , Rfl:     Lancets misc, Use 1 each 4 (Four) Times a Day Before Meals & at Bedtime. Accu-Chek, Disp: 400 each, Rfl: 5    loperamide (IMODIUM A-D) 2 MG tablet, Take 1 tablet by mouth 4 (Four) Times a Day As Needed for Diarrhea., Disp: 60 tablet, Rfl: 1    loratadine (CLARITIN) 10 MG tablet, Take 1 tablet by mouth Daily., Disp: 90 tablet, Rfl: 3    montelukast (SINGULAIR) 10 MG tablet, Take 1 tablet by mouth Every Night., Disp: 90 tablet, Rfl: 3    nystatin (MYCOSTATIN) 146446 UNIT/GM cream, Apply 1 application  topically to the appropriate area as directed 2 (Two) Times a Day. (Patient taking differently: Apply 1 Application topically to the appropriate area as directed As Needed.), Disp: 60 g, Rfl: 1    pantoprazole (PROTONIX) 40 MG EC tablet, Take 1 tablet by mouth Daily., Disp: 30 tablet, Rfl: 3    PARoxetine (PAXIL) 40 MG tablet, Take 1 tablet by mouth once daily, Disp: 90 tablet, Rfl: 0    valACYclovir (VALTREX) 500 MG tablet, Take 1 tablet by mouth 2 (Two) Times a Day., Disp: , Rfl:     Wheat Dextrin (Benefiber) powder, 2 tsp by mouth three times a day, Disp: 730 g, Rfl: 5    colestipol (COLESTID) 1 g tablet, Take 1 tablet by mouth 2 (Two) Times a Day. Take other medications either more than one hour before this medication of 4 hours after this medication., Disp: 60 tablet, Rfl: 2    Vital Signs:      Temp 97.1 °F (36.2 °C) (Temporal)   Resp 18   Ht 165.1 cm (65\")   Wt 77.9 kg (171 lb 12.8 oz)   SpO2 93%   BMI 28.59 kg/m²       Vitals:    07/18/25 1528   Resp: 18   Temp: 97.1 °F (36.2 °C)   TempSrc: Temporal   SpO2: 93%   Weight: 77.9 kg (171 lb 12.8 oz)   Height: 165.1 cm (65\")        Vitals:    07/18/25 1528   Resp: 18   Temp: 97.1 °F (36.2 °C)   TempSrc: Temporal   SpO2: 93%   Weight: 77.9 kg (171 lb 12.8 " "oz)   Height: 165.1 cm (65\")        Physical Exam  Vitals reviewed.   Constitutional:       General: She is not in acute distress.     Appearance: Normal appearance. She is overweight.      Comments: Using cane   HENT:      Head: Normocephalic and atraumatic.      Right Ear: Ear canal and external ear normal. A middle ear effusion is present. Tympanic membrane is retracted.      Left Ear: Ear canal and external ear normal. A middle ear effusion is present. Tympanic membrane is retracted.      Mouth/Throat:      Mouth: Mucous membranes are moist.      Pharynx: Oropharynx is clear. No oropharyngeal exudate.   Eyes:      General: No scleral icterus.     Conjunctiva/sclera: Conjunctivae normal.   Neck:      Thyroid: No thyromegaly.   Cardiovascular:      Rate and Rhythm: Normal rate and regular rhythm.      Heart sounds: Normal heart sounds.   Pulmonary:      Effort: Pulmonary effort is normal. No respiratory distress.      Breath sounds: Normal breath sounds.   Musculoskeletal:      Left shoulder: Normal.      Cervical back: Neck supple.      Right lower leg: No edema.      Left lower leg: No edema.   Lymphadenopathy:      Cervical: No cervical adenopathy.   Skin:     General: Skin is warm and dry.   Neurological:      Mental Status: She is alert and oriented to person, place, and time.   Psychiatric:         Mood and Affect: Mood normal.         Behavior: Behavior normal.        Result Review :                 Assessment and Plan    Diagnoses and all orders for this visit:    1. Dizzy (Primary)  -     CT Angiogram Neck; Future  -     CT Angiogram Head; Future    2. Ataxia  -     CT Angiogram Neck; Future  -     CT Angiogram Head; Future    3. Unsteady gait  -     CT Angiogram Neck; Future  -     CT Angiogram Head; Future    4. Chronic diarrhea  -     colestipol (COLESTID) 1 g tablet; Take 1 tablet by mouth 2 (Two) Times a Day. Take other medications either more than one hour before this medication of 4 hours after " this medication.  Dispense: 60 tablet; Refill: 2    5. Type 2 diabetes mellitus with hyperglycemia, with long-term current use of insulin  Overview:  Hgba1c 14 % on 8/23/2023  Hgba1c 13.1 % on 12/15/2023  Hgba1c 15 % on 3/15/2024  Hgba1c 12.6 % on 6/28/2024  Hgba1c 15 % on 11/21/2024  Hgba1c 13.3 % 03/04/2025      6. Essential hypertension    Other orders  -     Discontinue: colesevelam (Welchol) 625 MG tablet; 2 tablets by mouth 2 (Two) Times a Day With Meals.  Dispense: 120 tablet; Refill: 5       Dizziness is ongoing.  Follow up after imaging studies    This medical care serves as the continuing focal point of all needed health care services.        Follow Up   No follow-ups on file.  Patient was given instructions and counseling regarding her condition or for health maintenance advice. Please see specific information pulled into the AVS if appropriate.    There are no Patient Instructions on file for this visit.

## 2025-07-24 ENCOUNTER — TELEPHONE (OUTPATIENT)
Dept: FAMILY MEDICINE CLINIC | Facility: CLINIC | Age: 60
End: 2025-07-24
Payer: MEDICARE

## 2025-08-18 ENCOUNTER — HOSPITAL ENCOUNTER (OUTPATIENT)
Dept: PAIN MEDICINE | Facility: HOSPITAL | Age: 60
Discharge: HOME | End: 2025-08-18
Payer: MEDICARE

## 2025-08-18 VITALS
TEMPERATURE: 97.1 F | RESPIRATION RATE: 16 BRPM | SYSTOLIC BLOOD PRESSURE: 115 MMHG | DIASTOLIC BLOOD PRESSURE: 74 MMHG | HEIGHT: 65 IN | WEIGHT: 171 LBS | OXYGEN SATURATION: 97 % | HEART RATE: 88 BPM | BODY MASS INDEX: 28.49 KG/M2

## 2025-08-18 DIAGNOSIS — M54.12 CERVICAL RADICULITIS: Primary | ICD-10-CM

## 2025-08-18 DIAGNOSIS — R52 PAIN: ICD-10-CM

## 2025-08-18 PROCEDURE — 25010000002 METHYLPREDNISOLONE PER 40 MG: Performed by: ANESTHESIOLOGY

## 2025-08-18 PROCEDURE — 25510000001 IOPAMIDOL 41 % SOLUTION: Performed by: ANESTHESIOLOGY

## 2025-08-18 PROCEDURE — 77003 FLUOROGUIDE FOR SPINE INJECT: CPT

## 2025-08-18 RX ORDER — IOPAMIDOL 408 MG/ML
3 INJECTION, SOLUTION INTRATHECAL
Status: COMPLETED | OUTPATIENT
Start: 2025-08-18 | End: 2025-08-18

## 2025-08-18 RX ORDER — METHYLPREDNISOLONE ACETATE 40 MG/ML
40 INJECTION, SUSPENSION INTRA-ARTICULAR; INTRALESIONAL; INTRAMUSCULAR; SOFT TISSUE ONCE
Status: COMPLETED | OUTPATIENT
Start: 2025-08-18 | End: 2025-08-18

## 2025-08-18 RX ADMIN — IOPAMIDOL 3 ML: 408 INJECTION, SOLUTION INTRATHECAL at 11:37

## 2025-08-18 RX ADMIN — METHYLPREDNISOLONE ACETATE 40 MG: 40 INJECTION, SUSPENSION INTRA-ARTICULAR; INTRALESIONAL; INTRAMUSCULAR; INTRASYNOVIAL; SOFT TISSUE at 11:37

## 2025-08-19 ENCOUNTER — TELEPHONE (OUTPATIENT)
Dept: PAIN MEDICINE | Facility: HOSPITAL | Age: 60
End: 2025-08-19
Payer: MEDICARE

## 2025-08-19 ENCOUNTER — TRANSCRIBE ORDERS (OUTPATIENT)
Dept: ADMINISTRATIVE | Facility: HOSPITAL | Age: 60
End: 2025-08-19
Payer: MEDICARE

## 2025-08-19 DIAGNOSIS — I70.213 ATHEROSCLEROSIS OF NATIVE ARTERY OF BOTH LOWER EXTREMITIES WITH INTERMITTENT CLAUDICATION: Primary | ICD-10-CM

## 2025-08-21 ENCOUNTER — TELEPHONE (OUTPATIENT)
Dept: FAMILY MEDICINE CLINIC | Facility: CLINIC | Age: 60
End: 2025-08-21
Payer: MEDICARE

## 2025-08-21 DIAGNOSIS — S99.829A TOENAIL TORN AWAY: Primary | ICD-10-CM

## (undated) DEVICE — PK ENDO GI 50

## (undated) DEVICE — TRAP WIDEEYE POLYP

## (undated) DEVICE — SINGLE-USE BIOPSY FORCEPS: Brand: RADIAL JAW 4

## (undated) DEVICE — PAD, GROUNDING, UNIVERSAL, SPLIT, 9': Brand: MEDLINE

## (undated) DEVICE — SNAR POLYP HOTSNARE/BRAIDED OVL/MINI 7F 2.8X10MM 230CM 1P/U